# Patient Record
Sex: FEMALE | Race: WHITE | Employment: FULL TIME | URBAN - NONMETROPOLITAN AREA
[De-identification: names, ages, dates, MRNs, and addresses within clinical notes are randomized per-mention and may not be internally consistent; named-entity substitution may affect disease eponyms.]

---

## 2017-03-27 ENCOUNTER — OFFICE VISIT (OUTPATIENT)
Dept: CARDIOLOGY | Age: 52
End: 2017-03-27

## 2017-03-27 VITALS
HEIGHT: 65 IN | WEIGHT: 186.4 LBS | HEART RATE: 80 BPM | SYSTOLIC BLOOD PRESSURE: 164 MMHG | DIASTOLIC BLOOD PRESSURE: 90 MMHG | BODY MASS INDEX: 31.06 KG/M2

## 2017-03-27 DIAGNOSIS — R06.02 SOB (SHORTNESS OF BREATH) ON EXERTION: ICD-10-CM

## 2017-03-27 DIAGNOSIS — Z82.49 FAMILY HISTORY OF PREMATURE CAD: ICD-10-CM

## 2017-03-27 DIAGNOSIS — R07.9 CHEST PAIN AT REST: Primary | ICD-10-CM

## 2017-03-27 DIAGNOSIS — R94.39 ABNORMAL STRESS ECG: ICD-10-CM

## 2017-03-27 DIAGNOSIS — R00.2 INTERMITTENT PALPITATIONS: ICD-10-CM

## 2017-03-27 PROCEDURE — 99204 OFFICE O/P NEW MOD 45 MIN: CPT | Performed by: INTERNAL MEDICINE

## 2017-03-27 RX ORDER — RIBOFLAVIN (VITAMIN B2) 100 MG
500 TABLET ORAL DAILY
COMMUNITY
End: 2022-06-13

## 2017-03-27 RX ORDER — ASPIRIN 81 MG/1
81 TABLET ORAL DAILY
Qty: 30 TABLET | Refills: 3 | COMMUNITY
Start: 2017-03-27

## 2017-04-24 ENCOUNTER — OFFICE VISIT (OUTPATIENT)
Dept: CARDIOLOGY | Age: 52
End: 2017-04-24

## 2017-04-24 VITALS
DIASTOLIC BLOOD PRESSURE: 86 MMHG | HEIGHT: 65 IN | WEIGHT: 185.4 LBS | BODY MASS INDEX: 30.89 KG/M2 | SYSTOLIC BLOOD PRESSURE: 134 MMHG | HEART RATE: 76 BPM

## 2017-04-24 DIAGNOSIS — R94.39 ABNORMAL STRESS ECG: ICD-10-CM

## 2017-04-24 DIAGNOSIS — R06.02 SOB (SHORTNESS OF BREATH) ON EXERTION: ICD-10-CM

## 2017-04-24 DIAGNOSIS — R00.2 INTERMITTENT PALPITATIONS: Primary | ICD-10-CM

## 2017-04-24 PROCEDURE — 99213 OFFICE O/P EST LOW 20 MIN: CPT | Performed by: INTERNAL MEDICINE

## 2017-08-28 ENCOUNTER — OFFICE VISIT (OUTPATIENT)
Dept: CARDIOLOGY CLINIC | Age: 52
End: 2017-08-28
Payer: COMMERCIAL

## 2017-08-28 VITALS
HEART RATE: 60 BPM | HEIGHT: 65 IN | SYSTOLIC BLOOD PRESSURE: 124 MMHG | WEIGHT: 191 LBS | BODY MASS INDEX: 31.82 KG/M2 | DIASTOLIC BLOOD PRESSURE: 62 MMHG

## 2017-08-28 DIAGNOSIS — R00.2 INTERMITTENT PALPITATIONS: Primary | ICD-10-CM

## 2017-08-28 DIAGNOSIS — Z82.49 FAMILY HISTORY OF PREMATURE CAD: ICD-10-CM

## 2017-08-28 DIAGNOSIS — R94.39 ABNORMAL STRESS ECG: ICD-10-CM

## 2017-08-28 DIAGNOSIS — R06.02 SOB (SHORTNESS OF BREATH) ON EXERTION: ICD-10-CM

## 2017-08-28 PROBLEM — R07.9 CHEST PAIN AT REST: Status: RESOLVED | Noted: 2017-03-27 | Resolved: 2017-08-28

## 2017-08-28 PROCEDURE — 99213 OFFICE O/P EST LOW 20 MIN: CPT | Performed by: INTERNAL MEDICINE

## 2017-11-10 ENCOUNTER — HOSPITAL ENCOUNTER (OUTPATIENT)
Dept: WOMENS IMAGING | Age: 52
Discharge: HOME | End: 2017-11-10
Payer: COMMERCIAL

## 2017-11-10 DIAGNOSIS — Z13.9 VISIT FOR SCREENING: ICD-10-CM

## 2017-11-10 PROCEDURE — 77063 BREAST TOMOSYNTHESIS BI: CPT

## 2018-02-03 ENCOUNTER — HOSPITAL ENCOUNTER (OUTPATIENT)
Age: 53
Discharge: HOME | End: 2018-02-03
Payer: COMMERCIAL

## 2018-02-03 LAB
ALBUMIN SERPL-MCNC: 4.6 G/DL (ref 3.5–5.1)
ALP BLD-CCNC: 144 U/L (ref 38–126)
ALT SERPL-CCNC: 12 U/L (ref 11–66)
AMORPHOUS: ABNORMAL
ANION GAP SERPL CALCULATED.3IONS-SCNC: 10 MEQ/L (ref 8–16)
AST SERPL-CCNC: 15 U/L (ref 5–40)
BACTERIA: ABNORMAL
BASOPHILS # BLD: 0.7 %
BASOPHILS ABSOLUTE: 0 THOU/MM3 (ref 0–0.1)
BILIRUB SERPL-MCNC: 0.4 MG/DL (ref 0.3–1.2)
BILIRUBIN URINE: NEGATIVE
BLOOD, URINE: ABNORMAL
BUN BLDV-MCNC: 15 MG/DL (ref 7–22)
CALCIUM SERPL-MCNC: 10 MG/DL (ref 8.5–10.5)
CASTS: ABNORMAL /LPF
CASTS: ABNORMAL /LPF
CHARACTER, URINE: ABNORMAL
CHLORIDE BLD-SCNC: 101 MEQ/L (ref 98–111)
CHOLESTEROL, TOTAL: 187 MG/DL (ref 100–199)
CO2: 32 MEQ/L (ref 23–33)
COLOR: YELLOW
CREAT SERPL-MCNC: 0.6 MG/DL (ref 0.4–1.2)
CREATININE URINE: 249.3 MG/DL
CRYSTALS: ABNORMAL
EOSINOPHIL # BLD: 2.1 %
EOSINOPHILS ABSOLUTE: 0.1 THOU/MM3 (ref 0–0.4)
EPITHELIAL CELLS, UA: ABNORMAL /HPF
GFR SERPL CREATININE-BSD FRML MDRD: > 90 ML/MIN/1.73M2
GLUCOSE BLD-MCNC: 96 MG/DL (ref 70–108)
GLUCOSE, URINE: NEGATIVE MG/DL
HCT VFR BLD CALC: 42.5 % (ref 37–47)
HDLC SERPL-MCNC: 95 MG/DL
HEMOGLOBIN: 14.4 GM/DL (ref 12–16)
KETONES, URINE: NEGATIVE
LDL CHOLESTEROL CALCULATED: 80 MG/DL
LEUKOCYTE EST, POC: NEGATIVE
LYMPHOCYTES # BLD: 30.2 %
LYMPHOCYTES ABSOLUTE: 1.7 THOU/MM3 (ref 1–4.8)
MAGNESIUM: 1.9 MG/DL (ref 1.6–2.4)
MCH RBC QN AUTO: 30.2 PG (ref 27–31)
MCHC RBC AUTO-ENTMCNC: 33.9 GM/DL (ref 33–37)
MCV RBC AUTO: 89 FL (ref 81–99)
MISCELLANEOUS LAB TEST RESULT: ABNORMAL
MONOCYTES # BLD: 11.2 %
MONOCYTES ABSOLUTE: 0.6 THOU/MM3 (ref 0.4–1.3)
MUCUS: ABNORMAL
NITRITE, URINE: NEGATIVE
NUCLEATED RED BLOOD CELLS: 0 /100 WBC
PDW BLD-RTO: 13.1 % (ref 11.5–14.5)
PH UA: 5
PLATELET # BLD: 194 THOU/MM3 (ref 130–400)
PMV BLD AUTO: 9.9 FL (ref 7.4–10.4)
POTASSIUM SERPL-SCNC: 4.1 MEQ/L (ref 3.5–5.2)
PROT/CREAT RATIO, UR: 0.09
PROTEIN UA: NEGATIVE MG/DL
PROTEIN, URINE: 22.3 MG/DL
RBC # BLD: 4.78 MILL/MM3 (ref 4.2–5.4)
RBC URINE: ABNORMAL /HPF
RENAL EPITHELIAL, UA: ABNORMAL
SEG NEUTROPHILS: 55.8 %
SEGMENTED NEUTROPHILS ABSOLUTE COUNT: 3.2 THOU/MM3 (ref 1.8–7.7)
SODIUM BLD-SCNC: 143 MEQ/L (ref 135–145)
SPECIFIC GRAVITY UA: 1.02 (ref 1–1.03)
TOTAL PROTEIN: 7.5 G/DL (ref 6.1–8)
TRIGL SERPL-MCNC: 60 MG/DL (ref 0–199)
UROBILINOGEN, URINE: 0.2 EU/DL
VITAMIN D 25-HYDROXY: 39 NG/ML (ref 30–100)
WBC # BLD: 5.7 THOU/MM3 (ref 4.8–10.8)
WBC UA: ABNORMAL /HPF
YEAST: ABNORMAL

## 2018-02-03 PROCEDURE — 36415 COLL VENOUS BLD VENIPUNCTURE: CPT

## 2018-02-03 PROCEDURE — 85025 COMPLETE CBC W/AUTO DIFF WBC: CPT

## 2018-02-03 PROCEDURE — 80061 LIPID PANEL: CPT

## 2018-02-03 PROCEDURE — 84156 ASSAY OF PROTEIN URINE: CPT

## 2018-02-03 PROCEDURE — 82570 ASSAY OF URINE CREATININE: CPT

## 2018-02-03 PROCEDURE — 83735 ASSAY OF MAGNESIUM: CPT

## 2018-02-03 PROCEDURE — 81001 URINALYSIS AUTO W/SCOPE: CPT

## 2018-02-03 PROCEDURE — 80053 COMPREHEN METABOLIC PANEL: CPT

## 2018-02-03 PROCEDURE — 82306 VITAMIN D 25 HYDROXY: CPT

## 2018-03-05 ENCOUNTER — OFFICE VISIT (OUTPATIENT)
Dept: CARDIOLOGY CLINIC | Age: 53
End: 2018-03-05
Payer: COMMERCIAL

## 2018-03-05 VITALS
HEART RATE: 58 BPM | DIASTOLIC BLOOD PRESSURE: 78 MMHG | SYSTOLIC BLOOD PRESSURE: 126 MMHG | BODY MASS INDEX: 34.37 KG/M2 | WEIGHT: 206.31 LBS | HEIGHT: 65 IN

## 2018-03-05 DIAGNOSIS — R06.02 SOB (SHORTNESS OF BREATH) ON EXERTION: ICD-10-CM

## 2018-03-05 DIAGNOSIS — R00.2 INTERMITTENT PALPITATIONS: Primary | ICD-10-CM

## 2018-03-05 DIAGNOSIS — R94.39 ABNORMAL STRESS ECG: ICD-10-CM

## 2018-03-05 PROCEDURE — 99214 OFFICE O/P EST MOD 30 MIN: CPT | Performed by: INTERNAL MEDICINE

## 2018-03-05 PROCEDURE — 93000 ELECTROCARDIOGRAM COMPLETE: CPT | Performed by: INTERNAL MEDICINE

## 2018-03-05 RX ORDER — FEXOFENADINE HCL 180 MG/1
180 TABLET ORAL DAILY
COMMUNITY
End: 2018-08-17 | Stop reason: SDUPTHER

## 2018-03-05 RX ORDER — PREDNISONE 10 MG/1
10 TABLET ORAL DAILY
COMMUNITY
End: 2018-08-17

## 2018-03-05 NOTE — PROGRESS NOTES
part of the lexiscan stress test is equivocal/Borderline for   ischemia with 0.6 mm ST depression in the inferior and lateral leads(   II,III, AvF, V5 and V6).  Calculated gated LVEF 68 %.   The T.I.D. ratio was 1.2   There is moderate attenuation artifact noted in the inferior wall seems to   be related to bowel artifact.   There was no evidence of definite reversible tracer uptake.   The nuclear images is not suggestive for myocardial ischemia.      Recommendation   Clinical correlation is recommended in view of borderline EKG change.      Signatures      ----------------------------------------------------------------   Electronically signed by Stacey Driver MD (Interpreting   Cardiologist) on 03/08/2017 at 20:20    Echo EF WNL      CONCLUSION: This is a normal sinus rhythm, average heart rate 77 beats per  minute, ranging from  beats per minute. No significant pause of more  than 1.2 seconds noted. Rare ventricular ectopic beats, total of 9, all  isolated. Rare supraventricular ectopic beats, a total of 80, isolated and  supraventricular couplet.     Occasional supraventricular ectopic run, in the triplet and quadruplet pattern  and there are a few triplet and few, 1 quadruplet, the fastest and the longest  composed of 4 beats, rate 132 beats per minute.     There is a correlation most of the time with a palpitation. The patient  stating and is the supraventricular ectopic run particularly with triplets, on  2 times, the patient was complaining of palpitation and the patient was  triplets of supraventricular ectopic beats.     So need further recommendation basically consider beta blocker for suppression  of supraventricular ectopic beats.         Kirit Lucio M.D.        D: 04/04/2017    EKG 3/5/18  NSR, no acute abn     Assessment    1. Intermittent palpitations  EKG 12 Lead   2. SOB (shortness of breath) on exertion  EKG 12 Lead   3.  Boderline Abnormal stress ECG-st depression of 0.6         Plan

## 2018-08-17 ENCOUNTER — OFFICE VISIT (OUTPATIENT)
Dept: FAMILY MEDICINE CLINIC | Age: 53
End: 2018-08-17
Payer: COMMERCIAL

## 2018-08-17 VITALS
DIASTOLIC BLOOD PRESSURE: 74 MMHG | WEIGHT: 201.2 LBS | SYSTOLIC BLOOD PRESSURE: 118 MMHG | OXYGEN SATURATION: 98 % | RESPIRATION RATE: 12 BRPM | BODY MASS INDEX: 33.52 KG/M2 | HEART RATE: 63 BPM | HEIGHT: 65 IN

## 2018-08-17 DIAGNOSIS — E78.1 PURE HYPERGLYCERIDEMIA: ICD-10-CM

## 2018-08-17 DIAGNOSIS — Z78.0 OSTEOPENIA AFTER MENOPAUSE: ICD-10-CM

## 2018-08-17 DIAGNOSIS — I10 ESSENTIAL HYPERTENSION: Primary | ICD-10-CM

## 2018-08-17 DIAGNOSIS — J30.2 SEASONAL ALLERGIES: ICD-10-CM

## 2018-08-17 DIAGNOSIS — M85.80 OSTEOPENIA AFTER MENOPAUSE: ICD-10-CM

## 2018-08-17 DIAGNOSIS — E55.9 VITAMIN D DEFICIENCY: ICD-10-CM

## 2018-08-17 DIAGNOSIS — E66.09 CLASS 1 OBESITY DUE TO EXCESS CALORIES WITH SERIOUS COMORBIDITY AND BODY MASS INDEX (BMI) OF 33.0 TO 33.9 IN ADULT: ICD-10-CM

## 2018-08-17 PROBLEM — R06.02 SOB (SHORTNESS OF BREATH) ON EXERTION: Status: RESOLVED | Noted: 2017-03-27 | Resolved: 2018-08-17

## 2018-08-17 PROBLEM — E66.811 CLASS 1 OBESITY DUE TO EXCESS CALORIES WITH SERIOUS COMORBIDITY AND BODY MASS INDEX (BMI) OF 33.0 TO 33.9 IN ADULT: Status: ACTIVE | Noted: 2018-08-17

## 2018-08-17 PROCEDURE — 99214 OFFICE O/P EST MOD 30 MIN: CPT | Performed by: NURSE PRACTITIONER

## 2018-08-17 RX ORDER — BUSPIRONE HYDROCHLORIDE 10 MG/1
TABLET ORAL 2 TIMES DAILY
COMMUNITY
Start: 2018-08-15 | End: 2019-02-19 | Stop reason: SDUPTHER

## 2018-08-17 RX ORDER — FEXOFENADINE HCL 180 MG/1
180 TABLET ORAL DAILY
Qty: 90 TABLET | Refills: 3 | Status: SHIPPED | OUTPATIENT
Start: 2018-08-17 | End: 2019-02-19 | Stop reason: SDUPTHER

## 2018-08-17 ASSESSMENT — ENCOUNTER SYMPTOMS
SORE THROAT: 0
EYE DISCHARGE: 0
PHOTOPHOBIA: 0
RHINORRHEA: 0
SHORTNESS OF BREATH: 0
TROUBLE SWALLOWING: 0
COUGH: 1
EYE ITCHING: 0
DIARRHEA: 0
ABDOMINAL DISTENTION: 0
BLOOD IN STOOL: 0
COLOR CHANGE: 0
NAUSEA: 0
EYE PAIN: 0
EYE REDNESS: 0
WHEEZING: 0
ABDOMINAL PAIN: 0
CONSTIPATION: 0
SINUS PAIN: 0
BACK PAIN: 0
SINUS PRESSURE: 0
CHEST TIGHTNESS: 0
VOMITING: 0

## 2018-08-17 ASSESSMENT — PATIENT HEALTH QUESTIONNAIRE - PHQ9
1. LITTLE INTEREST OR PLEASURE IN DOING THINGS: 0
SUM OF ALL RESPONSES TO PHQ QUESTIONS 1-9: 0
SUM OF ALL RESPONSES TO PHQ9 QUESTIONS 1 & 2: 0
SUM OF ALL RESPONSES TO PHQ QUESTIONS 1-9: 0
2. FEELING DOWN, DEPRESSED OR HOPELESS: 0

## 2018-08-17 NOTE — PROGRESS NOTES
[facility]  [address] [place] [address]  Dept: [phone]  Dept Fax: [phone]: [phone]     Visit Date:  8/17/2018    Patient:  Daniel Adame  YOB: 1965    HPI:     Chief Complaint   Patient presents with    Annual Exam       Feeling well. Concerned she has a cracked rib d/t pain under the left breast.  Has used advil if pain is bad. Helps. Hurts to cough and to sit up from lying or to bend over. Medications    Current Outpatient Prescriptions:     busPIRone (BUSPAR) 10 MG tablet, 2 times daily , Disp: , Rfl:     fexofenadine (ALLEGRA) 180 MG tablet, Take 1 tablet by mouth daily, Disp: 90 tablet, Rfl: 3    metoprolol tartrate (LOPRESSOR) 25 MG tablet, TAKE ONE TABLET BY MOUTH TWICE DAILY, Disp: 180 tablet, Rfl: 3    Ascorbic Acid (VITAMIN C) 100 MG tablet, Take 1,000 mg by mouth daily, Disp: , Rfl:     aspirin EC 81 MG EC tablet, Take 1 tablet by mouth daily, Disp: 30 tablet, Rfl: 3    ferrous sulfate 325 (65 FE) MG tablet, Take 325 mg by mouth daily (with breakfast), Disp: , Rfl:     vitamin D 1000 UNITS CAPS, Take 1 capsule by mouth daily, Disp: , Rfl:     Omega-3 Fatty Acids (FISH OIL) 1000 MG CAPS, Take 1,000 mg by mouth 2 times daily, Disp: , Rfl:     losartan (COZAAR) 50 MG tablet, Take 50 mg by mouth daily. Indications: High Blood Pressure, Disp: , Rfl:     calcium-vitamin D (OSCAL-500) 500-200 MG-UNIT per tablet, Take 1 tablet by mouth 2 times daily. supplement, Disp: , Rfl:     The patient is allergic to morphine and codeine. Past Medical History  Shruthi Rojas  has a past medical history of Anxiety; Arthritis; Hx of blood clots; Hypertension; and Pure hyperglyceridemia. Past Surgical History  The patient  has a past surgical history that includes Hysterectomy, total abdominal (3/31/2016).     Family History  This patient's family history includes Diabetes in her maternal grandmother; Heart

## 2018-12-06 ENCOUNTER — HOSPITAL ENCOUNTER (OUTPATIENT)
Dept: WOMENS IMAGING | Age: 53
Discharge: HOME | End: 2018-12-06
Payer: COMMERCIAL

## 2018-12-06 DIAGNOSIS — Z12.31 VISIT FOR SCREENING MAMMOGRAM: ICD-10-CM

## 2018-12-06 PROCEDURE — 77067 SCR MAMMO BI INCL CAD: CPT

## 2019-01-21 ENCOUNTER — TELEPHONE (OUTPATIENT)
Dept: ADMINISTRATIVE | Age: 54
End: 2019-01-21

## 2019-02-06 ENCOUNTER — TELEPHONE (OUTPATIENT)
Dept: FAMILY MEDICINE CLINIC | Age: 54
End: 2019-02-06

## 2019-02-06 DIAGNOSIS — I10 ESSENTIAL HYPERTENSION: Primary | ICD-10-CM

## 2019-02-06 RX ORDER — IRBESARTAN 75 MG/1
75 TABLET ORAL DAILY
Qty: 30 TABLET | Refills: 5 | Status: SHIPPED | OUTPATIENT
Start: 2019-02-06 | End: 2019-02-07 | Stop reason: CLARIF

## 2019-02-07 DIAGNOSIS — I10 ESSENTIAL HYPERTENSION: Primary | ICD-10-CM

## 2019-02-07 RX ORDER — OLMESARTAN MEDOXOMIL 20 MG/1
20 TABLET ORAL DAILY
Qty: 90 TABLET | Refills: 3 | Status: SHIPPED | OUTPATIENT
Start: 2019-02-07 | End: 2019-05-20 | Stop reason: CLARIF

## 2019-02-09 ENCOUNTER — HOSPITAL ENCOUNTER (OUTPATIENT)
Age: 54
Discharge: HOME | End: 2019-02-09
Payer: COMMERCIAL

## 2019-02-09 DIAGNOSIS — I10 ESSENTIAL HYPERTENSION: ICD-10-CM

## 2019-02-09 DIAGNOSIS — E66.09 CLASS 1 OBESITY DUE TO EXCESS CALORIES WITH SERIOUS COMORBIDITY AND BODY MASS INDEX (BMI) OF 33.0 TO 33.9 IN ADULT: ICD-10-CM

## 2019-02-09 DIAGNOSIS — E78.1 PURE HYPERGLYCERIDEMIA: ICD-10-CM

## 2019-02-09 DIAGNOSIS — M85.80 OSTEOPENIA AFTER MENOPAUSE: ICD-10-CM

## 2019-02-09 DIAGNOSIS — Z78.0 OSTEOPENIA AFTER MENOPAUSE: ICD-10-CM

## 2019-02-09 DIAGNOSIS — E55.9 VITAMIN D DEFICIENCY: ICD-10-CM

## 2019-02-09 LAB
ALBUMIN SERPL-MCNC: 4.2 G/DL (ref 3.5–5.1)
ALP BLD-CCNC: 138 U/L (ref 38–126)
ALT SERPL-CCNC: 13 U/L (ref 11–66)
ANION GAP SERPL CALCULATED.3IONS-SCNC: 13 MEQ/L (ref 8–16)
AST SERPL-CCNC: 15 U/L (ref 5–40)
AVERAGE GLUCOSE: 99 MG/DL (ref 70–126)
BILIRUB SERPL-MCNC: 0.4 MG/DL (ref 0.3–1.2)
BUN BLDV-MCNC: 18 MG/DL (ref 7–22)
CALCIUM SERPL-MCNC: 10 MG/DL (ref 8.5–10.5)
CHLORIDE BLD-SCNC: 103 MEQ/L (ref 98–111)
CHOLESTEROL, TOTAL: 167 MG/DL (ref 100–199)
CO2: 27 MEQ/L (ref 23–33)
CREAT SERPL-MCNC: 0.6 MG/DL (ref 0.4–1.2)
ERYTHROCYTE [DISTWIDTH] IN BLOOD BY AUTOMATED COUNT: 12.9 % (ref 11.5–14.5)
ERYTHROCYTE [DISTWIDTH] IN BLOOD BY AUTOMATED COUNT: 42.4 FL (ref 35–45)
GFR SERPL CREATININE-BSD FRML MDRD: > 90 ML/MIN/1.73M2
GLUCOSE BLD-MCNC: 93 MG/DL (ref 70–108)
HBA1C MFR BLD: 5.3 % (ref 4.4–6.4)
HCT VFR BLD CALC: 42.3 % (ref 37–47)
HDLC SERPL-MCNC: 75 MG/DL
HEMOGLOBIN: 13.8 GM/DL (ref 12–16)
LDL CHOLESTEROL CALCULATED: 85 MG/DL
MAGNESIUM: 1.9 MG/DL (ref 1.6–2.4)
MCH RBC QN AUTO: 29.7 PG (ref 26–33)
MCHC RBC AUTO-ENTMCNC: 32.6 GM/DL (ref 32.2–35.5)
MCV RBC AUTO: 91 FL (ref 81–99)
PLATELET # BLD: 222 THOU/MM3 (ref 130–400)
PMV BLD AUTO: 11.2 FL (ref 9.4–12.4)
POTASSIUM SERPL-SCNC: 4.4 MEQ/L (ref 3.5–5.2)
RBC # BLD: 4.65 MILL/MM3 (ref 4.2–5.4)
SODIUM BLD-SCNC: 143 MEQ/L (ref 135–145)
TOTAL PROTEIN: 7.2 G/DL (ref 6.1–8)
TRIGL SERPL-MCNC: 37 MG/DL (ref 0–199)
VITAMIN D 25-HYDROXY: 33 NG/ML (ref 30–100)
WBC # BLD: 5.3 THOU/MM3 (ref 4.8–10.8)

## 2019-02-09 PROCEDURE — 82306 VITAMIN D 25 HYDROXY: CPT

## 2019-02-09 PROCEDURE — 80061 LIPID PANEL: CPT

## 2019-02-09 PROCEDURE — 83735 ASSAY OF MAGNESIUM: CPT

## 2019-02-09 PROCEDURE — 36415 COLL VENOUS BLD VENIPUNCTURE: CPT

## 2019-02-09 PROCEDURE — 80053 COMPREHEN METABOLIC PANEL: CPT

## 2019-02-09 PROCEDURE — 85027 COMPLETE CBC AUTOMATED: CPT

## 2019-02-09 PROCEDURE — 83036 HEMOGLOBIN GLYCOSYLATED A1C: CPT

## 2019-02-19 ENCOUNTER — OFFICE VISIT (OUTPATIENT)
Dept: FAMILY MEDICINE CLINIC | Age: 54
End: 2019-02-19
Payer: COMMERCIAL

## 2019-02-19 VITALS
HEIGHT: 65 IN | DIASTOLIC BLOOD PRESSURE: 88 MMHG | RESPIRATION RATE: 12 BRPM | BODY MASS INDEX: 34.19 KG/M2 | OXYGEN SATURATION: 98 % | SYSTOLIC BLOOD PRESSURE: 122 MMHG | WEIGHT: 205.2 LBS | HEART RATE: 61 BPM

## 2019-02-19 DIAGNOSIS — F41.9 ANXIETY: ICD-10-CM

## 2019-02-19 DIAGNOSIS — J30.2 SEASONAL ALLERGIES: ICD-10-CM

## 2019-02-19 DIAGNOSIS — E55.9 VITAMIN D DEFICIENCY: ICD-10-CM

## 2019-02-19 DIAGNOSIS — I10 ESSENTIAL HYPERTENSION: Primary | ICD-10-CM

## 2019-02-19 DIAGNOSIS — E78.1 PURE HYPERGLYCERIDEMIA: ICD-10-CM

## 2019-02-19 PROCEDURE — 99213 OFFICE O/P EST LOW 20 MIN: CPT | Performed by: NURSE PRACTITIONER

## 2019-02-19 RX ORDER — BUSPIRONE HYDROCHLORIDE 10 MG/1
10 TABLET ORAL 2 TIMES DAILY
Qty: 180 TABLET | Refills: 3 | Status: SHIPPED | OUTPATIENT
Start: 2019-02-19 | End: 2020-02-14

## 2019-02-19 RX ORDER — FEXOFENADINE HCL 180 MG/1
180 TABLET ORAL DAILY
Qty: 90 TABLET | Refills: 3 | Status: ON HOLD | OUTPATIENT
Start: 2019-02-19 | End: 2019-07-24 | Stop reason: SDUPTHER

## 2019-02-19 ASSESSMENT — ENCOUNTER SYMPTOMS
ABDOMINAL DISTENTION: 0
CONSTIPATION: 0
BACK PAIN: 0
RHINORRHEA: 1
SINUS PAIN: 0
EYE PAIN: 0
EYE DISCHARGE: 0
VOMITING: 0
SHORTNESS OF BREATH: 0
NAUSEA: 0
DIARRHEA: 0
TROUBLE SWALLOWING: 0
EYE REDNESS: 0
EYE ITCHING: 0
SORE THROAT: 0
WHEEZING: 0
COUGH: 0
ABDOMINAL PAIN: 0
PHOTOPHOBIA: 0
CHEST TIGHTNESS: 0
BLOOD IN STOOL: 0
COLOR CHANGE: 0
SINUS PRESSURE: 0

## 2019-02-19 ASSESSMENT — PATIENT HEALTH QUESTIONNAIRE - PHQ9
1. LITTLE INTEREST OR PLEASURE IN DOING THINGS: 0
2. FEELING DOWN, DEPRESSED OR HOPELESS: 0
SUM OF ALL RESPONSES TO PHQ9 QUESTIONS 1 & 2: 0
SUM OF ALL RESPONSES TO PHQ QUESTIONS 1-9: 0
SUM OF ALL RESPONSES TO PHQ QUESTIONS 1-9: 0

## 2019-03-11 ENCOUNTER — OFFICE VISIT (OUTPATIENT)
Dept: CARDIOLOGY CLINIC | Age: 54
End: 2019-03-11
Payer: COMMERCIAL

## 2019-03-11 VITALS
SYSTOLIC BLOOD PRESSURE: 106 MMHG | HEIGHT: 65 IN | BODY MASS INDEX: 34.41 KG/M2 | WEIGHT: 206.5 LBS | DIASTOLIC BLOOD PRESSURE: 65 MMHG | HEART RATE: 63 BPM

## 2019-03-11 DIAGNOSIS — I10 ESSENTIAL HYPERTENSION: ICD-10-CM

## 2019-03-11 DIAGNOSIS — R00.2 INTERMITTENT PALPITATIONS: Primary | ICD-10-CM

## 2019-03-11 DIAGNOSIS — R94.39 ABNORMAL STRESS ECG: ICD-10-CM

## 2019-03-11 PROCEDURE — 99214 OFFICE O/P EST MOD 30 MIN: CPT | Performed by: INTERNAL MEDICINE

## 2019-03-11 PROCEDURE — 93000 ELECTROCARDIOGRAM COMPLETE: CPT | Performed by: INTERNAL MEDICINE

## 2019-05-20 ENCOUNTER — TELEPHONE (OUTPATIENT)
Dept: FAMILY MEDICINE CLINIC | Age: 54
End: 2019-05-20

## 2019-05-20 DIAGNOSIS — I10 ESSENTIAL HYPERTENSION: Primary | ICD-10-CM

## 2019-05-20 RX ORDER — LOSARTAN POTASSIUM 50 MG/1
50 TABLET ORAL DAILY
Qty: 30 TABLET | Refills: 3 | Status: SHIPPED | OUTPATIENT
Start: 2019-05-20 | End: 2019-09-18 | Stop reason: SDUPTHER

## 2019-05-20 NOTE — TELEPHONE ENCOUNTER
[facility] is out of olmesartan and it is on backorder, they would like something else sent in to replace this

## 2019-05-20 NOTE — TELEPHONE ENCOUNTER
Patient states she was on this but it was changed because it was recalled. What would you like her to do?

## 2019-07-09 ENCOUNTER — OFFICE VISIT (OUTPATIENT)
Dept: FAMILY MEDICINE CLINIC | Age: 54
End: 2019-07-09
Payer: COMMERCIAL

## 2019-07-09 VITALS
HEART RATE: 64 BPM | RESPIRATION RATE: 14 BRPM | OXYGEN SATURATION: 97 % | DIASTOLIC BLOOD PRESSURE: 76 MMHG | SYSTOLIC BLOOD PRESSURE: 122 MMHG | WEIGHT: 205.2 LBS | BODY MASS INDEX: 34.15 KG/M2

## 2019-07-09 DIAGNOSIS — N30.01 ACUTE CYSTITIS WITH HEMATURIA: Primary | ICD-10-CM

## 2019-07-09 DIAGNOSIS — R30.0 DYSURIA: ICD-10-CM

## 2019-07-09 LAB
BILIRUBIN, POC: NEGATIVE
BLOOD URINE, POC: NORMAL
CLARITY, POC: CLEAR
COLOR, POC: YELLOW
GLUCOSE URINE, POC: NEGATIVE
KETONES, POC: NEGATIVE
LEUKOCYTE EST, POC: NORMAL
NITRITE, POC: NEGATIVE
PH, POC: 5
PROTEIN, POC: NORMAL
SPECIFIC GRAVITY, POC: >=1.03
UROBILINOGEN, POC: NORMAL

## 2019-07-09 PROCEDURE — 81003 URINALYSIS AUTO W/O SCOPE: CPT | Performed by: NURSE PRACTITIONER

## 2019-07-09 PROCEDURE — 99213 OFFICE O/P EST LOW 20 MIN: CPT | Performed by: NURSE PRACTITIONER

## 2019-07-09 RX ORDER — SULFAMETHOXAZOLE AND TRIMETHOPRIM 400; 80 MG/1; MG/1
1 TABLET ORAL 2 TIMES DAILY
Qty: 20 TABLET | Refills: 0 | Status: SHIPPED | OUTPATIENT
Start: 2019-07-09 | End: 2019-07-19

## 2019-07-09 ASSESSMENT — ENCOUNTER SYMPTOMS
TROUBLE SWALLOWING: 0
VOMITING: 0
SHORTNESS OF BREATH: 0
WHEEZING: 0
CONSTIPATION: 0
SINUS PAIN: 0
EYE DISCHARGE: 0
EYE PAIN: 0
PHOTOPHOBIA: 0
SORE THROAT: 0
EYE ITCHING: 0
ABDOMINAL DISTENTION: 0
ABDOMINAL PAIN: 0
COLOR CHANGE: 0
COUGH: 0
BLOOD IN STOOL: 0
DIARRHEA: 0
NAUSEA: 0
CHEST TIGHTNESS: 0
EYE REDNESS: 0
BACK PAIN: 0
RHINORRHEA: 0
SINUS PRESSURE: 0

## 2019-07-11 LAB
ORGANISM: ABNORMAL
URINE CULTURE, ROUTINE: ABNORMAL

## 2019-07-22 ENCOUNTER — APPOINTMENT (OUTPATIENT)
Dept: CT IMAGING | Age: 54
End: 2019-07-22
Payer: COMMERCIAL

## 2019-07-22 ENCOUNTER — HOSPITAL ENCOUNTER (OUTPATIENT)
Age: 54
Setting detail: OBSERVATION
Discharge: HOME | End: 2019-07-24
Attending: FAMILY MEDICINE | Admitting: INTERNAL MEDICINE
Payer: COMMERCIAL

## 2019-07-22 ENCOUNTER — APPOINTMENT (OUTPATIENT)
Dept: GENERAL RADIOLOGY | Age: 54
End: 2019-07-22
Payer: COMMERCIAL

## 2019-07-22 DIAGNOSIS — R07.2 RETROSTERNAL CHEST PAIN: Primary | ICD-10-CM

## 2019-07-22 DIAGNOSIS — E04.9 RETROSTERNAL GOITER: ICD-10-CM

## 2019-07-22 DIAGNOSIS — J30.2 SEASONAL ALLERGIES: ICD-10-CM

## 2019-07-22 DIAGNOSIS — E04.2 MULTIPLE THYROID NODULES: ICD-10-CM

## 2019-07-22 LAB
ANION GAP SERPL CALCULATED.3IONS-SCNC: 13 MEQ/L (ref 8–16)
APTT: 28.6 SECONDS (ref 22–38)
BASOPHILS # BLD: 0.3 %
BASOPHILS ABSOLUTE: 0 THOU/MM3 (ref 0–0.1)
BUN BLDV-MCNC: 15 MG/DL (ref 7–22)
C-REACTIVE PROTEIN: 0.13 MG/DL (ref 0–1)
CALCIUM SERPL-MCNC: 9.7 MG/DL (ref 8.5–10.5)
CHLORIDE BLD-SCNC: 100 MEQ/L (ref 98–111)
CO2: 29 MEQ/L (ref 23–33)
CREAT SERPL-MCNC: 0.9 MG/DL (ref 0.4–1.2)
D-DIMER QUANTITATIVE: 996 NG/ML FEU (ref 0–500)
EOSINOPHIL # BLD: 1.5 %
EOSINOPHILS ABSOLUTE: 0.2 THOU/MM3 (ref 0–0.4)
ERYTHROCYTE [DISTWIDTH] IN BLOOD BY AUTOMATED COUNT: 12.7 % (ref 11.5–14.5)
ERYTHROCYTE [DISTWIDTH] IN BLOOD BY AUTOMATED COUNT: 43.2 FL (ref 35–45)
GFR SERPL CREATININE-BSD FRML MDRD: 65 ML/MIN/1.73M2
GLUCOSE BLD-MCNC: 129 MG/DL (ref 70–108)
HCT VFR BLD CALC: 40.3 % (ref 37–47)
HEMOGLOBIN: 13 GM/DL (ref 12–16)
IMMATURE GRANS (ABS): 0.08 THOU/MM3 (ref 0–0.07)
IMMATURE GRANULOCYTES: 1 %
INR BLD: 0.92 (ref 0.85–1.13)
LYMPHOCYTES # BLD: 19.6 %
LYMPHOCYTES ABSOLUTE: 2.6 THOU/MM3 (ref 1–4.8)
MCH RBC QN AUTO: 29.8 PG (ref 26–33)
MCHC RBC AUTO-ENTMCNC: 32.3 GM/DL (ref 32.2–35.5)
MCV RBC AUTO: 92.4 FL (ref 81–99)
MONOCYTES # BLD: 7.7 %
MONOCYTES ABSOLUTE: 1 THOU/MM3 (ref 0.4–1.3)
NUCLEATED RED BLOOD CELLS: 0 /100 WBC
OSMOLALITY CALCULATION: 285.6 MOSMOL/KG (ref 275–300)
PLATELET # BLD: 225 THOU/MM3 (ref 130–400)
PMV BLD AUTO: 10.6 FL (ref 9.4–12.4)
POTASSIUM REFLEX MAGNESIUM: 3.9 MEQ/L (ref 3.5–5.2)
PRO-BNP: 205.8 PG/ML (ref 0–900)
RBC # BLD: 4.36 MILL/MM3 (ref 4.2–5.4)
SEG NEUTROPHILS: 70.3 %
SEGMENTED NEUTROPHILS ABSOLUTE COUNT: 9.2 THOU/MM3 (ref 1.8–7.7)
SODIUM BLD-SCNC: 142 MEQ/L (ref 135–145)
TROPONIN T: < 0.01 NG/ML
WBC # BLD: 13.1 THOU/MM3 (ref 4.8–10.8)

## 2019-07-22 PROCEDURE — 84484 ASSAY OF TROPONIN QUANT: CPT

## 2019-07-22 PROCEDURE — 99285 EMERGENCY DEPT VISIT HI MDM: CPT

## 2019-07-22 PROCEDURE — 85025 COMPLETE CBC W/AUTO DIFF WBC: CPT

## 2019-07-22 PROCEDURE — 2580000003 HC RX 258: Performed by: FAMILY MEDICINE

## 2019-07-22 PROCEDURE — 71045 X-RAY EXAM CHEST 1 VIEW: CPT

## 2019-07-22 PROCEDURE — 96374 THER/PROPH/DIAG INJ IV PUSH: CPT

## 2019-07-22 PROCEDURE — 96375 TX/PRO/DX INJ NEW DRUG ADDON: CPT

## 2019-07-22 PROCEDURE — 80048 BASIC METABOLIC PNL TOTAL CA: CPT

## 2019-07-22 PROCEDURE — 36415 COLL VENOUS BLD VENIPUNCTURE: CPT

## 2019-07-22 PROCEDURE — 83880 ASSAY OF NATRIURETIC PEPTIDE: CPT

## 2019-07-22 PROCEDURE — 6370000000 HC RX 637 (ALT 250 FOR IP): Performed by: FAMILY MEDICINE

## 2019-07-22 PROCEDURE — 84703 CHORIONIC GONADOTROPIN ASSAY: CPT

## 2019-07-22 PROCEDURE — 74176 CT ABD & PELVIS W/O CONTRAST: CPT

## 2019-07-22 PROCEDURE — 86140 C-REACTIVE PROTEIN: CPT

## 2019-07-22 PROCEDURE — 6360000002 HC RX W HCPCS: Performed by: FAMILY MEDICINE

## 2019-07-22 PROCEDURE — 85730 THROMBOPLASTIN TIME PARTIAL: CPT

## 2019-07-22 PROCEDURE — 93005 ELECTROCARDIOGRAM TRACING: CPT | Performed by: FAMILY MEDICINE

## 2019-07-22 PROCEDURE — 85610 PROTHROMBIN TIME: CPT

## 2019-07-22 PROCEDURE — 85379 FIBRIN DEGRADATION QUANT: CPT

## 2019-07-22 RX ORDER — ONDANSETRON 2 MG/ML
4 INJECTION INTRAMUSCULAR; INTRAVENOUS EVERY 30 MIN PRN
Status: DISCONTINUED | OUTPATIENT
Start: 2019-07-22 | End: 2019-07-23

## 2019-07-22 RX ORDER — 0.9 % SODIUM CHLORIDE 0.9 %
1000 INTRAVENOUS SOLUTION INTRAVENOUS ONCE
Status: COMPLETED | OUTPATIENT
Start: 2019-07-22 | End: 2019-07-22

## 2019-07-22 RX ORDER — FENTANYL CITRATE 50 UG/ML
50 INJECTION, SOLUTION INTRAMUSCULAR; INTRAVENOUS ONCE
Status: COMPLETED | OUTPATIENT
Start: 2019-07-22 | End: 2019-07-22

## 2019-07-22 RX ADMIN — FENTANYL CITRATE 50 MCG: 50 INJECTION, SOLUTION INTRAMUSCULAR; INTRAVENOUS at 22:30

## 2019-07-22 RX ADMIN — SODIUM CHLORIDE 1000 ML: 9 INJECTION, SOLUTION INTRAVENOUS at 22:31

## 2019-07-22 RX ADMIN — Medication: at 22:31

## 2019-07-22 RX ADMIN — ONDANSETRON 4 MG: 2 INJECTION INTRAMUSCULAR; INTRAVENOUS at 22:35

## 2019-07-22 ASSESSMENT — ENCOUNTER SYMPTOMS
VOMITING: 0
COUGH: 0
WHEEZING: 0
SORE THROAT: 0
DIARRHEA: 0
SHORTNESS OF BREATH: 0
ABDOMINAL PAIN: 1
RHINORRHEA: 0
EYE DISCHARGE: 0
BACK PAIN: 0
EYE PAIN: 0
NAUSEA: 0

## 2019-07-22 ASSESSMENT — PAIN DESCRIPTION - LOCATION: LOCATION: CHEST

## 2019-07-22 ASSESSMENT — PAIN SCALES - GENERAL
PAINLEVEL_OUTOF10: 5
PAINLEVEL_OUTOF10: 7
PAINLEVEL_OUTOF10: 8

## 2019-07-23 ENCOUNTER — APPOINTMENT (OUTPATIENT)
Dept: NON INVASIVE DIAGNOSTICS | Age: 54
End: 2019-07-23
Payer: COMMERCIAL

## 2019-07-23 ENCOUNTER — APPOINTMENT (OUTPATIENT)
Dept: ULTRASOUND IMAGING | Age: 54
End: 2019-07-23
Payer: COMMERCIAL

## 2019-07-23 ENCOUNTER — APPOINTMENT (OUTPATIENT)
Dept: CT IMAGING | Age: 54
End: 2019-07-23
Payer: COMMERCIAL

## 2019-07-23 PROBLEM — R07.9 CHEST PAIN: Status: ACTIVE | Noted: 2019-07-23

## 2019-07-23 LAB
ANION GAP SERPL CALCULATED.3IONS-SCNC: 10 MEQ/L (ref 8–16)
BUN BLDV-MCNC: 13 MG/DL (ref 7–22)
CALCIUM SERPL-MCNC: 9.5 MG/DL (ref 8.5–10.5)
CHLORIDE BLD-SCNC: 104 MEQ/L (ref 98–111)
CO2: 26 MEQ/L (ref 23–33)
CREAT SERPL-MCNC: 0.6 MG/DL (ref 0.4–1.2)
EKG ATRIAL RATE: 52 BPM
EKG P AXIS: 48 DEGREES
EKG P-R INTERVAL: 114 MS
EKG Q-T INTERVAL: 436 MS
EKG QRS DURATION: 98 MS
EKG QTC CALCULATION (BAZETT): 405 MS
EKG R AXIS: 50 DEGREES
EKG T AXIS: 33 DEGREES
EKG VENTRICULAR RATE: 52 BPM
GFR SERPL CREATININE-BSD FRML MDRD: > 90 ML/MIN/1.73M2
GLUCOSE BLD-MCNC: 105 MG/DL (ref 70–108)
OSMOLALITY CALCULATION: 279.9 MOSMOL/KG (ref 275–300)
POTASSIUM REFLEX MAGNESIUM: 4.3 MEQ/L (ref 3.5–5.2)
PREGNANCY, SERUM: NEGATIVE
SODIUM BLD-SCNC: 140 MEQ/L (ref 135–145)
TROPONIN T: < 0.01 NG/ML
TROPONIN T: < 0.01 NG/ML

## 2019-07-23 PROCEDURE — 93017 CV STRESS TEST TRACING ONLY: CPT

## 2019-07-23 PROCEDURE — 6370000000 HC RX 637 (ALT 250 FOR IP): Performed by: INTERNAL MEDICINE

## 2019-07-23 PROCEDURE — G0378 HOSPITAL OBSERVATION PER HR: HCPCS

## 2019-07-23 PROCEDURE — 76770 US EXAM ABDO BACK WALL COMP: CPT

## 2019-07-23 PROCEDURE — A9500 TC99M SESTAMIBI: HCPCS | Performed by: INTERNAL MEDICINE

## 2019-07-23 PROCEDURE — 2709999900 HC NON-CHARGEABLE SUPPLY

## 2019-07-23 PROCEDURE — 3430000000 HC RX DIAGNOSTIC RADIOPHARMACEUTICAL: Performed by: INTERNAL MEDICINE

## 2019-07-23 PROCEDURE — 84484 ASSAY OF TROPONIN QUANT: CPT

## 2019-07-23 PROCEDURE — 71275 CT ANGIOGRAPHY CHEST: CPT

## 2019-07-23 PROCEDURE — 6360000002 HC RX W HCPCS: Performed by: INTERNAL MEDICINE

## 2019-07-23 PROCEDURE — 80048 BASIC METABOLIC PNL TOTAL CA: CPT

## 2019-07-23 PROCEDURE — 6360000002 HC RX W HCPCS: Performed by: FAMILY MEDICINE

## 2019-07-23 PROCEDURE — 96376 TX/PRO/DX INJ SAME DRUG ADON: CPT

## 2019-07-23 PROCEDURE — 78452 HT MUSCLE IMAGE SPECT MULT: CPT

## 2019-07-23 PROCEDURE — 6360000002 HC RX W HCPCS

## 2019-07-23 PROCEDURE — 36415 COLL VENOUS BLD VENIPUNCTURE: CPT

## 2019-07-23 PROCEDURE — 93010 ELECTROCARDIOGRAM REPORT: CPT | Performed by: INTERNAL MEDICINE

## 2019-07-23 PROCEDURE — 99220 PR INITIAL OBSERVATION CARE/DAY 70 MINUTES: CPT | Performed by: INTERNAL MEDICINE

## 2019-07-23 PROCEDURE — 96372 THER/PROPH/DIAG INJ SC/IM: CPT

## 2019-07-23 PROCEDURE — 2580000003 HC RX 258: Performed by: INTERNAL MEDICINE

## 2019-07-23 PROCEDURE — 6360000004 HC RX CONTRAST MEDICATION: Performed by: FAMILY MEDICINE

## 2019-07-23 RX ORDER — METOPROLOL TARTRATE 5 MG/5ML
5 INJECTION INTRAVENOUS EVERY 5 MIN PRN
Status: ACTIVE | OUTPATIENT
Start: 2019-07-23 | End: 2019-07-23

## 2019-07-23 RX ORDER — SODIUM CHLORIDE 0.9 % (FLUSH) 0.9 %
10 SYRINGE (ML) INJECTION EVERY 12 HOURS SCHEDULED
Status: DISCONTINUED | OUTPATIENT
Start: 2019-07-23 | End: 2019-07-24 | Stop reason: HOSPADM

## 2019-07-23 RX ORDER — FENTANYL CITRATE 50 UG/ML
50 INJECTION, SOLUTION INTRAMUSCULAR; INTRAVENOUS ONCE
Status: COMPLETED | OUTPATIENT
Start: 2019-07-23 | End: 2019-07-23

## 2019-07-23 RX ORDER — ALBUTEROL SULFATE 90 UG/1
2 AEROSOL, METERED RESPIRATORY (INHALATION) PRN
Status: ACTIVE | OUTPATIENT
Start: 2019-07-23 | End: 2019-07-23

## 2019-07-23 RX ORDER — ASCORBIC ACID 500 MG
500 TABLET ORAL DAILY
Status: DISCONTINUED | OUTPATIENT
Start: 2019-07-23 | End: 2019-07-24 | Stop reason: HOSPADM

## 2019-07-23 RX ORDER — ATROPINE SULFATE 0.1 MG/ML
0.5 INJECTION INTRAVENOUS EVERY 5 MIN PRN
Status: ACTIVE | OUTPATIENT
Start: 2019-07-23 | End: 2019-07-23

## 2019-07-23 RX ORDER — BUSPIRONE HYDROCHLORIDE 10 MG/1
10 TABLET ORAL 2 TIMES DAILY
Status: DISCONTINUED | OUTPATIENT
Start: 2019-07-23 | End: 2019-07-24 | Stop reason: HOSPADM

## 2019-07-23 RX ORDER — NITROGLYCERIN 0.4 MG/1
0.4 TABLET SUBLINGUAL EVERY 5 MIN PRN
Status: ACTIVE | OUTPATIENT
Start: 2019-07-23 | End: 2019-07-23

## 2019-07-23 RX ORDER — OYSTER SHELL CALCIUM WITH VITAMIN D 500; 200 MG/1; [IU]/1
1 TABLET, FILM COATED ORAL DAILY
Status: DISCONTINUED | OUTPATIENT
Start: 2019-07-23 | End: 2019-07-24 | Stop reason: HOSPADM

## 2019-07-23 RX ORDER — FERROUS SULFATE 325(65) MG
325 TABLET ORAL
Status: DISCONTINUED | OUTPATIENT
Start: 2019-07-23 | End: 2019-07-24 | Stop reason: HOSPADM

## 2019-07-23 RX ORDER — SODIUM CHLORIDE 0.9 % (FLUSH) 0.9 %
10 SYRINGE (ML) INJECTION PRN
Status: DISCONTINUED | OUTPATIENT
Start: 2019-07-23 | End: 2019-07-24 | Stop reason: HOSPADM

## 2019-07-23 RX ORDER — AMINOPHYLLINE DIHYDRATE 25 MG/ML
50 INJECTION, SOLUTION INTRAVENOUS PRN
Status: ACTIVE | OUTPATIENT
Start: 2019-07-23 | End: 2019-07-23

## 2019-07-23 RX ORDER — LOSARTAN POTASSIUM 50 MG/1
50 TABLET ORAL DAILY
Status: DISCONTINUED | OUTPATIENT
Start: 2019-07-23 | End: 2019-07-24 | Stop reason: HOSPADM

## 2019-07-23 RX ORDER — FENTANYL CITRATE 50 UG/ML
50 INJECTION, SOLUTION INTRAMUSCULAR; INTRAVENOUS
Status: DISCONTINUED | OUTPATIENT
Start: 2019-07-23 | End: 2019-07-23

## 2019-07-23 RX ORDER — ATORVASTATIN CALCIUM 40 MG/1
40 TABLET, FILM COATED ORAL NIGHTLY
Status: DISCONTINUED | OUTPATIENT
Start: 2019-07-23 | End: 2019-07-24 | Stop reason: HOSPADM

## 2019-07-23 RX ORDER — SODIUM CHLORIDE 9 MG/ML
500 INJECTION, SOLUTION INTRAVENOUS CONTINUOUS PRN
Status: ACTIVE | OUTPATIENT
Start: 2019-07-23 | End: 2019-07-23

## 2019-07-23 RX ORDER — SODIUM CHLORIDE 0.9 % (FLUSH) 0.9 %
10 SYRINGE (ML) INJECTION PRN
Status: ACTIVE | OUTPATIENT
Start: 2019-07-23 | End: 2019-07-23

## 2019-07-23 RX ORDER — CETIRIZINE HYDROCHLORIDE 10 MG/1
10 TABLET ORAL DAILY
Status: DISCONTINUED | OUTPATIENT
Start: 2019-07-23 | End: 2019-07-24 | Stop reason: HOSPADM

## 2019-07-23 RX ORDER — ASPIRIN 81 MG/1
81 TABLET ORAL DAILY
Status: DISCONTINUED | OUTPATIENT
Start: 2019-07-23 | End: 2019-07-24 | Stop reason: HOSPADM

## 2019-07-23 RX ORDER — ONDANSETRON 2 MG/ML
4 INJECTION INTRAMUSCULAR; INTRAVENOUS EVERY 6 HOURS PRN
Status: DISCONTINUED | OUTPATIENT
Start: 2019-07-23 | End: 2019-07-24 | Stop reason: HOSPADM

## 2019-07-23 RX ADMIN — ENOXAPARIN SODIUM 40 MG: 40 INJECTION SUBCUTANEOUS at 15:36

## 2019-07-23 RX ADMIN — METOPROLOL TARTRATE 25 MG: 25 TABLET, FILM COATED ORAL at 13:23

## 2019-07-23 RX ADMIN — METOPROLOL TARTRATE 25 MG: 25 TABLET, FILM COATED ORAL at 20:49

## 2019-07-23 RX ADMIN — Medication 10.2 MILLICURIE: at 09:10

## 2019-07-23 RX ADMIN — Medication 34.7 MILLICURIE: at 10:08

## 2019-07-23 RX ADMIN — BUSPIRONE HYDROCHLORIDE 10 MG: 10 TABLET ORAL at 13:24

## 2019-07-23 RX ADMIN — BUSPIRONE HYDROCHLORIDE 10 MG: 10 TABLET ORAL at 20:49

## 2019-07-23 RX ADMIN — FENTANYL CITRATE 50 MCG: 50 INJECTION, SOLUTION INTRAMUSCULAR; INTRAVENOUS at 01:08

## 2019-07-23 RX ADMIN — LOSARTAN POTASSIUM 50 MG: 50 TABLET ORAL at 20:49

## 2019-07-23 RX ADMIN — Medication 10 ML: at 21:37

## 2019-07-23 RX ADMIN — IOPAMIDOL 80 ML: 755 INJECTION, SOLUTION INTRAVENOUS at 00:50

## 2019-07-23 ASSESSMENT — PAIN SCALES - GENERAL
PAINLEVEL_OUTOF10: 0
PAINLEVEL_OUTOF10: 5
PAINLEVEL_OUTOF10: 5
PAINLEVEL_OUTOF10: 4

## 2019-07-23 NOTE — ED NOTES
Pt taken to restroom and back. Updated pt on plan of care. Pt verbalized understanding.  Call light in reach     Judd Harp RN  07/23/19 3647

## 2019-07-23 NOTE — H&P
color, texture, turgor normal.  No rashes or lesions. Neurologic:  Neurovascularly intact without any focal sensory/motor deficits. Cranial nerves: II-XII intact, grossly non-focal.  Psychiatric:  Alert and oriented, thought content appropriate, normal insight  Capillary Refill: Brisk,< 3 seconds   Peripheral Pulses: +2 palpable, equal bilaterally       Labs:     Recent Labs     07/22/19 2158   WBC 13.1*   HGB 13.0   HCT 40.3        Recent Labs     07/22/19 2158 07/23/19  0437    140   K 3.9 4.3    104   CO2 29 26   BUN 15 13   CREATININE 0.9 0.6   CALCIUM 9.7 9.5     No results for input(s): AST, ALT, BILIDIR, BILITOT, ALKPHOS in the last 72 hours. Recent Labs     07/22/19 2158   INR 0.92     No results for input(s): June Callander in the last 72 hours. Urinalysis:      Lab Results   Component Value Date    NITRU NEGATIVE 02/03/2018    WBCUA 0-2 02/03/2018    BACTERIA FEW 02/03/2018    RBCUA 5-10 02/03/2018    BLOODU large 07/09/2019    BLOODU LARGE 02/03/2018    SPECGRAV >=1.030 07/09/2019    SPECGRAV 1.023 02/03/2018    GLUCOSEU negative 07/09/2019    GLUCOSEU NEGATIVE 01/12/2013       Radiology:   I have reviewed the imaging studies with the following interpretation:  CTA Chest W WO Contrast   Final Result      No acute pulmonary embolism. No acute pneumonia. Bilateral upper and lower lobe and lingular atelectasis. Enlarging thyroid nodules, recommend nonemergent thyroid ultrasound. Enlarging anterior mediastinal probable adenopathy as is is probably separate from the thyroid nodules however cannot entirely exclude retromanubrial extension of a thyroid nodule. **This report has been created using voice recognition software. It may contain minor errors which are inherent in voice recognition technology. **      Final report electronically signed by Dr. Keven Damon on 7/23/2019 1:31 AM      CT ABDOMEN PELVIS WO CONTRAST Additional Contrast? None   Final Result had mi at age 63    Essential hypertension  Resolved Problems:    * No resolved hospital problems. *        · Observation for chest pain  · Cycle troponin  · Stress test  · PRN dilaudid for pain (patient has unk allergy to morphine)  · Nitro paste 1in x1 dose  · Daily ASA 81mg  · Pt on lopressor 25mg BID  · Continue with other home medications, check US of kidney due to lesions noted on CT abd      Thank you YOBANY Huitron - JAYDON for the opportunity to be involved in this patient's care.     Electronically signed by Fahad Puckett DO on 7/23/2019 at 6:43 AM

## 2019-07-23 NOTE — ED NOTES
Pt taken to restroom and then back to bed. Vitals reassessed. Pt got another warm blanket. Call light in reach. Respirations unlabored and even.       Sanket Galloway RN  07/23/19 8917

## 2019-07-23 NOTE — ED NOTES
Pt ambulated to and from bathroom without assistance. Pt updated that transport will take her to stress test soon and will then go to floor. No questions at this time.       Aj Goetz RN  07/23/19 6409

## 2019-07-23 NOTE — ED NOTES
Pt denying having any soda, chocolate, tea, or coffee. She states she did have a pepsi yesterday at 1300. This was relayed to Mae Gallo in stress lab.      Scott Beaver RN  07/23/19 6258

## 2019-07-23 NOTE — ED NOTES
Pt resting in bed. Updated pt on plan of care.  left and put his number on the board. No other questions at this time. Call light in reach. Respirations regular and even.      Marc Sweeney RN  07/23/19 3928

## 2019-07-24 VITALS
HEART RATE: 60 BPM | SYSTOLIC BLOOD PRESSURE: 138 MMHG | HEIGHT: 65 IN | BODY MASS INDEX: 34.87 KG/M2 | TEMPERATURE: 98 F | DIASTOLIC BLOOD PRESSURE: 67 MMHG | OXYGEN SATURATION: 99 % | WEIGHT: 209.3 LBS | RESPIRATION RATE: 18 BRPM

## 2019-07-24 PROCEDURE — 2580000003 HC RX 258: Performed by: INTERNAL MEDICINE

## 2019-07-24 PROCEDURE — 6360000002 HC RX W HCPCS: Performed by: INTERNAL MEDICINE

## 2019-07-24 PROCEDURE — 96376 TX/PRO/DX INJ SAME DRUG ADON: CPT

## 2019-07-24 PROCEDURE — 6370000000 HC RX 637 (ALT 250 FOR IP): Performed by: INTERNAL MEDICINE

## 2019-07-24 PROCEDURE — 99217 PR OBSERVATION CARE DISCHARGE MANAGEMENT: CPT | Performed by: INTERNAL MEDICINE

## 2019-07-24 PROCEDURE — G0378 HOSPITAL OBSERVATION PER HR: HCPCS

## 2019-07-24 PROCEDURE — 2709999900 HC NON-CHARGEABLE SUPPLY

## 2019-07-24 RX ORDER — ACETAMINOPHEN 325 MG/1
650 TABLET ORAL EVERY 4 HOURS PRN
Status: DISCONTINUED | OUTPATIENT
Start: 2019-07-24 | End: 2019-07-24 | Stop reason: HOSPADM

## 2019-07-24 RX ORDER — FEXOFENADINE HCL 180 MG/1
180 TABLET ORAL PRN
Qty: 30 TABLET | Refills: 1
Start: 2019-07-24 | End: 2020-01-03 | Stop reason: SDUPTHER

## 2019-07-24 RX ORDER — PANTOPRAZOLE SODIUM 40 MG/1
40 TABLET, DELAYED RELEASE ORAL
Qty: 30 TABLET | Refills: 0 | Status: SHIPPED | OUTPATIENT
Start: 2019-07-24 | End: 2019-08-15 | Stop reason: SDUPTHER

## 2019-07-24 RX ORDER — IBUPROFEN 400 MG/1
400 TABLET ORAL EVERY 6 HOURS PRN
Qty: 12 TABLET | Refills: 0 | Status: SHIPPED | OUTPATIENT
Start: 2019-07-24 | End: 2021-03-23 | Stop reason: ALTCHOICE

## 2019-07-24 RX ADMIN — ASPIRIN 81 MG: 81 TABLET ORAL at 09:11

## 2019-07-24 RX ADMIN — VITAMIN D, TAB 1000IU (100/BT) 1000 UNITS: 25 TAB at 09:11

## 2019-07-24 RX ADMIN — Medication 10 ML: at 09:51

## 2019-07-24 RX ADMIN — ACETAMINOPHEN 650 MG: 325 TABLET ORAL at 09:31

## 2019-07-24 RX ADMIN — FERROUS SULFATE TAB 325 MG (65 MG ELEMENTAL FE) 325 MG: 325 (65 FE) TAB at 09:11

## 2019-07-24 RX ADMIN — BUSPIRONE HYDROCHLORIDE 10 MG: 10 TABLET ORAL at 09:13

## 2019-07-24 RX ADMIN — CALCIUM CARBONATE-VITAMIN D TAB 500 MG-200 UNIT 1 TABLET: 500-200 TAB at 09:13

## 2019-07-24 RX ADMIN — ONDANSETRON 4 MG: 2 INJECTION INTRAMUSCULAR; INTRAVENOUS at 09:50

## 2019-07-24 RX ADMIN — METOPROLOL TARTRATE 25 MG: 25 TABLET, FILM COATED ORAL at 09:12

## 2019-07-24 RX ADMIN — Medication 500 MG: at 09:11

## 2019-07-24 ASSESSMENT — PAIN SCALES - GENERAL
PAINLEVEL_OUTOF10: 0
PAINLEVEL_OUTOF10: 5

## 2019-07-24 NOTE — CARE COORDINATION
CASE MANAGEMENT OBSERVATION NOTE       7/24/19, 6:59 AM    Kimber Espinoza       Admitted from: ER 7/22/2019/ 2136   Location: -04/004-A Reason for admit: Chest pain [R07.9]   Admit order signed?: yes    Procedure: 7-24-19 Stress Test    Pertinent Info/Orders/Treatment Plan:  Telemetry. Troponin neg. Stress Test done. CTA chest and CT Abd. PCP: YOBANY Gillette CNP    Discharge Plan: Met with pt today. She is up an about in room. She is from home with spouse. Self sufficient. She works at [employer]. She has no services or DME's. She has a PCP, she drives and denies issues getting medications. 7/24/19, 8:20 AM    Discharge plan discussed by  and . Discharge plan reviewed with patient/ family. Patient/ family verbalize understanding of discharge plan and are in agreement with plan. Understanding was demonstrated using the teach back method. Potential discharge home in next 24 hours. Denies home going needs.

## 2019-07-24 NOTE — DISCHARGE SUMMARY
Hospital Medicine Discharge Summary      Patient Identification:   Sandy Salcedo   : 1965  MRN: [id number]   Account: [de-identified]      Patient's PCP: Tessa Tillman, YOBANY - CNP    Admit Date: 2019     Discharge Date:   2019     Admitting Physician: Fahad Puckett DO     Discharge Physician: Kathy Fernando MD     Discharge Diagnoses:    Suspicion for costochondritis  Ruled out acute coronary syndrome  Thyroid nodules multiple  Suspicion for retrosternal goiter versus lymphadenopathy  Essential hypertension  Anxiety disorder  Hypertriglyceridemia  History of blood clots  Renal cysts      The patient was seen and examined on day of discharge and this discharge summary is in conjunction with any daily progress note from day of discharge. Hospital Course:   Sandy Salcedo is a 49 y.o. female admitted to [address] [place] [address] on 2019 for chest pain. Patient was complaining of chest pain in the mid retrosternal area in the lower that began around 6:00 one night prior to hospital presentation patient apparently ate dinner. In approximately an hour after that she started having some pain and thought it was probably heartburn and took Tums, probably close to 7 of them and did not have any relief and eventually came to the hospital.  As per the  she was also complaining of some nausea . Patient states that the pain was going to both the sides of the chest sometimes and she denied any chest pain on exertion. Serial troponins were doneBecause of patient having family history of coronary artery disease and were negative and had a stress test done and was negative for ischemia.   CT of the abdomen was done initially because of the patient pointing to the lower abdominal pain and there was no pancreatitis.   d-dimer was done and was high at 996 and subsequently had CTA of the chest and was negative for PE but did show thyroid nodules multiple along with possible retrosternal goiter 104 07/23/2019    CO2 26 07/23/2019    BUN 13 07/23/2019    CREATININE 0.6 07/23/2019    CALCIUM 9.5 07/23/2019    PHOS 2.2 08/20/2013         Significant Diagnostic Studies    Radiology:   US RENAL COMPLETE   Final Result       1. Multiple nonvascular cysts are seen arising from the left kidney. 2. Mild thinning of the right renal cortex. **This report has been created using voice recognition software. It may contain minor errors which are inherent in voice recognition technology. **      Final report electronically signed by Dr Aurea Bucio on 7/23/2019 8:25 AM      CTA Chest W WO Contrast   Final Result      No acute pulmonary embolism. No acute pneumonia. Bilateral upper and lower lobe and lingular atelectasis. Enlarging thyroid nodules, recommend nonemergent thyroid ultrasound. Enlarging anterior mediastinal probable adenopathy as is is probably separate from the thyroid nodules however cannot entirely exclude retromanubrial extension of a thyroid nodule. **This report has been created using voice recognition software. It may contain minor errors which are inherent in voice recognition technology. **      Final report electronically signed by Dr. Evelin Freedman on 7/23/2019 1:31 AM      CT ABDOMEN PELVIS WO CONTRAST Additional Contrast? None   Final Result   Dilated appendix without periappendiceal inflammatory changes, somewhat similar compared to the previous study. This may be normal for this patient however correlate clinically. Multiple left renal lesions as discussed above, some new compared to the prior study, recommend nonemergent ultrasound for further evaluation. No acute inflammatory/infectious process in the abdomen or pelvis. No evidence of bowel obstruction. **This report has been created using voice recognition software. It may contain minor errors which are inherent in voice recognition technology. **      Final report electronically signed

## 2019-07-24 NOTE — PLAN OF CARE
Problem: Falls - Risk of:  Goal: Will remain free from falls  Description  Will remain free from falls  Outcome: Ongoing  Note:   Patient absent of falls this shift, fall band intact, bed alarm set, falling star magnet in place. Goal: Absence of physical injury  Description  Absence of physical injury  Outcome: Ongoing  Note:   Patient absent of physical injury this shift. Problem: Discharge Planning:  Goal: Discharged to appropriate level of care  Description  Discharged to appropriate level of care  Outcome: Ongoing  Note:   Patient plans to return home at discharge. Patient has no new needs at home. Problem: Cardiac Output - Decreased:  Goal: Hemodynamic stability will improve  Description  Hemodynamic stability will improve  Outcome: Ongoing  Note:   Patient is hemodynamically stable, blood pressure is within normal parameters, heart sounds within normal limits. Problem: Tissue Perfusion - Cardiopulmonary, Altered:  Goal: Absence of angina  Description  Absence of angina  Outcome: Ongoing  Note:   Patient chest pain free this shift. If chest pain is voiced EKG ordered and MD notified. Goal: Circulatory function within specified parameters  Description  Circulatory function within specified parameters  Outcome: Ongoing  Note:   Monitoring the circulatory function. Within specified parameters at this time. Care plan reviewed with patient. Patient verbalize understanding of the plan of care and contribute to goal setting.

## 2019-07-31 ENCOUNTER — OFFICE VISIT (OUTPATIENT)
Dept: FAMILY MEDICINE CLINIC | Age: 54
End: 2019-07-31
Payer: COMMERCIAL

## 2019-07-31 VITALS
HEART RATE: 80 BPM | SYSTOLIC BLOOD PRESSURE: 136 MMHG | OXYGEN SATURATION: 98 % | DIASTOLIC BLOOD PRESSURE: 82 MMHG | WEIGHT: 207 LBS | RESPIRATION RATE: 16 BRPM | BODY MASS INDEX: 34.45 KG/M2

## 2019-07-31 DIAGNOSIS — F41.9 ANXIETY: ICD-10-CM

## 2019-07-31 DIAGNOSIS — E78.1 PURE HYPERGLYCERIDEMIA: ICD-10-CM

## 2019-07-31 DIAGNOSIS — E04.2 MULTIPLE THYROID NODULES: ICD-10-CM

## 2019-07-31 DIAGNOSIS — M94.0 COSTAL CHONDRITIS: Primary | ICD-10-CM

## 2019-07-31 DIAGNOSIS — E66.09 CLASS 1 OBESITY DUE TO EXCESS CALORIES WITH SERIOUS COMORBIDITY AND BODY MASS INDEX (BMI) OF 33.0 TO 33.9 IN ADULT: ICD-10-CM

## 2019-07-31 DIAGNOSIS — I10 ESSENTIAL HYPERTENSION: ICD-10-CM

## 2019-07-31 PROCEDURE — 99214 OFFICE O/P EST MOD 30 MIN: CPT | Performed by: NURSE PRACTITIONER

## 2019-07-31 PROCEDURE — 1111F DSCHRG MED/CURRENT MED MERGE: CPT | Performed by: NURSE PRACTITIONER

## 2019-07-31 PROCEDURE — 96372 THER/PROPH/DIAG INJ SC/IM: CPT | Performed by: NURSE PRACTITIONER

## 2019-07-31 RX ORDER — METHYLPREDNISOLONE ACETATE 80 MG/ML
80 INJECTION, SUSPENSION INTRA-ARTICULAR; INTRALESIONAL; INTRAMUSCULAR; SOFT TISSUE ONCE
Status: COMPLETED | OUTPATIENT
Start: 2019-07-31 | End: 2019-07-31

## 2019-07-31 RX ADMIN — METHYLPREDNISOLONE ACETATE 80 MG: 80 INJECTION, SUSPENSION INTRA-ARTICULAR; INTRALESIONAL; INTRAMUSCULAR; SOFT TISSUE at 11:00

## 2019-07-31 ASSESSMENT — ENCOUNTER SYMPTOMS
COUGH: 0
SINUS PRESSURE: 0
CHEST TIGHTNESS: 0
COLOR CHANGE: 0
ABDOMINAL PAIN: 0
SINUS PAIN: 0
RHINORRHEA: 0
SHORTNESS OF BREATH: 0
BACK PAIN: 0
CONSTIPATION: 0
SORE THROAT: 0
EYE REDNESS: 0
EYE ITCHING: 0
TROUBLE SWALLOWING: 0
PHOTOPHOBIA: 0
ABDOMINAL DISTENTION: 0
DIARRHEA: 0
EYE DISCHARGE: 0
BLOOD IN STOOL: 0
WHEEZING: 0
VOMITING: 0
EYE PAIN: 0
NAUSEA: 0

## 2019-07-31 NOTE — PATIENT INSTRUCTIONS
lightheadedness. ? A fast or uneven pulse. After calling 911, chew 1 adult-strength aspirin. Wait for an ambulance. Do not try to drive yourself.     · You have severe trouble breathing.    Call your doctor now or seek immediate medical care if:    · You have a fever or cough.     · You have any trouble breathing.     · Your chest pain gets worse.    Watch closely for changes in your health, and be sure to contact your doctor if:    · Your chest pain continues even though you are taking anti-inflammatory medicine.     · Your chest wall pain has not improved after 5 to 7 days. Where can you learn more? Go to https://[employer]. org and sign in to your [employer] account. Enter L121 in the [employer] box to learn more about \"Costochondritis: Care Instructions. \"     If you do not have an account, please click on the \"Sign Up Now\" link. Current as of: September 23, 2018  Content Version: 12.0  © 2012-1078 [employer]wise, Incorporated. Care instructions adapted under license by [facility] ([facility]). If you have questions about a medical condition or this instruction, always ask your healthcare professional. [place] [address] any warranty or liability for your use of this information.

## 2019-08-05 ENCOUNTER — HOSPITAL ENCOUNTER (OUTPATIENT)
Dept: ULTRASOUND IMAGING | Age: 54
Discharge: HOME | End: 2019-08-05
Payer: COMMERCIAL

## 2019-08-05 DIAGNOSIS — E04.2 MULTIPLE THYROID NODULES: ICD-10-CM

## 2019-08-05 PROCEDURE — 76536 US EXAM OF HEAD AND NECK: CPT

## 2019-08-06 DIAGNOSIS — E04.2 GOITER, NONTOXIC, MULTINODULAR: Primary | ICD-10-CM

## 2019-08-13 ENCOUNTER — HOSPITAL ENCOUNTER (OUTPATIENT)
Dept: ULTRASOUND IMAGING | Age: 54
Discharge: HOME | End: 2019-08-13
Payer: COMMERCIAL

## 2019-08-13 DIAGNOSIS — E04.2 GOITER, NONTOXIC, MULTINODULAR: ICD-10-CM

## 2019-08-13 PROCEDURE — 76942 ECHO GUIDE FOR BIOPSY: CPT

## 2019-08-13 PROCEDURE — 88172 CYTP DX EVAL FNA 1ST EA SITE: CPT

## 2019-08-13 PROCEDURE — 88177 CYTP FNA EVAL EA ADDL: CPT

## 2019-08-13 PROCEDURE — 88173 CYTOPATH EVAL FNA REPORT: CPT

## 2019-08-13 NOTE — PROGRESS NOTES
Formulation and discussion of sedation / procedure plans, risks, benefits, side effects and alternatives with patient and/or responsible adult completed.     Electronically signed by Kaylah Da Silva MD on 8/13/2019 at 12:48 PM

## 2019-08-15 ENCOUNTER — OFFICE VISIT (OUTPATIENT)
Dept: FAMILY MEDICINE CLINIC | Age: 54
End: 2019-08-15
Payer: COMMERCIAL

## 2019-08-15 VITALS
BODY MASS INDEX: 34.11 KG/M2 | SYSTOLIC BLOOD PRESSURE: 130 MMHG | OXYGEN SATURATION: 97 % | WEIGHT: 205 LBS | RESPIRATION RATE: 14 BRPM | DIASTOLIC BLOOD PRESSURE: 78 MMHG | HEART RATE: 72 BPM

## 2019-08-15 DIAGNOSIS — K21.00 GASTROESOPHAGEAL REFLUX DISEASE WITH ESOPHAGITIS: ICD-10-CM

## 2019-08-15 DIAGNOSIS — C73 PAPILLARY THYROID CARCINOMA (HCC): Primary | ICD-10-CM

## 2019-08-15 PROCEDURE — 99213 OFFICE O/P EST LOW 20 MIN: CPT | Performed by: NURSE PRACTITIONER

## 2019-08-15 RX ORDER — PANTOPRAZOLE SODIUM 40 MG/1
40 TABLET, DELAYED RELEASE ORAL
Qty: 30 TABLET | Refills: 5 | Status: SHIPPED | OUTPATIENT
Start: 2019-08-15 | End: 2020-06-02

## 2019-08-15 RX ORDER — PANTOPRAZOLE SODIUM 40 MG/1
40 TABLET, DELAYED RELEASE ORAL
Qty: 30 TABLET | Refills: 0 | Status: SHIPPED | OUTPATIENT
Start: 2019-08-15 | End: 2019-08-15 | Stop reason: SDUPTHER

## 2019-08-15 ASSESSMENT — ENCOUNTER SYMPTOMS
WHEEZING: 0
SORE THROAT: 1
PHOTOPHOBIA: 0
EYE ITCHING: 0
EYE PAIN: 0
EYE DISCHARGE: 0
ABDOMINAL DISTENTION: 0
NAUSEA: 0
COLOR CHANGE: 0
RHINORRHEA: 0
VOMITING: 0
SINUS PAIN: 0
BACK PAIN: 0
CONSTIPATION: 0
TROUBLE SWALLOWING: 0
DIARRHEA: 0
ABDOMINAL PAIN: 0
CHEST TIGHTNESS: 0
BLOOD IN STOOL: 0
COUGH: 0
SINUS PRESSURE: 0
SHORTNESS OF BREATH: 0
EYE REDNESS: 0

## 2019-08-15 NOTE — PROGRESS NOTES
re    [address] [facility] [address] [place] [address] [facility]  Dept: [phone]  Dept Fax: [phone]: [phone]     Visit Date:  8/15/2019      Patient:  Kwan White  YOB: 1965    HPI:     Chief Complaint   Patient presents with    Other     discuss biopsy        Here to discuss biopsy results. Medications    Current Outpatient Medications:     pantoprazole (PROTONIX) 40 MG tablet, Take 1 tablet by mouth every morning (before breakfast) To take on empty stomach 45 min before eating,, Disp: 30 tablet, Rfl: 5    losartan (COZAAR) 50 MG tablet, Take 1 tablet by mouth daily, Disp: 30 tablet, Rfl: 3    metoprolol tartrate (LOPRESSOR) 25 MG tablet, TAKE ONE TABLET BY MOUTH TWICE DAILY, Disp: 180 tablet, Rfl: 3    busPIRone (BUSPAR) 10 MG tablet, Take 1 tablet by mouth 2 times daily, Disp: 180 tablet, Rfl: 3    Ascorbic Acid (VITAMIN C) 100 MG tablet, Take 500 mg by mouth daily , Disp: , Rfl:     aspirin EC 81 MG EC tablet, Take 1 tablet by mouth daily, Disp: 30 tablet, Rfl: 3    ferrous sulfate 325 (65 FE) MG tablet, Take 325 mg by mouth daily (with breakfast), Disp: , Rfl:     vitamin D 1000 UNITS CAPS, Take 1 capsule by mouth daily, Disp: , Rfl:     calcium-vitamin D (OSCAL-500) 500-200 MG-UNIT per tablet, Take 1 tablet by mouth daily Calcium 600 mg supplement 1 tab daily, Disp: , Rfl:     fexofenadine (ALLEGRA) 180 MG tablet, Take 1 tablet by mouth as needed (allergies) (Patient not taking: Reported on 8/15/2019), Disp: 30 tablet, Rfl: 1    ibuprofen (ADVIL;MOTRIN) 400 MG tablet, Take 1 tablet by mouth every 6 hours as needed for Pain, Disp: 12 tablet, Rfl: 0    The patient is allergic to morphine; codeine; and seasonal.    Past Medical History  Elizabeth Pinto  has a past medical history of Anxiety, Arthritis, Hx of blood clots, Hypertension, and Pure hyperglyceridemia.     Subjective:      Review of Systems   Constitutional: Negative. Negative for activity change, appetite change, fatigue, fever and unexpected weight change. HENT: Positive for sore throat (scratchy throat and loses her voice). Negative for congestion, dental problem, ear pain, hearing loss, mouth sores, rhinorrhea, sinus pressure, sinus pain and trouble swallowing. Eyes: Negative for photophobia, pain, discharge, redness, itching and visual disturbance. Respiratory: Negative for cough, chest tightness, shortness of breath and wheezing. Cardiovascular: Negative for chest pain, palpitations and leg swelling. Gastrointestinal: Negative for abdominal distention, abdominal pain, blood in stool, constipation, diarrhea, nausea and vomiting. Endocrine: Negative for cold intolerance, heat intolerance, polydipsia, polyphagia and polyuria. Genitourinary: Negative for difficulty urinating, dysuria, frequency and hematuria. Musculoskeletal: Negative for arthralgias, back pain, gait problem, joint swelling, myalgias, neck pain and neck stiffness. Skin: Negative for color change, pallor, rash and wound. Allergic/Immunologic: Negative for environmental allergies and food allergies. Neurological: Negative for dizziness, tremors, seizures, speech difficulty, weakness, numbness and headaches. Hematological: Negative for adenopathy. Does not bruise/bleed easily. Psychiatric/Behavioral: Negative for behavioral problems, confusion, decreased concentration and sleep disturbance. The patient is not hyperactive. Objective:     /78   Pulse 72   Resp 14   Wt 205 lb (93 kg)   SpO2 97%   BMI 34.11 kg/m²     Physical Exam   Constitutional: She is oriented to person, place, and time. She appears well-developed and well-nourished. HENT:   Head: Normocephalic and atraumatic.    Right Ear: External ear normal.   Left Ear: External ear normal.   Nose: Nose normal.   Mouth/Throat: Oropharynx is clear and moist.   Eyes: Pupils are equal, round, and reactive

## 2019-08-22 ENCOUNTER — TELEPHONE (OUTPATIENT)
Dept: FAMILY MEDICINE CLINIC | Age: 54
End: 2019-08-22

## 2019-08-22 DIAGNOSIS — L20.84 INTRINSIC ECZEMA: Primary | ICD-10-CM

## 2019-09-18 ENCOUNTER — TELEPHONE (OUTPATIENT)
Dept: FAMILY MEDICINE CLINIC | Age: 54
End: 2019-09-18

## 2019-09-18 DIAGNOSIS — I10 ESSENTIAL HYPERTENSION: ICD-10-CM

## 2019-09-18 RX ORDER — LOSARTAN POTASSIUM 50 MG/1
50 TABLET ORAL DAILY
Qty: 30 TABLET | Refills: 11 | Status: SHIPPED | OUTPATIENT
Start: 2019-09-18 | End: 2019-10-16 | Stop reason: SDUPTHER

## 2019-09-21 ENCOUNTER — HOSPITAL ENCOUNTER (OUTPATIENT)
Age: 54
Discharge: HOME | End: 2019-09-21
Payer: COMMERCIAL

## 2019-09-21 DIAGNOSIS — E55.9 VITAMIN D DEFICIENCY: ICD-10-CM

## 2019-09-21 DIAGNOSIS — I10 ESSENTIAL HYPERTENSION: ICD-10-CM

## 2019-09-21 DIAGNOSIS — E78.1 PURE HYPERGLYCERIDEMIA: ICD-10-CM

## 2019-09-21 LAB
ALBUMIN SERPL-MCNC: 4.6 G/DL (ref 3.5–5.1)
ALP BLD-CCNC: 128 U/L (ref 38–126)
ALT SERPL-CCNC: 24 U/L (ref 11–66)
ANION GAP SERPL CALCULATED.3IONS-SCNC: 13 MEQ/L (ref 8–16)
AST SERPL-CCNC: 19 U/L (ref 5–40)
BACTERIA: ABNORMAL
BILIRUB SERPL-MCNC: 0.4 MG/DL (ref 0.3–1.2)
BILIRUBIN URINE: NEGATIVE
BLOOD, URINE: ABNORMAL
BUN BLDV-MCNC: 19 MG/DL (ref 7–22)
CALCIUM SERPL-MCNC: 10.1 MG/DL (ref 8.5–10.5)
CASTS: ABNORMAL /LPF
CHARACTER, URINE: ABNORMAL
CHLORIDE BLD-SCNC: 104 MEQ/L (ref 98–111)
CHOLESTEROL, TOTAL: 192 MG/DL (ref 100–199)
CO2: 27 MEQ/L (ref 23–33)
COLOR: YELLOW
CREAT SERPL-MCNC: 0.8 MG/DL (ref 0.4–1.2)
CREATININE, URINE: 180.8 MG/DL
CRYSTALS: ABNORMAL
EPITHELIAL CELLS, UA: ABNORMAL /HPF
ERYTHROCYTE [DISTWIDTH] IN BLOOD BY AUTOMATED COUNT: 13.1 % (ref 11.5–14.5)
ERYTHROCYTE [DISTWIDTH] IN BLOOD BY AUTOMATED COUNT: 44.7 FL (ref 35–45)
GFR SERPL CREATININE-BSD FRML MDRD: 75 ML/MIN/1.73M2
GLUCOSE BLD-MCNC: 96 MG/DL (ref 70–108)
GLUCOSE, URINE: NEGATIVE MG/DL
HCT VFR BLD CALC: 42.1 % (ref 37–47)
HDLC SERPL-MCNC: 83 MG/DL
HEMOGLOBIN: 13.6 GM/DL (ref 12–16)
KETONES, URINE: NEGATIVE
LDL CHOLESTEROL CALCULATED: 100 MG/DL
LEUKOCYTE EST, POC: NEGATIVE
MAGNESIUM: 1.9 MG/DL (ref 1.6–2.4)
MCH RBC QN AUTO: 30.2 PG (ref 26–33)
MCHC RBC AUTO-ENTMCNC: 32.3 GM/DL (ref 32.2–35.5)
MCV RBC AUTO: 93.3 FL (ref 81–99)
MICROALBUMIN UR-MCNC: < 1.2 MG/DL
MICROALBUMIN/CREAT UR-RTO: 7 MG/G (ref 0–30)
MUCUS: ABNORMAL
NITRITE, URINE: NEGATIVE
PH UA: 5 (ref 5–9)
PLATELET # BLD: 217 THOU/MM3 (ref 130–400)
PMV BLD AUTO: 10.6 FL (ref 9.4–12.4)
POTASSIUM SERPL-SCNC: 3.8 MEQ/L (ref 3.5–5.2)
PROTEIN UA: NEGATIVE MG/DL
RBC # BLD: 4.51 MILL/MM3 (ref 4.2–5.4)
RBC URINE: ABNORMAL /HPF
RENAL EPITHELIAL, UA: ABNORMAL
SODIUM BLD-SCNC: 144 MEQ/L (ref 135–145)
SPECIFIC GRAVITY UA: 1.02 (ref 1–1.03)
T4 FREE: 1.42 NG/DL (ref 0.93–1.76)
TOTAL PROTEIN: 7.5 G/DL (ref 6.1–8)
TRIGL SERPL-MCNC: 43 MG/DL (ref 0–199)
TSH SERPL DL<=0.05 MIU/L-ACNC: 1.16 UIU/ML (ref 0.4–4.2)
UROBILINOGEN, URINE: 0.2 EU/DL (ref 0–1)
VITAMIN D 25-HYDROXY: 37 NG/ML (ref 30–100)
WBC # BLD: 5.7 THOU/MM3 (ref 4.8–10.8)
WBC UA: ABNORMAL /HPF
YEAST: ABNORMAL

## 2019-09-21 PROCEDURE — 80061 LIPID PANEL: CPT

## 2019-09-21 PROCEDURE — 83735 ASSAY OF MAGNESIUM: CPT

## 2019-09-21 PROCEDURE — 84443 ASSAY THYROID STIM HORMONE: CPT

## 2019-09-21 PROCEDURE — 82043 UR ALBUMIN QUANTITATIVE: CPT

## 2019-09-21 PROCEDURE — 84439 ASSAY OF FREE THYROXINE: CPT

## 2019-09-21 PROCEDURE — 85027 COMPLETE CBC AUTOMATED: CPT

## 2019-09-21 PROCEDURE — 82306 VITAMIN D 25 HYDROXY: CPT

## 2019-09-21 PROCEDURE — 36415 COLL VENOUS BLD VENIPUNCTURE: CPT

## 2019-09-21 PROCEDURE — 80053 COMPREHEN METABOLIC PANEL: CPT

## 2019-09-21 PROCEDURE — 81001 URINALYSIS AUTO W/SCOPE: CPT

## 2019-10-16 ENCOUNTER — OFFICE VISIT (OUTPATIENT)
Dept: FAMILY MEDICINE CLINIC | Age: 54
End: 2019-10-16
Payer: COMMERCIAL

## 2019-10-16 VITALS
BODY MASS INDEX: 34.61 KG/M2 | HEART RATE: 83 BPM | DIASTOLIC BLOOD PRESSURE: 86 MMHG | SYSTOLIC BLOOD PRESSURE: 134 MMHG | TEMPERATURE: 97.6 F | WEIGHT: 208 LBS | RESPIRATION RATE: 18 BRPM | OXYGEN SATURATION: 97 %

## 2019-10-16 DIAGNOSIS — M79.672 HEEL PAIN, CHRONIC, LEFT: ICD-10-CM

## 2019-10-16 DIAGNOSIS — G89.29 HEEL PAIN, CHRONIC, LEFT: ICD-10-CM

## 2019-10-16 DIAGNOSIS — M85.80 OSTEOPENIA AFTER MENOPAUSE: ICD-10-CM

## 2019-10-16 DIAGNOSIS — Z23 IMMUNIZATION DUE: ICD-10-CM

## 2019-10-16 DIAGNOSIS — C73 PRIMARY THYROID CANCER (HCC): ICD-10-CM

## 2019-10-16 DIAGNOSIS — I10 ESSENTIAL HYPERTENSION: Primary | ICD-10-CM

## 2019-10-16 DIAGNOSIS — Z78.0 OSTEOPENIA AFTER MENOPAUSE: ICD-10-CM

## 2019-10-16 PROCEDURE — 99214 OFFICE O/P EST MOD 30 MIN: CPT | Performed by: NURSE PRACTITIONER

## 2019-10-16 PROCEDURE — 90686 IIV4 VACC NO PRSV 0.5 ML IM: CPT | Performed by: NURSE PRACTITIONER

## 2019-10-16 PROCEDURE — 90471 IMMUNIZATION ADMIN: CPT | Performed by: NURSE PRACTITIONER

## 2019-10-16 RX ORDER — NYSTATIN 100000 [USP'U]/G
POWDER TOPICAL
Refills: 5 | COMMUNITY
Start: 2019-09-24 | End: 2020-12-02 | Stop reason: ALTCHOICE

## 2019-10-16 RX ORDER — LOSARTAN POTASSIUM 50 MG/1
50 TABLET ORAL DAILY
Qty: 90 TABLET | Refills: 3 | Status: SHIPPED | OUTPATIENT
Start: 2019-10-16 | End: 2020-10-28 | Stop reason: SDUPTHER

## 2019-10-16 ASSESSMENT — ENCOUNTER SYMPTOMS
BACK PAIN: 0
NAUSEA: 0
RHINORRHEA: 0
EYE ITCHING: 0
EYE DISCHARGE: 0
COLOR CHANGE: 0
BLOOD IN STOOL: 0
PHOTOPHOBIA: 0
SHORTNESS OF BREATH: 0
SINUS PRESSURE: 0
ABDOMINAL PAIN: 0
TROUBLE SWALLOWING: 0
CHEST TIGHTNESS: 0
EYE REDNESS: 0
COUGH: 0
EYE PAIN: 0
CONSTIPATION: 0
VOMITING: 0
SINUS PAIN: 0
ABDOMINAL DISTENTION: 0
DIARRHEA: 0
SORE THROAT: 0
WHEEZING: 0

## 2019-12-18 ENCOUNTER — TELEPHONE (OUTPATIENT)
Dept: FAMILY MEDICINE CLINIC | Age: 54
End: 2019-12-18

## 2019-12-19 DIAGNOSIS — E89.0 POSTOPERATIVE HYPOTHYROIDISM: Primary | ICD-10-CM

## 2019-12-19 RX ORDER — LEVOTHYROXINE SODIUM 0.15 MG/1
150 TABLET ORAL DAILY
Qty: 90 TABLET | Refills: 1 | Status: SHIPPED | OUTPATIENT
Start: 2019-12-19 | End: 2021-12-16 | Stop reason: ALTCHOICE

## 2020-01-03 RX ORDER — FEXOFENADINE HCL 180 MG/1
180 TABLET ORAL PRN
Qty: 30 TABLET | Refills: 1 | Status: CANCELLED | OUTPATIENT
Start: 2020-01-03

## 2020-01-03 RX ORDER — FEXOFENADINE HCL 180 MG/1
180 TABLET ORAL PRN
Qty: 30 TABLET | Refills: 5 | Status: SHIPPED | OUTPATIENT
Start: 2020-01-03 | End: 2020-06-02 | Stop reason: SDUPTHER

## 2020-01-03 NOTE — TELEPHONE ENCOUNTER
Christiano Reina called requesting a refill on the following medications:  Requested Prescriptions     Pending Prescriptions Disp Refills    fexofenadine (ALLEGRA) 180 MG tablet 30 tablet 1     Sig: Take 1 tablet by mouth as needed (allergies)     Pharmacy verified: [facility]      Date of last visit: 10/16/19  Date of next visit (if applicable): 3/28/7040

## 2020-01-06 ENCOUNTER — HOSPITAL ENCOUNTER (OUTPATIENT)
Age: 55
Discharge: HOME | End: 2020-01-06
Payer: COMMERCIAL

## 2020-01-06 LAB — TSH SERPL DL<=0.05 MIU/L-ACNC: 0.22 UIU/ML (ref 0.4–4.2)

## 2020-01-06 PROCEDURE — 36415 COLL VENOUS BLD VENIPUNCTURE: CPT

## 2020-01-06 PROCEDURE — 86800 THYROGLOBULIN ANTIBODY: CPT

## 2020-01-06 PROCEDURE — 84443 ASSAY THYROID STIM HORMONE: CPT

## 2020-01-06 PROCEDURE — 84432 ASSAY OF THYROGLOBULIN: CPT

## 2020-01-07 LAB — THYROGLOBULIN: NORMAL

## 2020-01-21 ENCOUNTER — TELEPHONE (OUTPATIENT)
Dept: CARDIOLOGY CLINIC | Age: 55
End: 2020-01-21

## 2020-02-03 ENCOUNTER — HOSPITAL ENCOUNTER (OUTPATIENT)
Dept: WOMENS IMAGING | Age: 55
Discharge: HOME | End: 2020-02-03
Payer: COMMERCIAL

## 2020-02-03 PROCEDURE — 77063 BREAST TOMOSYNTHESIS BI: CPT

## 2020-02-10 ENCOUNTER — OFFICE VISIT (OUTPATIENT)
Dept: FAMILY MEDICINE CLINIC | Age: 55
End: 2020-02-10
Payer: COMMERCIAL

## 2020-02-10 VITALS
RESPIRATION RATE: 16 BRPM | TEMPERATURE: 98.4 F | OXYGEN SATURATION: 98 % | WEIGHT: 211 LBS | HEART RATE: 74 BPM | BODY MASS INDEX: 35.11 KG/M2 | SYSTOLIC BLOOD PRESSURE: 132 MMHG | DIASTOLIC BLOOD PRESSURE: 86 MMHG

## 2020-02-10 PROBLEM — C73 PAPILLARY THYROID CARCINOMA (HCC): Status: ACTIVE | Noted: 2019-10-16

## 2020-02-10 PROCEDURE — 99213 OFFICE O/P EST LOW 20 MIN: CPT | Performed by: NURSE PRACTITIONER

## 2020-02-10 PROCEDURE — 96372 THER/PROPH/DIAG INJ SC/IM: CPT | Performed by: NURSE PRACTITIONER

## 2020-02-10 RX ORDER — MELOXICAM 15 MG/1
15 TABLET ORAL DAILY
Qty: 30 TABLET | Refills: 5 | Status: SHIPPED | OUTPATIENT
Start: 2020-02-10 | End: 2020-06-02 | Stop reason: SDUPTHER

## 2020-02-10 RX ORDER — PANTOPRAZOLE SODIUM 40 MG/1
TABLET, DELAYED RELEASE ORAL
COMMUNITY
Start: 2020-01-03 | End: 2020-04-14 | Stop reason: SDUPTHER

## 2020-02-10 RX ORDER — METHYLPREDNISOLONE ACETATE 80 MG/ML
80 INJECTION, SUSPENSION INTRA-ARTICULAR; INTRALESIONAL; INTRAMUSCULAR; SOFT TISSUE ONCE
Status: COMPLETED | OUTPATIENT
Start: 2020-02-10 | End: 2020-02-10

## 2020-02-10 RX ADMIN — METHYLPREDNISOLONE ACETATE 80 MG: 80 INJECTION, SUSPENSION INTRA-ARTICULAR; INTRALESIONAL; INTRAMUSCULAR; SOFT TISSUE at 11:48

## 2020-02-10 ASSESSMENT — ENCOUNTER SYMPTOMS
SHORTNESS OF BREATH: 0
EYE DISCHARGE: 0
SINUS PAIN: 0
EYE PAIN: 0
BLOOD IN STOOL: 0
WHEEZING: 0
SORE THROAT: 0
ABDOMINAL PAIN: 0
RHINORRHEA: 0
TROUBLE SWALLOWING: 0
CHEST TIGHTNESS: 0
PHOTOPHOBIA: 0
NAUSEA: 0
SINUS PRESSURE: 0
BACK PAIN: 1
COLOR CHANGE: 0
COUGH: 0
DIARRHEA: 0
EYE REDNESS: 0
CONSTIPATION: 0
VOMITING: 0
EYE ITCHING: 0
ABDOMINAL DISTENTION: 0

## 2020-02-10 ASSESSMENT — PATIENT HEALTH QUESTIONNAIRE - PHQ9
SUM OF ALL RESPONSES TO PHQ QUESTIONS 1-9: 0
2. FEELING DOWN, DEPRESSED OR HOPELESS: 0
SUM OF ALL RESPONSES TO PHQ QUESTIONS 1-9: 0
SUM OF ALL RESPONSES TO PHQ9 QUESTIONS 1 & 2: 0
1. LITTLE INTEREST OR PLEASURE IN DOING THINGS: 0

## 2020-02-10 NOTE — LETTER
[address], [address] [facility] [address] [facility]  Phone: [phone]  Fax: [phone]    YOBANY Carlos CNP        February 10, 2020     Patient: Paulina Rodgers   YOB: 1965   Date of Visit: 2/10/2020       To Whom it May Concern:    Emilia Lopez was seen in my clinic on 2/10/2020. She may return to work on 10/13/2020. If you have any questions or concerns, please don't hesitate to call.     Sincerely,         YOBANY Carlos CNP

## 2020-02-10 NOTE — PROGRESS NOTES
Allergic/Immunologic: Negative for environmental allergies and food allergies. Neurological: Negative for dizziness, tremors, seizures, speech difficulty, weakness, numbness and headaches. Hematological: Negative for adenopathy. Does not bruise/bleed easily. Psychiatric/Behavioral: Positive for sleep disturbance (d/t being uncomfortable). Negative for behavioral problems, confusion and decreased concentration. The patient is not hyperactive. Objective:     /86   Pulse 74   Temp 98.4 °F (36.9 °C) (Oral)   Resp 16   Wt 211 lb (95.7 kg)   LMP 02/05/2016   SpO2 98%   BMI 35.11 kg/m²     Physical Exam  Vitals signs reviewed. Constitutional:       Appearance: She is well-developed. HENT:      Head: Normocephalic and atraumatic. Right Ear: External ear normal.      Left Ear: External ear normal.      Nose: Nose normal.   Eyes:      Conjunctiva/sclera: Conjunctivae normal.      Pupils: Pupils are equal, round, and reactive to light. Neck:      Musculoskeletal: Normal range of motion and neck supple. Thyroid: No thyromegaly. Trachea: No tracheal deviation. Cardiovascular:      Rate and Rhythm: Normal rate and regular rhythm. Heart sounds: Normal heart sounds. No murmur. Pulmonary:      Effort: Pulmonary effort is normal. No respiratory distress. Breath sounds: Normal breath sounds. No wheezing or rales. Abdominal:      General: Bowel sounds are normal. There is no distension. Palpations: Abdomen is soft. There is no mass. Tenderness: There is no abdominal tenderness. There is no guarding. Musculoskeletal: Normal range of motion. Back:    Lymphadenopathy:      Cervical: No cervical adenopathy. Skin:     General: Skin is warm and dry. Findings: No erythema or rash. Neurological:      Mental Status: She is alert and oriented to person, place, and time. Cranial Nerves: No cranial nerve deficit.       Deep Tendon Reflexes: Reflexes are normal and symmetric. Psychiatric:         Behavior: Behavior normal.         Thought Content: Thought content normal.         Judgment: Judgment normal.         Assessment/Plan:      Sarah Smith was seen today for pain. Diagnoses and all orders for this visit:    Osteoarthritis of pelvis  -     meloxicam (MOBIC) 15 MG tablet; Take 1 tablet by mouth daily  -     methylPREDNISolone acetate (DEPO-MEDROL) injection 80 mg  I do not see anything acute on the xray. Will see what radiologist sees. Treating with mobic and will give a steroid injection to help it along. Do not see anything that looks like CA has gone to bone. Sacroiliitis (HCC)  -     meloxicam (MOBIC) 15 MG tablet; Take 1 tablet by mouth daily  -     methylPREDNISolone acetate (DEPO-MEDROL) injection 80 mg    Chronic left-sided low back pain without sciatica  -     Cancel: XR PELVIS (MIN 3 VIEWS); Future        Return if symptoms worsen or fail to improve. Patient instructions given and reviewed.

## 2020-02-13 ENCOUNTER — TELEPHONE (OUTPATIENT)
Dept: FAMILY MEDICINE CLINIC | Age: 55
End: 2020-02-13

## 2020-02-13 NOTE — TELEPHONE ENCOUNTER
Cesar Banksing you saw Ms Marlo Mack on Monday the 10th we gave her 80mg of Depo and sent her home on [facility], she just called saying that she doesn't feel that this is helping and is still in a lot of pain. Is there anything else you can do or she can do is what she is asking.

## 2020-02-14 RX ORDER — BUSPIRONE HYDROCHLORIDE 10 MG/1
TABLET ORAL
Qty: 180 TABLET | Refills: 4 | Status: SHIPPED | OUTPATIENT
Start: 2020-02-14 | End: 2020-06-02 | Stop reason: SDUPTHER

## 2020-02-17 NOTE — TELEPHONE ENCOUNTER
Suzanna Low had wanted her to make a fu Tuesday if not feeling any better- please call and get her scheduled. Thanks.

## 2020-02-18 ENCOUNTER — OFFICE VISIT (OUTPATIENT)
Dept: FAMILY MEDICINE CLINIC | Age: 55
End: 2020-02-18
Payer: COMMERCIAL

## 2020-02-18 VITALS
SYSTOLIC BLOOD PRESSURE: 132 MMHG | DIASTOLIC BLOOD PRESSURE: 82 MMHG | RESPIRATION RATE: 20 BRPM | HEART RATE: 65 BPM | OXYGEN SATURATION: 98 % | BODY MASS INDEX: 35.54 KG/M2 | TEMPERATURE: 98 F | WEIGHT: 213.6 LBS

## 2020-02-18 PROCEDURE — 99214 OFFICE O/P EST MOD 30 MIN: CPT | Performed by: NURSE PRACTITIONER

## 2020-02-18 RX ORDER — BACLOFEN 10 MG/1
10 TABLET ORAL 3 TIMES DAILY
Qty: 90 TABLET | Refills: 3 | Status: SHIPPED | OUTPATIENT
Start: 2020-02-18 | End: 2020-06-02 | Stop reason: SDUPTHER

## 2020-02-18 RX ORDER — CHLORHEXIDINE GLUCONATE 0.12 MG/ML
RINSE ORAL
COMMUNITY
Start: 2020-02-17 | End: 2020-12-02 | Stop reason: ALTCHOICE

## 2020-02-18 RX ORDER — HYDROCODONE BITARTRATE AND ACETAMINOPHEN 5; 325 MG/1; MG/1
1 TABLET ORAL EVERY 4 HOURS PRN
Qty: 30 TABLET | Refills: 0 | Status: SHIPPED | OUTPATIENT
Start: 2020-02-18 | End: 2020-02-23

## 2020-02-18 ASSESSMENT — ENCOUNTER SYMPTOMS
COLOR CHANGE: 0
DIARRHEA: 0
EYE REDNESS: 0
EYE DISCHARGE: 0
EYE ITCHING: 0
SHORTNESS OF BREATH: 0
VOMITING: 0
SINUS PAIN: 0
BACK PAIN: 1
RHINORRHEA: 0
EYE PAIN: 0
ABDOMINAL PAIN: 0
BLOOD IN STOOL: 0
TROUBLE SWALLOWING: 0
WHEEZING: 0
SORE THROAT: 0
NAUSEA: 0
CHEST TIGHTNESS: 0
ABDOMINAL DISTENTION: 0
PHOTOPHOBIA: 0
CONSTIPATION: 0
COUGH: 0
SINUS PRESSURE: 0

## 2020-02-18 NOTE — PROGRESS NOTES
polyphagia and polyuria. Genitourinary: Negative for difficulty urinating, dysuria, frequency and hematuria. Musculoskeletal: Positive for back pain (and pelvis). Negative for arthralgias, gait problem, joint swelling, myalgias, neck pain and neck stiffness. Skin: Negative for color change, pallor, rash and wound. Allergic/Immunologic: Negative for environmental allergies and food allergies. Neurological: Negative for dizziness, tremors, seizures, speech difficulty, weakness, numbness and headaches. Hematological: Negative for adenopathy. Does not bruise/bleed easily. Psychiatric/Behavioral: Negative for behavioral problems, confusion, decreased concentration and sleep disturbance. The patient is not hyperactive. Objective:     /82   Pulse 65   Temp 98 °F (36.7 °C) (Oral)   Resp 20   Wt 213 lb 9.6 oz (96.9 kg)   LMP 02/05/2016   SpO2 98%   BMI 35.54 kg/m²     Physical Exam  Vitals signs reviewed. Constitutional:       Appearance: She is well-developed. HENT:      Head: Normocephalic and atraumatic. Right Ear: External ear normal.      Left Ear: External ear normal.      Nose: Nose normal.   Eyes:      Conjunctiva/sclera: Conjunctivae normal.      Pupils: Pupils are equal, round, and reactive to light. Neck:      Musculoskeletal: Normal range of motion and neck supple. Thyroid: No thyromegaly. Trachea: No tracheal deviation. Cardiovascular:      Rate and Rhythm: Normal rate and regular rhythm. Heart sounds: Normal heart sounds. No murmur. Pulmonary:      Effort: Pulmonary effort is normal. No respiratory distress. Breath sounds: Normal breath sounds. No wheezing or rales. Abdominal:      General: Bowel sounds are normal. There is no distension. Palpations: Abdomen is soft. There is no mass. Tenderness: There is no abdominal tenderness. There is no guarding. Musculoskeletal: Normal range of motion.    Lymphadenopathy:      Cervical: No

## 2020-03-04 ENCOUNTER — HOSPITAL ENCOUNTER (OUTPATIENT)
Age: 55
Discharge: HOME | End: 2020-03-04
Payer: COMMERCIAL

## 2020-03-04 LAB
BUN BLDV-MCNC: 24 MG/DL (ref 7–22)
CALCIUM SERPL-MCNC: 9.7 MG/DL (ref 8.5–10.5)
CREAT SERPL-MCNC: 0.6 MG/DL (ref 0.4–1.2)
GFR SERPL CREATININE-BSD FRML MDRD: > 90 ML/MIN/1.73M2
PHOSPHORUS: 3 MG/DL (ref 2.4–4.7)
TSH SERPL DL<=0.05 MIU/L-ACNC: 0.67 UIU/ML (ref 0.4–4.2)
VITAMIN D 25-HYDROXY: 31 NG/ML (ref 30–100)

## 2020-03-04 PROCEDURE — 36415 COLL VENOUS BLD VENIPUNCTURE: CPT

## 2020-03-04 PROCEDURE — 84443 ASSAY THYROID STIM HORMONE: CPT

## 2020-03-04 PROCEDURE — 86800 THYROGLOBULIN ANTIBODY: CPT

## 2020-03-04 PROCEDURE — 84100 ASSAY OF PHOSPHORUS: CPT

## 2020-03-04 PROCEDURE — 82306 VITAMIN D 25 HYDROXY: CPT

## 2020-03-04 PROCEDURE — 82565 ASSAY OF CREATININE: CPT

## 2020-03-04 PROCEDURE — 82310 ASSAY OF CALCIUM: CPT

## 2020-03-04 PROCEDURE — 84432 ASSAY OF THYROGLOBULIN: CPT

## 2020-03-04 PROCEDURE — 84520 ASSAY OF UREA NITROGEN: CPT

## 2020-03-05 LAB — THYROGLOBULIN: NORMAL

## 2020-03-06 ENCOUNTER — HOSPITAL ENCOUNTER (OUTPATIENT)
Dept: ULTRASOUND IMAGING | Age: 55
Discharge: HOME | End: 2020-03-06
Payer: COMMERCIAL

## 2020-03-06 PROCEDURE — 76770 US EXAM ABDO BACK WALL COMP: CPT

## 2020-03-09 ENCOUNTER — OFFICE VISIT (OUTPATIENT)
Dept: CARDIOLOGY CLINIC | Age: 55
End: 2020-03-09
Payer: COMMERCIAL

## 2020-03-09 VITALS
BODY MASS INDEX: 35.79 KG/M2 | DIASTOLIC BLOOD PRESSURE: 73 MMHG | HEART RATE: 61 BPM | SYSTOLIC BLOOD PRESSURE: 128 MMHG | HEIGHT: 65 IN | WEIGHT: 214.8 LBS

## 2020-03-09 PROCEDURE — 99213 OFFICE O/P EST LOW 20 MIN: CPT | Performed by: PHYSICIAN ASSISTANT

## 2020-03-09 RX ORDER — HYDROCODONE BITARTRATE AND ACETAMINOPHEN 5; 325 MG/1; MG/1
1 TABLET ORAL EVERY 6 HOURS PRN
COMMUNITY
End: 2020-12-30

## 2020-03-09 NOTE — PROGRESS NOTES
tablet Take 1 tablet by mouth as needed (allergies) 30 tablet 5    levothyroxine (SYNTHROID) 150 MCG tablet Take 1 tablet by mouth daily 90 tablet 1    NYAMYC 961710 UNIT/GM powder APPLY POWDER TOPICALLY THREE TIMES DAILY FOR 30 DAYS  5    losartan (COZAAR) 50 MG tablet Take 1 tablet by mouth daily 90 tablet 3    triamcinolone (KENALOG) 0.1 % ointment Apply topically 2 times daily as needed (eczema) 30 g 1    Ascorbic Acid (VITAMIN C) 100 MG tablet Take 500 mg by mouth daily       aspirin EC 81 MG EC tablet Take 1 tablet by mouth daily 30 tablet 3    ferrous sulfate 325 (65 FE) MG tablet Take 325 mg by mouth daily (with breakfast)      calcium-vitamin D (OSCAL-500) 500-200 MG-UNIT per tablet Take 1 tablet by mouth daily Calcium 600 mg supplement 1 tab daily      chlorhexidine (PERIDEX) 0.12 % solution       pantoprazole (PROTONIX) 40 MG tablet Take 1 tablet by mouth every morning (before breakfast) To take on empty stomach 45 min before eating, 30 tablet 5    ibuprofen (ADVIL;MOTRIN) 400 MG tablet Take 1 tablet by mouth every 6 hours as needed for Pain 12 tablet 0     No current facility-administered medications for this visit.         Social History     Socioeconomic History    Marital status:      Spouse name: Nakul Perry Number of children: None    Years of education: None    Highest education level: None   Occupational History    None   Social Needs    Financial resource strain: None    Food insecurity     Worry: None     Inability: None    Transportation needs     Medical: None     Non-medical: None   Tobacco Use    Smoking status: Never Smoker    Smokeless tobacco: Never Used   Substance and Sexual Activity    Alcohol use: No    Drug use: No    Sexual activity: Yes     Partners: Male   Lifestyle    Physical activity     Days per week: None     Minutes per session: None    Stress: None   Relationships    Social connections     Talks on phone: None     Gets together: None symptoms and also any potential side effects. Return for care if become worse or seek medical attention immediately. The patient is educated on symptoms of heart disease that include chest pain and passing out, dizziness, etc and to report them if there is any change or go to emergency room.        Follow up with Dr Jone Jensen as scheduled or sooner if needed  (Please note that portions of this note were completed with a voice recognition program.  Efforts were made to edit the dictation but occasionally words are mis-transcribed.)      Joe Mendez PA-C

## 2020-03-09 NOTE — PROGRESS NOTES
Pt C/O SOB, few headaches, heart palpitations, some fatigue but pt has fracture in back.        Pt denies CP, Headache, dizziness,  swelling, fatigue

## 2020-04-14 RX ORDER — PANTOPRAZOLE SODIUM 40 MG/1
40 TABLET, DELAYED RELEASE ORAL DAILY
Qty: 30 TABLET | Refills: 5 | Status: SHIPPED | OUTPATIENT
Start: 2020-04-14 | End: 2020-10-26 | Stop reason: SDUPTHER

## 2020-06-02 ENCOUNTER — VIRTUAL VISIT (OUTPATIENT)
Dept: FAMILY MEDICINE CLINIC | Age: 55
End: 2020-06-02
Payer: COMMERCIAL

## 2020-06-02 VITALS — BODY MASS INDEX: 35.82 KG/M2 | HEIGHT: 65 IN | WEIGHT: 215 LBS

## 2020-06-02 PROCEDURE — 99214 OFFICE O/P EST MOD 30 MIN: CPT | Performed by: NURSE PRACTITIONER

## 2020-06-02 RX ORDER — FEXOFENADINE HCL 180 MG/1
180 TABLET ORAL PRN
Qty: 90 TABLET | Refills: 3 | Status: SHIPPED | OUTPATIENT
Start: 2020-06-02

## 2020-06-02 RX ORDER — MELOXICAM 15 MG/1
15 TABLET ORAL DAILY
Qty: 90 TABLET | Refills: 3 | Status: SHIPPED | OUTPATIENT
Start: 2020-06-02 | End: 2020-12-02 | Stop reason: ALTCHOICE

## 2020-06-02 RX ORDER — BUSPIRONE HYDROCHLORIDE 10 MG/1
10 TABLET ORAL 2 TIMES DAILY
Qty: 180 TABLET | Refills: 3 | Status: SHIPPED | OUTPATIENT
Start: 2020-06-02 | End: 2021-05-10 | Stop reason: SDUPTHER

## 2020-06-02 RX ORDER — BACLOFEN 10 MG/1
10 TABLET ORAL 3 TIMES DAILY
Qty: 90 TABLET | Refills: 3 | Status: SHIPPED | OUTPATIENT
Start: 2020-06-02 | End: 2021-08-26 | Stop reason: SDUPTHER

## 2020-06-02 ASSESSMENT — ENCOUNTER SYMPTOMS
TROUBLE SWALLOWING: 0
DIARRHEA: 0
VOMITING: 0
SHORTNESS OF BREATH: 0
EYE ITCHING: 0
CHEST TIGHTNESS: 0
EYE PAIN: 0
SINUS PAIN: 0
BLOOD IN STOOL: 0
ABDOMINAL DISTENTION: 0
NAUSEA: 0
RHINORRHEA: 1
COUGH: 0
SORE THROAT: 0
PHOTOPHOBIA: 0
EYE DISCHARGE: 0
BACK PAIN: 1
EYE REDNESS: 0
COLOR CHANGE: 0
WHEEZING: 0
CONSTIPATION: 0
ABDOMINAL PAIN: 0
SINUS PRESSURE: 0

## 2020-06-02 ASSESSMENT — PATIENT HEALTH QUESTIONNAIRE - PHQ9
SUM OF ALL RESPONSES TO PHQ QUESTIONS 1-9: 0
SUM OF ALL RESPONSES TO PHQ QUESTIONS 1-9: 0
2. FEELING DOWN, DEPRESSED OR HOPELESS: 0
SUM OF ALL RESPONSES TO PHQ9 QUESTIONS 1 & 2: 0
1. LITTLE INTEREST OR PLEASURE IN DOING THINGS: 0

## 2020-06-02 NOTE — PROGRESS NOTES
in September.         Medications    Current Outpatient Medications:     baclofen (LIORESAL) 10 MG tablet, Take 1 tablet by mouth 3 times daily, Disp: 90 tablet, Rfl: 3    fexofenadine (ALLEGRA) 180 MG tablet, Take 1 tablet by mouth as needed (allergies), Disp: 90 tablet, Rfl: 3    meloxicam (MOBIC) 15 MG tablet, Take 1 tablet by mouth daily, Disp: 90 tablet, Rfl: 3    busPIRone (BUSPAR) 10 MG tablet, Take 1 tablet by mouth 2 times daily, Disp: 180 tablet, Rfl: 3    pantoprazole (PROTONIX) 40 MG tablet, Take 1 tablet by mouth daily, Disp: 30 tablet, Rfl: 5    HYDROcodone-acetaminophen (NORCO) 5-325 MG per tablet, Take 1 tablet by mouth every 6 hours as needed for Pain., Disp: , Rfl:     metoprolol tartrate (LOPRESSOR) 25 MG tablet, TAKE ONE TABLET BY MOUTH TWICE DAILY, Disp: 180 tablet, Rfl: 3    chlorhexidine (PERIDEX) 0.12 % solution, , Disp: , Rfl:     vitamin D (CHOLECALCIFEROL) 1000 UNIT TABS tablet, Take 1,000 Units by mouth, Disp: , Rfl:     levothyroxine (SYNTHROID) 150 MCG tablet, Take 1 tablet by mouth daily, Disp: 90 tablet, Rfl: 1    NYAMYC 186483 UNIT/GM powder, APPLY POWDER TOPICALLY THREE TIMES DAILY FOR 30 DAYS, Disp: , Rfl: 5    losartan (COZAAR) 50 MG tablet, Take 1 tablet by mouth daily, Disp: 90 tablet, Rfl: 3    triamcinolone (KENALOG) 0.1 % ointment, Apply topically 2 times daily as needed (eczema), Disp: 30 g, Rfl: 1    Ascorbic Acid (VITAMIN C) 100 MG tablet, Take 500 mg by mouth daily , Disp: , Rfl:     aspirin EC 81 MG EC tablet, Take 1 tablet by mouth daily, Disp: 30 tablet, Rfl: 3    ferrous sulfate 325 (65 FE) MG tablet, Take 325 mg by mouth daily (with breakfast), Disp: , Rfl:     calcium-vitamin D (OSCAL-500) 500-200 MG-UNIT per tablet, Take 1 tablet by mouth daily Calcium 600 mg supplement 1 tab daily, Disp: , Rfl:     ibuprofen (ADVIL;MOTRIN) 400 MG tablet, Take 1 tablet by mouth every 6 hours as needed for Pain, Disp: 12 tablet, Rfl: 0    The patient is allergic to LABMICR < 1.20 09/21/2019       Assessment/Plan      1. Essential hypertension  Stable. Refills done. Labs ordered for next visit. - Comprehensive Metabolic Panel; Future  - CBC Auto Differential; Future  - Hemoglobin A1C; Future  - Urinalysis; Future  - Microalbumin / Creatinine Urine Ratio; Future    2. Pure hyperglyceridemia  Stable. Labs ordered. - Lipid Panel; Future    3. Anxiety  Controlled on medication.   - busPIRone (BUSPAR) 10 MG tablet; Take 1 tablet by mouth 2 times daily  Dispense: 180 tablet; Refill: 3    4. Class 1 obesity due to excess calories with serious comorbidity and body mass index (BMI) of 33.0 to 33.9 in adult  Gained some weight being off work. - Hemoglobin A1C; Future    5. Chronic left-sided low back pain without sciatica  This is improving.,  Has some stress fx.   - baclofen (LIORESAL) 10 MG tablet; Take 1 tablet by mouth 3 times daily  Dispense: 90 tablet; Refill: 3    6. Seasonal allergies  Stable. - fexofenadine (ALLEGRA) 180 MG tablet; Take 1 tablet by mouth as needed (allergies)  Dispense: 90 tablet; Refill: 3    7. Osteoarthritis of pelvis  Stable. - meloxicam (MOBIC) 15 MG tablet; Take 1 tablet by mouth daily  Dispense: 90 tablet; Refill: 3    8. Sacroiliitis (HCC)  Stable. - meloxicam (MOBIC) 15 MG tablet; Take 1 tablet by mouth daily  Dispense: 90 tablet; Refill: 3    9. Vitamin D deficiency  Stable. - Vitamin D 25 Hydroxy; Future      Return in about 6 months (around 12/2/2020). Patient given educational materials - see patientinstructions. Discussed use, benefit, and side effects of prescribed medications. All patient questions answered. Pt voiced understanding. Reviewed health maintenance.

## 2020-06-23 ENCOUNTER — HOSPITAL ENCOUNTER (OUTPATIENT)
Age: 55
Discharge: HOME | End: 2020-06-23
Payer: COMMERCIAL

## 2020-06-23 LAB — TSH SERPL DL<=0.05 MIU/L-ACNC: 0.38 UIU/ML (ref 0.4–4.2)

## 2020-06-23 PROCEDURE — 84443 ASSAY THYROID STIM HORMONE: CPT

## 2020-06-23 PROCEDURE — 84432 ASSAY OF THYROGLOBULIN: CPT

## 2020-06-23 PROCEDURE — 86376 MICROSOMAL ANTIBODY EACH: CPT

## 2020-06-23 PROCEDURE — 86800 THYROGLOBULIN ANTIBODY: CPT

## 2020-06-23 PROCEDURE — 36415 COLL VENOUS BLD VENIPUNCTURE: CPT

## 2020-06-26 LAB — THYROGLOBULIN: NORMAL

## 2020-10-26 RX ORDER — PANTOPRAZOLE SODIUM 40 MG/1
40 TABLET, DELAYED RELEASE ORAL DAILY
Qty: 30 TABLET | Refills: 5 | Status: SHIPPED | OUTPATIENT
Start: 2020-10-26 | End: 2021-04-14 | Stop reason: SDUPTHER

## 2020-10-28 RX ORDER — LOSARTAN POTASSIUM 50 MG/1
50 TABLET ORAL DAILY
Qty: 90 TABLET | Refills: 3 | Status: SHIPPED | OUTPATIENT
Start: 2020-10-28 | End: 2021-05-03 | Stop reason: SDUPTHER

## 2020-10-28 NOTE — TELEPHONE ENCOUNTER
Vennie Osler called requesting a refill on the following medications:  Requested Prescriptions     Pending Prescriptions Disp Refills    losartan (COZAAR) 50 MG tablet 90 tablet 3     Sig: Take 1 tablet by mouth daily     Pharmacy verified: [facility]      Date of last visit: 6/2/2020  Date of next visit (if applicable): 36/0/1209

## 2020-11-21 ENCOUNTER — HOSPITAL ENCOUNTER (OUTPATIENT)
Age: 55
Discharge: HOME | End: 2020-11-21
Payer: COMMERCIAL

## 2020-11-21 LAB
ALBUMIN SERPL-MCNC: 4.3 G/DL (ref 3.5–5.1)
ALP BLD-CCNC: 142 U/L (ref 38–126)
ALT SERPL-CCNC: 14 U/L (ref 11–66)
AMORPHOUS: ABNORMAL
ANION GAP SERPL CALCULATED.3IONS-SCNC: 11 MEQ/L (ref 8–16)
AST SERPL-CCNC: 15 U/L (ref 5–40)
AVERAGE GLUCOSE: 105 MG/DL (ref 70–126)
BACTERIA: ABNORMAL
BASOPHILS # BLD: 0.7 %
BASOPHILS ABSOLUTE: 0 THOU/MM3 (ref 0–0.1)
BILIRUB SERPL-MCNC: 0.5 MG/DL (ref 0.3–1.2)
BILIRUBIN URINE: NEGATIVE
BLOOD, URINE: ABNORMAL
BUN BLDV-MCNC: 18 MG/DL (ref 7–22)
CALCIUM SERPL-MCNC: 10.2 MG/DL (ref 8.5–10.5)
CASTS: ABNORMAL /LPF
CASTS: ABNORMAL /LPF
CHARACTER, URINE: ABNORMAL
CHLORIDE BLD-SCNC: 104 MEQ/L (ref 98–111)
CHOLESTEROL, TOTAL: 190 MG/DL (ref 100–199)
CO2: 28 MEQ/L (ref 23–33)
COLOR: YELLOW
CREAT SERPL-MCNC: 0.7 MG/DL (ref 0.4–1.2)
CREATININE, URINE: 133.4 MG/DL
CRYSTALS: ABNORMAL
EOSINOPHIL # BLD: 1.4 %
EOSINOPHILS ABSOLUTE: 0.1 THOU/MM3 (ref 0–0.4)
EPITHELIAL CELLS, UA: ABNORMAL /HPF
ERYTHROCYTE [DISTWIDTH] IN BLOOD BY AUTOMATED COUNT: 13.2 % (ref 11.5–14.5)
ERYTHROCYTE [DISTWIDTH] IN BLOOD BY AUTOMATED COUNT: 44.4 FL (ref 35–45)
GFR SERPL CREATININE-BSD FRML MDRD: 87 ML/MIN/1.73M2
GLUCOSE BLD-MCNC: 93 MG/DL (ref 70–108)
GLUCOSE, URINE: NEGATIVE MG/DL
HBA1C MFR BLD: 5.5 % (ref 4.4–6.4)
HCT VFR BLD CALC: 44 % (ref 37–47)
HDLC SERPL-MCNC: 79 MG/DL
HEMOGLOBIN: 14.2 GM/DL (ref 12–16)
IMMATURE GRANS (ABS): 0.03 THOU/MM3 (ref 0–0.07)
IMMATURE GRANULOCYTES: 0.5 %
KETONES, URINE: NEGATIVE
LDL CHOLESTEROL CALCULATED: 103 MG/DL
LEUKOCYTE EST, POC: ABNORMAL
LYMPHOCYTES # BLD: 26.6 %
LYMPHOCYTES ABSOLUTE: 1.5 THOU/MM3 (ref 1–4.8)
MAGNESIUM: 2 MG/DL (ref 1.6–2.4)
MCH RBC QN AUTO: 29.8 PG (ref 26–33)
MCHC RBC AUTO-ENTMCNC: 32.3 GM/DL (ref 32.2–35.5)
MCV RBC AUTO: 92.4 FL (ref 81–99)
MICROALBUMIN UR-MCNC: < 1.2 MG/DL
MICROALBUMIN/CREAT UR-RTO: 9 MG/G (ref 0–30)
MISCELLANEOUS LAB TEST RESULT: ABNORMAL
MONOCYTES # BLD: 9.4 %
MONOCYTES ABSOLUTE: 0.5 THOU/MM3 (ref 0.4–1.3)
MUCUS: ABNORMAL
NITRITE, URINE: NEGATIVE
NUCLEATED RED BLOOD CELLS: 0 /100 WBC
PH UA: 5 (ref 5–9)
PLATELET # BLD: 202 THOU/MM3 (ref 130–400)
PMV BLD AUTO: 11.4 FL (ref 9.4–12.4)
POTASSIUM SERPL-SCNC: 4.1 MEQ/L (ref 3.5–5.2)
PROTEIN UA: NEGATIVE MG/DL
RBC # BLD: 4.76 MILL/MM3 (ref 4.2–5.4)
RBC URINE: ABNORMAL /HPF
RENAL EPITHELIAL, UA: ABNORMAL
SEG NEUTROPHILS: 61.4 %
SEGMENTED NEUTROPHILS ABSOLUTE COUNT: 3.4 THOU/MM3 (ref 1.8–7.7)
SODIUM BLD-SCNC: 143 MEQ/L (ref 135–145)
SPECIFIC GRAVITY UA: 1.02 (ref 1–1.03)
TOTAL PROTEIN: 7.4 G/DL (ref 6.1–8)
TRIGL SERPL-MCNC: 40 MG/DL (ref 0–199)
UROBILINOGEN, URINE: 0.2 EU/DL (ref 0–1)
VITAMIN D 25-HYDROXY: 38 NG/ML (ref 30–100)
WBC # BLD: 5.6 THOU/MM3 (ref 4.8–10.8)
WBC UA: ABNORMAL /HPF
YEAST: ABNORMAL

## 2020-11-21 PROCEDURE — 83036 HEMOGLOBIN GLYCOSYLATED A1C: CPT

## 2020-11-21 PROCEDURE — 82306 VITAMIN D 25 HYDROXY: CPT

## 2020-11-21 PROCEDURE — 85025 COMPLETE CBC W/AUTO DIFF WBC: CPT

## 2020-11-21 PROCEDURE — 82043 UR ALBUMIN QUANTITATIVE: CPT

## 2020-11-21 PROCEDURE — 80053 COMPREHEN METABOLIC PANEL: CPT

## 2020-11-21 PROCEDURE — 80061 LIPID PANEL: CPT

## 2020-11-21 PROCEDURE — 36415 COLL VENOUS BLD VENIPUNCTURE: CPT

## 2020-11-21 PROCEDURE — 81001 URINALYSIS AUTO W/SCOPE: CPT

## 2020-11-21 PROCEDURE — 83735 ASSAY OF MAGNESIUM: CPT

## 2020-12-02 ENCOUNTER — OFFICE VISIT (OUTPATIENT)
Dept: FAMILY MEDICINE CLINIC | Age: 55
End: 2020-12-02
Payer: COMMERCIAL

## 2020-12-02 VITALS
BODY MASS INDEX: 36.74 KG/M2 | WEIGHT: 220.8 LBS | DIASTOLIC BLOOD PRESSURE: 81 MMHG | OXYGEN SATURATION: 98 % | HEART RATE: 60 BPM | TEMPERATURE: 97.1 F | SYSTOLIC BLOOD PRESSURE: 129 MMHG

## 2020-12-02 PROBLEM — E04.2 NON-TOXIC MULTINODULAR GOITER: Status: ACTIVE | Noted: 2020-12-02

## 2020-12-02 PROCEDURE — 90715 TDAP VACCINE 7 YRS/> IM: CPT | Performed by: NURSE PRACTITIONER

## 2020-12-02 PROCEDURE — 99214 OFFICE O/P EST MOD 30 MIN: CPT | Performed by: NURSE PRACTITIONER

## 2020-12-02 PROCEDURE — 90471 IMMUNIZATION ADMIN: CPT | Performed by: NURSE PRACTITIONER

## 2020-12-02 PROCEDURE — 90472 IMMUNIZATION ADMIN EACH ADD: CPT | Performed by: NURSE PRACTITIONER

## 2020-12-02 PROCEDURE — 90688 IIV4 VACCINE SPLT 0.5 ML IM: CPT | Performed by: NURSE PRACTITIONER

## 2020-12-02 RX ORDER — MELOXICAM 15 MG/1
15 TABLET ORAL DAILY
Qty: 30 TABLET | Refills: 5 | Status: SHIPPED | OUTPATIENT
Start: 2020-12-02 | End: 2021-03-23 | Stop reason: SDUPTHER

## 2020-12-02 ASSESSMENT — ENCOUNTER SYMPTOMS
BLOOD IN STOOL: 0
DIARRHEA: 0
COUGH: 0
CONSTIPATION: 0
CHEST TIGHTNESS: 0
PHOTOPHOBIA: 0
ABDOMINAL PAIN: 0
SINUS PRESSURE: 0
SINUS PAIN: 0
SORE THROAT: 0
COLOR CHANGE: 0
WHEEZING: 0
EYE ITCHING: 0
EYE DISCHARGE: 0
EYE REDNESS: 0
EYE PAIN: 0
VOMITING: 0
NAUSEA: 0
ABDOMINAL DISTENTION: 0
RHINORRHEA: 0
TROUBLE SWALLOWING: 0
BACK PAIN: 0
SHORTNESS OF BREATH: 0

## 2020-12-02 NOTE — PROGRESS NOTES
After obtaining consent, and per orders of Kirit Kang FNP, injection of 0.5mL Afluria given in Right deltoid by Dean Vasquez. Patient instructed to remain in clinic for 20 minutes afterwards, and to report any adverse reaction to me immediately. After obtaining consent, and per orders of Kirit Kang FNP, injection of 0.5mL Boostrix given in Left deltoid by eDan Vasquez. Patient instructed to remain in clinic for 20 minutes afterwards, and to report any adverse reaction to me immediately. Immunizations Administered     Name Date Dose Route    Influenza, Quadv, IM, (6 mo and older Fluzone, Flulaval, Fluarix and 3 yrs and older Afluria) 12/2/2020 0.5 mL Intramuscular    Site: Deltoid- Right    Lot: [id number]    NDC: 10798-160-23    Tdap (Boostrix, Adacel) 12/2/2020 0.5 mL Intramuscular    Site: Deltoid- Left    Lot: [id number]    NDC: 73753-097-12        Vaccine Information Sheet, \"Influenza - Inactivated\"  given to Kady Ferraro, or parent/legal guardian of  Kady Ferraro and verbalized understanding. Patient responses:    Have you ever had a reaction to a flu vaccine? No  Do you have an allergy to eggs, neomycin or polymixin? No  Do you have an allergy to Thimerosal, contact lens solution, or Merthiolate? No  Have you ever had Guillian Spencer Syndrome? No  Do you have any current illness? No  Do you have a temperature above 100 degrees? No  Are you pregnant? No  If pregnant, permission obtained from physician? No  Do you have an active neurological disorder? No      Flu vaccine given per order. Please see immunization tab.

## 2020-12-02 NOTE — PROGRESS NOTES
[address] [facility],[address] [facility], [facility]. [place] [address] [facility]  Dept: [phone]  Dept Fax: : [phone]  [address] [place] [address] Fax: [phone]     Visit Date:  12/2/2020    Patient:  Kady Ferraro  YOB: 1965    HPI:     Chief Complaint   Patient presents with    Hypertension     6 month follow up    Shoulder Pain     Right- on top x couple weeks. pain better today. Taking 3 Advil- helps sometimes    Discuss Labs     11/21/2020    Orders     HM- Flu Vaccine, Tdap- going to think about getting vaccines       Has been well. Concerned about her aches and pains. Shoulder R side bothering her on top. Good today but not working.   Had labs done      Medications    Current Outpatient Medications:     triamcinolone (KENALOG) 0.1 % ointment, Apply topically 2 times daily as needed (eczema), Disp: 30 g, Rfl: 1    meloxicam (MOBIC) 15 MG tablet, Take 1 tablet by mouth daily, Disp: 30 tablet, Rfl: 5    losartan (COZAAR) 50 MG tablet, Take 1 tablet by mouth daily, Disp: 90 tablet, Rfl: 3    pantoprazole (PROTONIX) 40 MG tablet, Take 1 tablet by mouth daily, Disp: 30 tablet, Rfl: 5    baclofen (LIORESAL) 10 MG tablet, Take 1 tablet by mouth 3 times daily (Patient taking differently: Take 10 mg by mouth daily ), Disp: 90 tablet, Rfl: 3    fexofenadine (ALLEGRA) 180 MG tablet, Take 1 tablet by mouth as needed (allergies), Disp: 90 tablet, Rfl: 3    busPIRone (BUSPAR) 10 MG tablet, Take 1 tablet by mouth 2 times daily, Disp: 180 tablet, Rfl: 3    HYDROcodone-acetaminophen (NORCO) 5-325 MG per tablet, Take 1 tablet by mouth every 6 hours as needed for Pain., Disp: , Rfl:     metoprolol tartrate (LOPRESSOR) 25 MG tablet, TAKE ONE TABLET BY MOUTH TWICE DAILY, Disp: 180 tablet, Rfl: 3    vitamin D (CHOLECALCIFEROL) 1000 UNIT TABS tablet, Take 1,000 Units by mouth, Disp: , Rfl:     levothyroxine (SYNTHROID) 150 MCG tablet, Take 1 tablet by mouth daily, Disp: 90 tablet, Rfl: 1    ibuprofen (ADVIL;MOTRIN) 400 MG tablet, Take 1 tablet by mouth every 6 hours as needed for Pain, Disp: 12 tablet, Rfl: 0    Ascorbic Acid (VITAMIN C) 100 MG tablet, Take 500 mg by mouth daily , Disp: , Rfl:     aspirin EC 81 MG EC tablet, Take 1 tablet by mouth daily, Disp: 30 tablet, Rfl: 3    ferrous sulfate 325 (65 FE) MG tablet, Take 325 mg by mouth daily (with breakfast), Disp: , Rfl:     calcium-vitamin D (OSCAL-500) 500-200 MG-UNIT per tablet, Take 1 tablet by mouth daily Calcium 600 mg supplement 1 tab daily, Disp: , Rfl:     The patient is allergic to morphine; codeine; and seasonal.    Past Medical History  Henry Gray  has a past medical history of Anxiety, Arthritis, Hx of blood clots, Hypertension, Primary thyroid cancer ([facility] [address] and Pure hyperglyceridemia. Past Surgical History  The patient  has a past surgical history that includes Hysterectomy, total abdominal (3/31/2016) and Thyroid surgery. Family History  This patient's family history includes Diabetes in her maternal grandmother; Heart Disease in her brother, brother, father, and mother; High Blood Pressure in her father and mother; Osteoporosis in her mother; Stroke in her father. Social History  Henry Gray  reports that she has never smoked. She has never used smokeless tobacco. She reports that she does not drink alcohol or use drugs.     Health Maintenance:    Health Maintenance   Topic Date Due    Flu vaccine (1) 09/01/2020    DTaP/Tdap/Td vaccine (1 - Tdap) 12/02/2021 (Originally [birth year]/25/1984)    Shingles Vaccine (1 of 2) 12/02/2021 (Originally 3/25/2016)    Potassium monitoring  11/21/2021    Creatinine monitoring  11/21/2021    Breast cancer screen  02/03/2022    Lipid screen  11/21/2025    Colon cancer screen colonoscopy  12/12/2027    Hepatitis A vaccine  Aged Out    Hepatitis B vaccine  Aged Out    Hib vaccine  Aged Out    Meningococcal (ACWY) vaccine  Aged Out    Pneumococcal 0-64 years Vaccine Aged Out    HIV screen  Discontinued       Subjective:      Review of Systems   Constitutional: Positive for fatigue. Negative for activity change, appetite change, fever and unexpected weight change. HENT: Negative for congestion, dental problem, ear pain, hearing loss, mouth sores, rhinorrhea, sinus pressure, sinus pain, sore throat and trouble swallowing. Eyes: Negative for photophobia, pain, discharge, redness, itching and visual disturbance (glasses). Respiratory: Negative for cough, chest tightness, shortness of breath and wheezing. Cardiovascular: Negative for chest pain, palpitations and leg swelling. Gastrointestinal: Negative for abdominal distention, abdominal pain, blood in stool, constipation, diarrhea, nausea and vomiting. Endocrine: Negative for cold intolerance, heat intolerance, polydipsia, polyphagia and polyuria. Genitourinary: Negative for difficulty urinating, dysuria, frequency and hematuria. Musculoskeletal: Positive for arthralgias (R shoulder and L hip hurt R  knee). Negative for back pain, gait problem, joint swelling, myalgias, neck pain and neck stiffness. Skin: Negative for color change, pallor, rash and wound. Allergic/Immunologic: Negative for environmental allergies and food allergies. Neurological: Negative for dizziness, tremors, seizures, speech difficulty, weakness, light-headedness, numbness and headaches. Hematological: Negative for adenopathy. Does not bruise/bleed easily. Psychiatric/Behavioral: Negative for behavioral problems, confusion, decreased concentration and sleep disturbance. The patient is not hyperactive. Objective:     /81 (Site: Left Upper Arm, Position: Sitting, Cuff Size: Large Adult)   Pulse 60   Temp 97.1 °F (36.2 °C) (Temporal)   Wt 220 lb 12.8 oz (100.2 kg)   LMP 02/05/2016   SpO2 98%   BMI 36.74 kg/m²     Physical Exam  Vitals signs reviewed. Constitutional:       Appearance: Normal appearance.  She is Essential hypertension  VSS, renal studies wnl x blood in urine, had this before  - Comprehensive Metabolic Panel; Future  - CBC Auto Differential; Future  - Urinalysis; Future  - Microalbumin / Creatinine Urine Ratio; Future    2. Intrinsic eczema  Needs her kenalog refilled. - triamcinolone (KENALOG) 0.1 % ointment; Apply topically 2 times daily as needed (eczema)  Dispense: 30 g; Refill: 1    3. Pure hyperglyceridemia  Lipids at target, liver enzymes wnl x alk phos elevated. - Lipid Panel; Future    4. Idiopathic hematuria with minor glomerular abnormality  Large amount of blood in urine. Had this before. Sending to urology. - Kathe Pierce MD, Urology, [facility]    5. Arthritis  Developing more arthritis type aches. Will try mobic.   - meloxicam (MOBIC) 15 MG tablet; Take 1 tablet by mouth daily  Dispense: 30 tablet; Refill: 5    6. Immunization due  Getting flu and boostrix today  - INFLUENZA, QUADV, 3 YRS AND OLDER, IM, MDV, 0.5ML ([place])  - Tdap (age 6y and older) IM ([address])    7. Vitamin D deficiency  Stable on labs. - Vitamin D 25 Hydroxy; Future      Return in about 6 months (around 6/2/2021). Patient given educational materials - see patientinstructions. Discussed use, benefit, and side effects of prescribed medications. All patient questions answered. Pt voiced understanding. Reviewed health maintenance.

## 2020-12-19 ENCOUNTER — HOSPITAL ENCOUNTER (OUTPATIENT)
Age: 55
Discharge: HOME | End: 2020-12-19
Payer: COMMERCIAL

## 2020-12-19 LAB — TSH SERPL DL<=0.05 MIU/L-ACNC: 0.58 UIU/ML (ref 0.4–4.2)

## 2020-12-19 PROCEDURE — 36415 COLL VENOUS BLD VENIPUNCTURE: CPT

## 2020-12-19 PROCEDURE — 84443 ASSAY THYROID STIM HORMONE: CPT

## 2020-12-19 PROCEDURE — 86800 THYROGLOBULIN ANTIBODY: CPT

## 2020-12-22 LAB — THYROGLOBULIN: NORMAL

## 2020-12-30 ENCOUNTER — OFFICE VISIT (OUTPATIENT)
Dept: UROLOGY | Age: 55
End: 2020-12-30
Payer: COMMERCIAL

## 2020-12-30 VITALS — HEIGHT: 65 IN | WEIGHT: 225 LBS | BODY MASS INDEX: 37.49 KG/M2 | TEMPERATURE: 96.9 F

## 2020-12-30 LAB
BILIRUBIN URINE: NEGATIVE
BLOOD URINE, POC: ABNORMAL
CHARACTER, URINE: CLEAR
COLOR, URINE: YELLOW
GLUCOSE URINE: NEGATIVE MG/DL
KETONES, URINE: NEGATIVE
LEUKOCYTE CLUMPS, URINE: NEGATIVE
NITRITE, URINE: NEGATIVE
PH, URINE: 5.5 (ref 5–9)
PROTEIN, URINE: NEGATIVE MG/DL
SPECIFIC GRAVITY, URINE: <= 1.005 (ref 1–1.03)
UROBILINOGEN, URINE: 0.2 EU/DL (ref 0–1)

## 2020-12-30 PROCEDURE — 81003 URINALYSIS AUTO W/O SCOPE: CPT | Performed by: UROLOGY

## 2020-12-30 PROCEDURE — 99204 OFFICE O/P NEW MOD 45 MIN: CPT | Performed by: UROLOGY

## 2020-12-30 NOTE — PROGRESS NOTES
Dr. Dax Soria MD  [facility])  Urology Clinic  New Patient Visit      Patient:  Nicole Gross  YOB: 1965  Date: 12/30/2020    HISTORY OF PRESENT ILLNESS:   The patient is a 48 y.o. female who presents today for evaluation of the following problems: microscopic hematuria and left renal cyst. Stable. Present for 2 years. No smoking or industrial exposures. Overall the problem(s) : show no change. Associated Symptoms: No dysuria, gross hematuria. Pain Severity: 0/10    Summary of old records:   Renal US 2020 - simple left renal cyst.     Imaging/Labs reviewed during today's visit:  I have independently reviewed and verified the following films during today's visit.   Renal US 2020 - simple left renal cyst.     Urinalysis today:  Results for POC orders placed in visit on 12/30/20   POCT Urinalysis No Micro (Auto)   Result Value Ref Range    Glucose, Ur Negative NEGATIVE mg/dl    Bilirubin Urine Negative     Ketones, Urine Negative NEGATIVE    Specific Gravity, Urine <= 1.005 1.002 - 1.030    Blood, UA POC Small (A) NEGATIVE    pH, Urine 5.50 5.0 - 9.0    Protein, Urine Negative NEGATIVE mg/dl    Urobilinogen, Urine 0.20 0.0 - 1.0 eu/dl    Nitrite, Urine Negative NEGATIVE    Leukocyte Clumps, Urine Negative NEGATIVE    Color, Urine Yellow [id number]    Character, Urine Clear [id number]       Last BUN and creatinine:  Lab Results   Component Value Date    BUN 18 11/21/2020     Lab Results   Component Value Date    CREATININE 0.7 11/21/2020       Imaging Reviewed during this Office Visit:   (results were independently reviewed by physician and radiology report verified)    PAST MEDICAL, FAMILY AND SOCIAL HISTORY:  Past Medical History:   Diagnosis Date    Anxiety     Arthritis     Hx of blood clots     Hypertension     Primary thyroid cancer ([facility] [address] 10/16/2019    Pure hyperglyceridemia 8/17/2018     Past Surgical History:   Procedure Laterality Date  HYSTERECTOMY, TOTAL ABDOMINAL  3/31/2016    THYROID SURGERY       Family History   Problem Relation Age of Onset    High Blood Pressure Mother     Heart Disease Mother     Osteoporosis Mother     Stroke Father     High Blood Pressure Father     Heart Disease Father     Heart Disease Brother     Heart Disease Brother     Diabetes Maternal Grandmother      Outpatient Medications Marked as Taking for the 12/30/20 encounter (Office Visit) with Celine Sue MD   Medication Sig Dispense Refill    triamcinolone (KENALOG) 0.1 % ointment Apply topically 2 times daily as needed (eczema) 30 g 1    meloxicam (MOBIC) 15 MG tablet Take 1 tablet by mouth daily 30 tablet 5    losartan (COZAAR) 50 MG tablet Take 1 tablet by mouth daily 90 tablet 3    pantoprazole (PROTONIX) 40 MG tablet Take 1 tablet by mouth daily 30 tablet 5    baclofen (LIORESAL) 10 MG tablet Take 1 tablet by mouth 3 times daily (Patient taking differently: Take 10 mg by mouth daily ) 90 tablet 3    fexofenadine (ALLEGRA) 180 MG tablet Take 1 tablet by mouth as needed (allergies) 90 tablet 3    busPIRone (BUSPAR) 10 MG tablet Take 1 tablet by mouth 2 times daily 180 tablet 3    metoprolol tartrate (LOPRESSOR) 25 MG tablet TAKE ONE TABLET BY MOUTH TWICE DAILY 180 tablet 3    vitamin D (CHOLECALCIFEROL) 1000 UNIT TABS tablet Take 1,000 Units by mouth      levothyroxine (SYNTHROID) 150 MCG tablet Take 1 tablet by mouth daily 90 tablet 1    Ascorbic Acid (VITAMIN C) 100 MG tablet Take 500 mg by mouth daily       aspirin EC 81 MG EC tablet Take 1 tablet by mouth daily 30 tablet 3    ferrous sulfate 325 (65 FE) MG tablet Take 325 mg by mouth daily (with breakfast)      calcium-vitamin D (OSCAL-500) 500-200 MG-UNIT per tablet Take 1 tablet by mouth daily Calcium 600 mg supplement 1 tab daily         Morphine, Codeine, and Seasonal  Social History     Tobacco Use   Smoking Status Never Smoker   Smokeless Tobacco Never Used Social History     Substance and Sexual Activity   Alcohol Use No       REVIEW OF SYSTEMS:  Constitutional: negative  Eyes: negative  Respiratory: negative  Cardiovascular: negative  Gastrointestinal: negative  Genitourinary: negative except for what is in HPI  Musculoskeletal: negative  Skin: negative   Neurological: negative  Hematological/Lymphatic: negative  Psychological: negative    Physical Exam:    This a 48 y.o. female      Vitals:    12/30/20 0834   Temp: 96.9 °F (36.1 °C)     Constitutional: Patient in no acute distress. Neuro: Alert and oriented to person, place and time. Psych: mood and affect normal  HEENT negative  Lungs: Respiratory effort is normal  Cardiovascular: Normal peripheral pulses  Abdomen: Soft, non-tender, non-distended with no CVA, flank pain or hepatosplenomegaly. No hernias. Kidneys normal.  Lymphatics: No palpable lymphadenopathy. Bladder non-tender and not distended. Assessment and Plan      1. Hematuria, unspecified type    2. Renal cyst           Plan:      No follow-ups on file. CT urogram and local cysto in the office.           Dr. Lisa Raya MD

## 2021-01-06 ENCOUNTER — HOSPITAL ENCOUNTER (OUTPATIENT)
Dept: CT IMAGING | Age: 56
Discharge: HOME | End: 2021-01-06
Payer: COMMERCIAL

## 2021-01-06 DIAGNOSIS — R31.9 HEMATURIA, UNSPECIFIED TYPE: ICD-10-CM

## 2021-01-06 PROCEDURE — 6360000004 HC RX CONTRAST MEDICATION: Performed by: UROLOGY

## 2021-01-06 PROCEDURE — 74178 CT ABD&PLV WO CNTR FLWD CNTR: CPT

## 2021-01-06 RX ADMIN — IOPAMIDOL 85 ML: 755 INJECTION, SOLUTION INTRAVENOUS at 10:23

## 2021-02-03 ENCOUNTER — PROCEDURE VISIT (OUTPATIENT)
Dept: UROLOGY | Age: 56
End: 2021-02-03
Payer: COMMERCIAL

## 2021-02-03 ENCOUNTER — HOSPITAL ENCOUNTER (OUTPATIENT)
Dept: WOMENS IMAGING | Age: 56
Discharge: HOME | End: 2021-02-03
Payer: COMMERCIAL

## 2021-02-03 VITALS — TEMPERATURE: 96.7 F | BODY MASS INDEX: 36.97 KG/M2 | HEIGHT: 65 IN | WEIGHT: 221.9 LBS

## 2021-02-03 DIAGNOSIS — Z12.31 VISIT FOR SCREENING MAMMOGRAM: ICD-10-CM

## 2021-02-03 DIAGNOSIS — R31.9 HEMATURIA, UNSPECIFIED TYPE: Primary | ICD-10-CM

## 2021-02-03 LAB
BILIRUBIN URINE: NEGATIVE
BLOOD URINE, POC: ABNORMAL
CHARACTER, URINE: CLEAR
COLOR, URINE: YELLOW
GLUCOSE URINE: NEGATIVE MG/DL
KETONES, URINE: NEGATIVE
LEUKOCYTE CLUMPS, URINE: NEGATIVE
NITRITE, URINE: NEGATIVE
PH, URINE: 7 (ref 5–9)
PROTEIN, URINE: NEGATIVE MG/DL
SPECIFIC GRAVITY, URINE: 1.02 (ref 1–1.03)
UROBILINOGEN, URINE: 0.2 EU/DL (ref 0–1)

## 2021-02-03 PROCEDURE — 52000 CYSTOURETHROSCOPY: CPT | Performed by: UROLOGY

## 2021-02-03 PROCEDURE — 77063 BREAST TOMOSYNTHESIS BI: CPT

## 2021-02-03 PROCEDURE — 81003 URINALYSIS AUTO W/O SCOPE: CPT | Performed by: UROLOGY

## 2021-02-03 NOTE — PROGRESS NOTES
Dr. Dalia Howell MD  Urologic Surgery        [address] [place] [address]. [place]  02/03/21    Patient:  Lisandro Gramajo  MRN: [id number]  YOB: 1965    Surgeon: Dr. Dalia Howell MD  Assistant: None    Pre-op Diagnosis: Hematuria. Negative 2021 CT urogram  Post-op Diagnosis: Same    Procedure:   Cystoscopy. Anesthesia: Local  Complications: None  OR Blood Loss: Minimal  Fluids: Cystalloids  Specimens: None    Indication:  Patient is a 67-year-old female with a history of hematuria. She underwent CT urogram which was unremarkable except for left-sided parapelvic renal cysts. Small 2 mm right nonobstructing stone. She presents today for cystoscopy. Narrative of the Procedure:    After informed consent was obtained in the preoperative area, the patient was taken back to the operating room and remained on the hospital gurney. The patient was prepped and draped in a sterile manner. A time out occurred in which two patient identifiers were used. The flexible scope was carefully placed into the bladder. Findings:  Urethra: Normal.  Bladder Neck: Normal.  Papillary lesions: None. Trabeculations: Mild. Cellules/Diverticula: None. Bladder Stones: None. Ureteral Orifices: Normal position with clear reflux. OVERALL IMPRESSION: Normal cystoscopy today. Follow-Up: Follow-up in 6 months with micro UA.     Dalia Howell  Electronically signed on 2/3/2021 at 1:19 PM

## 2021-03-12 ENCOUNTER — TELEPHONE (OUTPATIENT)
Dept: FAMILY MEDICINE CLINIC | Age: 56
End: 2021-03-12

## 2021-03-12 NOTE — TELEPHONE ENCOUNTER
This is a Wilber Large patient, has an appt with Clara Burns in June. Her insurance changed and they require her to get prescriptions from [address]  Also, they require 90 day refills  - Pantoprazole 40 mg  - Meloxicam 15 mg  - Losartan 50 mg  - Kenalog cream

## 2021-03-12 NOTE — TELEPHONE ENCOUNTER
I spoke to the patient and notified her she has to be seen before any refills can be sent. Patient verbalized understanding. Patient requested 03/23/2021. Made an appointment for 3:45pm that day.

## 2021-03-15 ENCOUNTER — OFFICE VISIT (OUTPATIENT)
Dept: CARDIOLOGY CLINIC | Age: 56
End: 2021-03-15
Payer: COMMERCIAL

## 2021-03-15 VITALS
HEART RATE: 63 BPM | DIASTOLIC BLOOD PRESSURE: 64 MMHG | HEIGHT: 65 IN | SYSTOLIC BLOOD PRESSURE: 114 MMHG | WEIGHT: 220 LBS | BODY MASS INDEX: 36.65 KG/M2

## 2021-03-15 DIAGNOSIS — R94.39 ABNORMAL STRESS ECG: ICD-10-CM

## 2021-03-15 DIAGNOSIS — Z82.49 FAMILY HISTORY OF PREMATURE CAD: ICD-10-CM

## 2021-03-15 DIAGNOSIS — R00.2 INTERMITTENT PALPITATIONS: ICD-10-CM

## 2021-03-15 DIAGNOSIS — I10 ESSENTIAL HYPERTENSION: ICD-10-CM

## 2021-03-15 DIAGNOSIS — R06.02 SOB (SHORTNESS OF BREATH) ON EXERTION: Primary | ICD-10-CM

## 2021-03-15 PROCEDURE — 93000 ELECTROCARDIOGRAM COMPLETE: CPT | Performed by: INTERNAL MEDICINE

## 2021-03-15 PROCEDURE — 99204 OFFICE O/P NEW MOD 45 MIN: CPT | Performed by: INTERNAL MEDICINE

## 2021-03-15 NOTE — PROGRESS NOTES
Chief Complaint   Patient presents with    Check-Up    Palpitations    Shortness of Breath   originally  patient  Here for abnormal EKG and cp    Last seen 03/11/2019- over 2 yrs    Came back after lost to f/u    Occasional palpitations    Sob on exertion  Worse than before - mainly on stairs        EKG done today  Denied Chest pain,  Dizziness, edema   No N/v/d      FHX  Dad's first heart attck was when 62years old. One brother was 62 when he had heart attack and passed away and   her other brother was 54 when he had his heart attack and he is still living.   Nohemi Prabhakar had CABG at age 55        nevere smoked      Patient Active Problem List   Diagnosis    Anxiety    Fibroid, uterine    Intermittent palpitations    Family history of premature CAD-father and two brother had mi at age 62   Sheree Monroy Abnormal stress ECG-st depression of 0.6    Essential hypertension    Seasonal allergies    Osteopenia after menopause    Pure hyperglyceridemia    Class 1 obesity due to excess calories with serious comorbidity and body mass index (BMI) of 33.0 to 33.9 in adult    Chest pain    [facility] thyroid cancer ([facility] [address]    Papillary thyroid carcinoma (HCC)    Non-toxic multinodular goiter    SOB (shortness of breath) on exertion       Past Surgical History:   Procedure Laterality Date    HYSTERECTOMY, TOTAL ABDOMINAL  3/31/2016    THYROID SURGERY         Allergies   Allergen Reactions    Morphine Other (See Comments)     \"not sure of reaction\"    Codeine     Seasonal         Family History   Problem Relation Age of Onset    High Blood Pressure Mother     Heart Disease Mother     Osteoporosis Mother     Stroke Father     High Blood Pressure Father     Heart Disease Father     Heart Disease Brother     Heart Disease Brother     Diabetes Maternal Grandmother         Social History     Socioeconomic History    Marital status:      Spouse name: Joe Dinero Number of children: Not on file    Years of education: Not on file    Highest education level: Not on file   Occupational History    Not on file   Social Needs    Financial resource strain: Not on file    Food insecurity     Worry: Not on file     Inability: Not on file    Transportation needs     Medical: Not on file     Non-medical: Not on file   Tobacco Use    Smoking status: Never Smoker    Smokeless tobacco: Never Used   Substance and Sexual Activity    Alcohol use: No    Drug use: No    Sexual activity: Yes     Partners: Male   Lifestyle    Physical activity     Days per week: Not on file     Minutes per session: Not on file    Stress: Not on file   Relationships    Social connections     Talks on phone: Not on file     Gets together: Not on file     Attends [employer] service: Not on file     Active member of club or organization: Not on file     Attends meetings of clubs or organizations: Not on file     Relationship status: Not on file    Intimate partner violence     Fear of current or ex partner: Not on file     Emotionally abused: Not on file     Physically abused: Not on file     Forced sexual activity: Not on file   Other Topics Concern    Not on file   Social History Narrative    Not on file       Current Outpatient Medications   Medication Sig Dispense Refill    metoprolol tartrate (LOPRESSOR) 25 MG tablet TAKE ONE TABLET BY MOUTH TWICE DAILY 180 tablet 3    triamcinolone (KENALOG) 0.1 % ointment Apply topically 2 times daily as needed (eczema) 30 g 1    meloxicam (MOBIC) 15 MG tablet Take 1 tablet by mouth daily 30 tablet 5    losartan (COZAAR) 50 MG tablet Take 1 tablet by mouth daily 90 tablet 3    pantoprazole (PROTONIX) 40 MG tablet Take 1 tablet by mouth daily 30 tablet 5    baclofen (LIORESAL) 10 MG tablet Take 1 tablet by mouth 3 times daily (Patient taking differently: Take 10 mg by mouth daily ) 90 tablet 3    fexofenadine (ALLEGRA) 180 MG tablet Take 1 tablet by mouth as needed (allergies) 90 tablet 3    busPIRone (BUSPAR) 10 MG tablet Take 1 tablet by mouth 2 times daily 180 tablet 3    vitamin D (CHOLECALCIFEROL) 1000 UNIT TABS tablet Take 1,000 Units by mouth      levothyroxine (SYNTHROID) 150 MCG tablet Take 1 tablet by mouth daily 90 tablet 1    Ascorbic Acid (VITAMIN C) 100 MG tablet Take 500 mg by mouth daily       aspirin EC 81 MG EC tablet Take 1 tablet by mouth daily 30 tablet 3    ferrous sulfate 325 (65 FE) MG tablet Take 325 mg by mouth daily (with breakfast)      calcium-vitamin D (OSCAL-500) 500-200 MG-UNIT per tablet Take 1 tablet by mouth daily Calcium 600 mg supplement 1 tab daily      ibuprofen (ADVIL;MOTRIN) 400 MG tablet Take 1 tablet by mouth every 6 hours as needed for Pain (Patient not taking: Reported on 2/3/2021) 12 tablet 0     No current facility-administered medications for this visit. Review of Systems -     General ROS: negative  Psychological ROS: negative  Hematological and Lymphatic ROS: No history of blood clots or bleeding disorder. Respiratory ROS: no cough, shortness of breath, or wheezing  Cardiovascular ROS: no chest pain or dyspnea on exertion  Gastrointestinal ROS: negative  Genito-Urinary ROS: no dysuria, trouble voiding, or hematuria  Musculoskeletal ROS: negative  Neurological ROS: no TIA or stroke symptoms  Dermatological ROS: negative      Blood pressure 114/64, pulse 63, height 5' 5\" (1.651 m), weight 220 lb (99.8 kg), last menstrual period 02/05/2016.         Physical Examination:    General appearance - alert, well appearing, and in no distress  Mental status - alert, oriented to person, place, and time  Neck - supple, no significant adenopathy, no JVD, or carotid bruits  Chest - clear to auscultation, no wheezes, rales or rhonchi, symmetric air entry  Heart - normal rate, regular rhythm, normal S1, S2, no murmurs, rubs, clicks or gallops  Abdomen - soft, nontender, nondistended, no masses or organomegaly  Neurological - alert, oriented, normal speech, no focal findings or movement disorder noted  Musculoskeletal - no joint tenderness, deformity or swelling  Extremities - peripheral pulses normal, no pedal edema, no clubbing or cyanosis  Skin - normal coloration and turgor, no rashes, no suspicious skin lesions noted    Lab  No results for input(s): CKTOTAL, CKMB, CKMBINDEX, TROPONINI in the last 72 hours. CBC:   Lab Results   Component Value Date    WBC 5.6 11/21/2020    RBC 4.76 11/21/2020    HGB 14.2 11/21/2020    HCT 44.0 11/21/2020    MCV 92.4 11/21/2020    MCH 29.8 11/21/2020    MCHC 32.3 11/21/2020    RDW 13.1 02/03/2018     11/21/2020    MPV 11.4 11/21/2020     BMP:    Lab Results   Component Value Date     11/21/2020    K 4.1 11/21/2020    K 4.3 07/23/2019     11/21/2020    CO2 28 11/21/2020    BUN 18 11/21/2020    LABALBU 4.3 11/21/2020    CREATININE 0.7 11/21/2020    CALCIUM 10.2 11/21/2020    LABGLOM 87 11/21/2020    GLUCOSE 93 11/21/2020     Hepatic Function Panel:    Lab Results   Component Value Date    ALKPHOS 142 11/21/2020    ALT 14 11/21/2020    AST 15 11/21/2020    PROT 7.4 11/21/2020    BILITOT 0.5 11/21/2020    BILIDIR 0.1 02/14/2013    LABALBU 4.3 11/21/2020     Magnesium:    Lab Results   Component Value Date    MG 2.0 11/21/2020     Warfarin PT/INR:  No components found for: PTPATWAR, PTINRWAR  HgBA1c:    Lab Results   Component Value Date    LABA1C 5.5 11/21/2020     FLP:    Lab Results   Component Value Date    TRIG 40 11/21/2020    HDL 79 11/21/2020    LDLCALC 103 11/21/2020     TSH:    Lab Results   Component Value Date    TSH 0.585 12/19/2020     Conclusions      Summary   The EKG part of the lexiscan stress test is equivocal/Borderline for   ischemia with 0.6 mm ST depression in the inferior and lateral leads(   II,III, AvF, V5 and V6).    Calculated gated LVEF 68 %.   The T.I.D. ratio was 1.2   There is moderate attenuation artifact noted in the inferior wall seems to   be related to bowel artifact.  Janett Files was no evidence of definite reversible tracer uptake.   The nuclear images is not suggestive for myocardial ischemia.      Recommendation   Clinical correlation is recommended in view of borderline EKG change.      Signatures      ----------------------------------------------------------------   Electronically signed by Juanita Calvillo MD (Interpreting   Cardiologist) on 03/08/2017 at 20:20    Echo EF WNL      CONCLUSION: This is a normal sinus rhythm, average heart rate 77 beats per  minute, ranging from  beats per minute. No significant pause of more  than 1.2 seconds noted. Rare ventricular ectopic beats, total of 9, all  isolated. Rare supraventricular ectopic beats, a total of 80, isolated and  supraventricular couplet.     Occasional supraventricular ectopic run, in the triplet and quadruplet pattern  and there are a few triplet and few, 1 quadruplet, the fastest and the longest  composed of 4 beats, rate 132 beats per minute.     There is a correlation most of the time with a palpitation. The patient  stating and is the supraventricular ectopic run particularly with triplets, on  2 times, the patient was complaining of palpitation and the patient was  triplets of supraventricular ectopic beats.     So need further recommendation basically consider beta blocker for suppression  of supraventricular ectopic beats.         Sarah Bautista M.D.        D: 04/04/2017    EKG 3/5/18  NSR, no acute abn     EKG 3/11/19  NSR  NO acute abn    ekg 3/15/2021  Sinus  Rhythm   WITHIN NORMAL LIMITS    Assessment     Diagnosis Orders   1. SOB (shortness of breath) on exertion  EKG 12 lead    ECHO Complete 2D W Doppler W Color    NM MYOCARDIAL SPECT REST EXERCISE OR RX   2. Intermittent palpitations  EKG 12 lead    ECHO Complete 2D W Doppler W Color    NM MYOCARDIAL SPECT REST EXERCISE OR RX   3. Essential hypertension  EKG 12 lead   4. Boderline Abnormal stress ECG-st depression of 0.6     5.  Family history of premature CAD-father and two brother had mi at age 62  ECHO Complete 2D W Doppler W Color    NM MYOCARDIAL SPECT REST EXERCISE OR RX       Plan   The current meds and labs and records reviewed    Continue the current treatment and with constant vigilance to changes in symptoms and also any potential side effects. Return for care or seek medical attention immediately if symptoms got worse and/or develop new symptoms. Hypertension, on medical treatment. Seems to be under good control. Patient is compliant with medical treatment. Sob on exertion worse per pat  Fhx of CAD  Echo  Ex nuc      Palpitation got better with lopressor but still there  Borderline abn ekg stress  Palpitation-better after lopressor   holter 48 -triplet felt as palpitation  Cont  lopressor 25 PO BID  cont asa 81    On anxiety medication  Its dose was increased    Gained wt 10 lb in 2 yrs    Non -complaince advised        Discussed use, benefit, and side effects of prescribed medications. All patient questions answered. Pt voiced understanding. Instructed to continue current medications, diet and exercise. Continue risk factor modification and medical management. Patient agreed with treatment plan. Follow up as directed.       RTC 6 months    [facility]

## 2021-03-16 ENCOUNTER — TELEPHONE (OUTPATIENT)
Dept: CARDIOLOGY CLINIC | Age: 56
End: 2021-03-16

## 2021-03-22 ENCOUNTER — HOSPITAL ENCOUNTER (OUTPATIENT)
Age: 56
Discharge: HOME | End: 2021-03-22
Payer: COMMERCIAL

## 2021-03-22 LAB — TSH SERPL DL<=0.05 MIU/L-ACNC: 0.28 UIU/ML (ref 0.4–4.2)

## 2021-03-22 PROCEDURE — 36415 COLL VENOUS BLD VENIPUNCTURE: CPT

## 2021-03-22 PROCEDURE — 84443 ASSAY THYROID STIM HORMONE: CPT

## 2021-03-22 PROCEDURE — 86800 THYROGLOBULIN ANTIBODY: CPT

## 2021-03-23 ENCOUNTER — OFFICE VISIT (OUTPATIENT)
Dept: FAMILY MEDICINE CLINIC | Age: 56
End: 2021-03-23
Payer: COMMERCIAL

## 2021-03-23 VITALS
BODY MASS INDEX: 35.81 KG/M2 | SYSTOLIC BLOOD PRESSURE: 131 MMHG | TEMPERATURE: 97.1 F | WEIGHT: 215.2 LBS | DIASTOLIC BLOOD PRESSURE: 77 MMHG | OXYGEN SATURATION: 98 % | HEART RATE: 66 BPM

## 2021-03-23 DIAGNOSIS — M19.90 ARTHRITIS: ICD-10-CM

## 2021-03-23 DIAGNOSIS — R06.02 SOB (SHORTNESS OF BREATH): ICD-10-CM

## 2021-03-23 DIAGNOSIS — J30.2 SEASONAL ALLERGIES: ICD-10-CM

## 2021-03-23 DIAGNOSIS — M25.511 CHRONIC RIGHT SHOULDER PAIN: ICD-10-CM

## 2021-03-23 DIAGNOSIS — F41.9 ANXIETY: ICD-10-CM

## 2021-03-23 DIAGNOSIS — E55.9 VITAMIN D DEFICIENCY: ICD-10-CM

## 2021-03-23 DIAGNOSIS — G89.29 CHRONIC RIGHT SHOULDER PAIN: ICD-10-CM

## 2021-03-23 DIAGNOSIS — E78.1 PURE HYPERGLYCERIDEMIA: ICD-10-CM

## 2021-03-23 DIAGNOSIS — L20.84 INTRINSIC ECZEMA: ICD-10-CM

## 2021-03-23 DIAGNOSIS — R93.5 ABNORMAL CT OF THE ABDOMEN: ICD-10-CM

## 2021-03-23 DIAGNOSIS — M16.10 OSTEOARTHRITIS OF PELVIS: ICD-10-CM

## 2021-03-23 DIAGNOSIS — H91.92 HEARING LOSS OF LEFT EAR, UNSPECIFIED HEARING LOSS TYPE: ICD-10-CM

## 2021-03-23 DIAGNOSIS — I10 ESSENTIAL HYPERTENSION: Primary | ICD-10-CM

## 2021-03-23 PROCEDURE — 99214 OFFICE O/P EST MOD 30 MIN: CPT | Performed by: NURSE PRACTITIONER

## 2021-03-23 RX ORDER — PHENOL 1.4 %
600 AEROSOL, SPRAY (ML) MUCOUS MEMBRANE DAILY
Qty: 30 TABLET | Refills: 3 | Status: CANCELLED | OUTPATIENT
Start: 2021-03-23

## 2021-03-23 RX ORDER — MELOXICAM 15 MG/1
15 TABLET ORAL DAILY
Qty: 90 TABLET | Refills: 3 | Status: SHIPPED | OUTPATIENT
Start: 2021-03-23 | End: 2022-02-15

## 2021-03-23 ASSESSMENT — ENCOUNTER SYMPTOMS
DIARRHEA: 0
VOMITING: 0
EYE DISCHARGE: 0
SHORTNESS OF BREATH: 1
CHEST TIGHTNESS: 0
CONSTIPATION: 0
COUGH: 0
ABDOMINAL PAIN: 0
RHINORRHEA: 0

## 2021-03-23 NOTE — PATIENT INSTRUCTIONS
Patient Education        Shoulder Arthritis: Exercises  Introduction  Here are some examples of exercises for you to try. The exercises may be suggested for a condition or for rehabilitation. Start each exercise slowly. Ease off the exercises if you start to have pain. You will be told when to start these exercises and which ones will work best for you. How to do the exercises  Shoulder flexion (lying down)   To make a wand for this exercise, use a piece of PVC pipe or a broom handle with the broom removed. Make the wand about a foot wider than your shoulders. 1. Lie on your back, holding a wand with both hands. Your palms should face down as you hold the wand. 2. Keeping your elbows straight, slowly raise your arms over your head. Raise them until you feel a stretch in your shoulders, upper back, and chest.  3. Hold for 15 to 30 seconds. 4. Repeat 2 to 4 times. Shoulder rotation (lying down)   To make a wand for this exercise, use a piece of PVC pipe or a broom handle with the broom removed. Make the wand about a foot wider than your shoulders. 1. Lie on your back. Hold a wand with both hands with your elbows bent and palms up. 2. Keep your elbows close to your body, and move the wand across your body toward the sore arm. 3. Hold for 8 to 12 seconds. 4. Repeat 2 to 4 times. Shoulder internal rotation with towel   1. Hold a towel above and behind your head with the arm that is not sore. 2. With your sore arm, reach behind your back and grasp the towel. 3. With the arm above your head, pull the towel upward. Do this until you feel a stretch on the front and outside of your sore shoulder. 4. Hold 15 to 30 seconds. 5. Repeat 2 to 4 times. Shoulder blade squeeze   1. Stand with your arms at your sides, and squeeze your shoulder blades together. Do not raise your shoulders up as you squeeze. 2. Hold 6 seconds. 3. Repeat 8 to 12 times.     Resisted rows   For this exercise, you will need elastic exercise material, such as surgical tubing or Thera-Band. 1. Put the band around a solid object at about waist level. (A bedpost will work well.) Each hand should hold an end of the band. 2. With your elbows at your sides and bent to 90 degrees, pull the band back. Your shoulder blades should move toward each other. Return to the starting position. 3. Repeat 8 to 12 times. External rotator strengthening exercise   1. Start by tying a piece of elastic exercise material to a doorknob. You can use surgical tubing or Thera-Band. (You may also hold one end of the band in each hand.)  2. Stand or sit with your shoulder relaxed and your elbow bent 90 degrees. Your upper arm should rest comfortably against your side. Squeeze a rolled towel between your elbow and your body for comfort. This will help keep your arm at your side. 3. Hold one end of the elastic band with the hand of the painful arm. 4. Start with your forearm across your belly. Slowly rotate the forearm out away from your body. Keep your elbow and upper arm tucked against the towel roll or the side of your body until you begin to feel tightness in your shoulder. Slowly move your arm back to where you started. 5. Repeat 8 to 12 times. Internal rotator strengthening exercise   1. Start by tying a piece of elastic exercise material to a doorknob. You can use surgical tubing or Thera-Band. 2. Stand or sit with your shoulder relaxed and your elbow bent 90 degrees. Your upper arm should rest comfortably against your side. Squeeze a rolled towel between your elbow and your body for comfort. This will help keep your arm at your side. 3. Hold one end of the elastic band in the hand of the painful arm. 4. Slowly rotate your forearm toward your body until it touches your belly. Slowly move it back to where you started. 5. Keep your elbow and upper arm firmly tucked against the towel roll or at your side. 6. Repeat 8 to 12 times.     Pendulum swing   If you have pain in your back, do not do this exercise. 1. Hold on to a table or the back of a chair with your good arm. Then bend forward a little and let your sore arm hang straight down. This exercise does not use the arm muscles. Rather, use your legs and your hips to create movement that makes your arm swing freely. 2. Use the movement from your hips and legs to guide the slightly swinging arm back and forth like a pendulum (or elephant trunk). Then guide it in circles that start small (about the size of a dinner plate). Make the circles a bit larger each day, as your pain allows. 3. Do this exercise for 5 minutes, 5 to 7 times each day. 4. As you have less pain, try bending over a little farther to do this exercise. This will increase the amount of movement at your shoulder. Follow-up care is a key part of your treatment and safety. Be sure to make and go to all appointments, and call your doctor if you are having problems. It's also a good idea to know your test results and keep a list of the medicines you take. Where can you learn more? Go to https://[employer]. org and sign in to your [employer] account. Enter H562 in the [employer] box to learn more about \"Shoulder Arthritis: Exercises. \"     If you do not have an account, please click on the \"Sign Up Now\" link. Current as of: November 16, 2020               Content Version: 12.8  © 9827-0688 [employer]. Care instructions adapted under license by [facility] ([facility]). If you have questions about a medical condition or this instruction, always ask your healthcare professional. [place] [address] any warranty or liability for your use of this information.
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
## 2021-03-24 PROBLEM — H91.92 HEARING LOSS OF LEFT EAR: Status: ACTIVE | Noted: 2021-03-24

## 2021-03-24 PROBLEM — M16.10 OSTEOARTHRITIS OF PELVIS: Status: ACTIVE | Noted: 2021-03-24

## 2021-03-24 PROBLEM — E55.9 VITAMIN D DEFICIENCY: Status: ACTIVE | Noted: 2021-03-24

## 2021-03-24 PROBLEM — M25.511 CHRONIC RIGHT SHOULDER PAIN: Status: ACTIVE | Noted: 2021-03-24

## 2021-03-24 PROBLEM — G89.29 CHRONIC RIGHT SHOULDER PAIN: Status: ACTIVE | Noted: 2021-03-24

## 2021-03-31 ENCOUNTER — ANCILLARY PROCEDURE (OUTPATIENT)
Dept: FAMILY MEDICINE CLINIC | Age: 56
End: 2021-03-31
Payer: COMMERCIAL

## 2021-03-31 DIAGNOSIS — G89.29 CHRONIC RIGHT SHOULDER PAIN: ICD-10-CM

## 2021-03-31 DIAGNOSIS — R06.02 SOB (SHORTNESS OF BREATH): ICD-10-CM

## 2021-03-31 DIAGNOSIS — M25.511 CHRONIC RIGHT SHOULDER PAIN: ICD-10-CM

## 2021-03-31 DIAGNOSIS — R93.5 ABNORMAL CT OF THE ABDOMEN: ICD-10-CM

## 2021-03-31 PROCEDURE — 71046 X-RAY EXAM CHEST 2 VIEWS: CPT

## 2021-03-31 PROCEDURE — 73030 X-RAY EXAM OF SHOULDER: CPT

## 2021-03-31 PROCEDURE — 71046 X-RAY EXAM CHEST 2 VIEWS: CPT | Performed by: NURSE PRACTITIONER

## 2021-03-31 PROCEDURE — 73030 X-RAY EXAM OF SHOULDER: CPT | Performed by: NURSE PRACTITIONER

## 2021-04-01 ENCOUNTER — HOSPITAL ENCOUNTER (OUTPATIENT)
Dept: NON INVASIVE DIAGNOSTICS | Age: 56
Discharge: HOME | End: 2021-04-01
Payer: COMMERCIAL

## 2021-04-01 DIAGNOSIS — R00.2 INTERMITTENT PALPITATIONS: ICD-10-CM

## 2021-04-01 DIAGNOSIS — Z82.49 FAMILY HISTORY OF PREMATURE CAD: ICD-10-CM

## 2021-04-01 DIAGNOSIS — R06.02 SOB (SHORTNESS OF BREATH) ON EXERTION: ICD-10-CM

## 2021-04-01 LAB
LV EF: 56 %
LV EF: 65 %
LVEF MODALITY: NORMAL
LVEF MODALITY: NORMAL

## 2021-04-01 PROCEDURE — 93306 TTE W/DOPPLER COMPLETE: CPT

## 2021-04-01 PROCEDURE — 93017 CV STRESS TEST TRACING ONLY: CPT | Performed by: INTERNAL MEDICINE

## 2021-04-01 PROCEDURE — A9500 TC99M SESTAMIBI: HCPCS | Performed by: INTERNAL MEDICINE

## 2021-04-01 PROCEDURE — 3430000000 HC RX DIAGNOSTIC RADIOPHARMACEUTICAL: Performed by: INTERNAL MEDICINE

## 2021-04-01 PROCEDURE — 78452 HT MUSCLE IMAGE SPECT MULT: CPT

## 2021-04-01 RX ADMIN — Medication 31.6 MILLICURIE: at 09:16

## 2021-04-01 RX ADMIN — Medication 9.2 MILLICURIE: at 08:00

## 2021-04-06 ENCOUNTER — HOSPITAL ENCOUNTER (OUTPATIENT)
Age: 56
Discharge: HOME | End: 2021-04-06
Payer: COMMERCIAL

## 2021-04-06 PROCEDURE — 36415 COLL VENOUS BLD VENIPUNCTURE: CPT

## 2021-04-06 PROCEDURE — 86800 THYROGLOBULIN ANTIBODY: CPT

## 2021-04-07 LAB — THYROGLOBULIN: NORMAL

## 2021-04-14 RX ORDER — PANTOPRAZOLE SODIUM 40 MG/1
40 TABLET, DELAYED RELEASE ORAL DAILY
Qty: 30 TABLET | Refills: 5 | Status: SHIPPED | OUTPATIENT
Start: 2021-04-14 | End: 2021-04-15 | Stop reason: SDUPTHER

## 2021-04-14 NOTE — TELEPHONE ENCOUNTER
Karina Hardy called requesting a refill on the following medications:  Requested Prescriptions     Pending Prescriptions Disp Refills    pantoprazole (PROTONIX) 40 MG tablet 30 tablet 5     Sig: Take 1 tablet by mouth daily     Pharmacy verified: [facility]   . pv      Date of last visit: 3/23/2021  Date of next visit (if applicable): 9/45/9620      Pt needs 90 day supply

## 2021-04-15 RX ORDER — PANTOPRAZOLE SODIUM 40 MG/1
40 TABLET, DELAYED RELEASE ORAL DAILY
Qty: 90 TABLET | Refills: 1 | Status: SHIPPED | OUTPATIENT
Start: 2021-04-15 | End: 2021-09-13

## 2021-05-03 DIAGNOSIS — I10 ESSENTIAL HYPERTENSION: ICD-10-CM

## 2021-05-03 RX ORDER — LOSARTAN POTASSIUM 50 MG/1
50 TABLET ORAL DAILY
Qty: 90 TABLET | Refills: 3 | Status: SHIPPED | OUTPATIENT
Start: 2021-05-03 | End: 2022-03-28

## 2021-05-03 NOTE — TELEPHONE ENCOUNTER
Dena Jarquin called requesting a refill on the following medications:  Requested Prescriptions     Pending Prescriptions Disp Refills    losartan (COZAAR) 50 MG tablet 90 tablet 3     Sig: Take 1 tablet by mouth daily     Pharmacy verified: [facility]   Pankaj alonzo      Date of last visit: 3/23/2021  Date of next visit (if applicable): 9/71/2750

## 2021-05-10 DIAGNOSIS — F41.9 ANXIETY: ICD-10-CM

## 2021-05-10 RX ORDER — BUSPIRONE HYDROCHLORIDE 10 MG/1
10 TABLET ORAL 2 TIMES DAILY
Qty: 180 TABLET | Refills: 3 | Status: SHIPPED | OUTPATIENT
Start: 2021-05-10 | End: 2022-05-04

## 2021-05-10 NOTE — TELEPHONE ENCOUNTER
[facility] faxed over refill request for Buspar 10mg  Last seen:03/23/2021  Next appointment:05/12/2021  Rx pended.

## 2021-05-12 ENCOUNTER — OFFICE VISIT (OUTPATIENT)
Dept: FAMILY MEDICINE CLINIC | Age: 56
End: 2021-05-12
Payer: COMMERCIAL

## 2021-05-12 ENCOUNTER — ANCILLARY PROCEDURE (OUTPATIENT)
Dept: FAMILY MEDICINE CLINIC | Age: 56
End: 2021-05-12
Payer: COMMERCIAL

## 2021-05-12 VITALS
OXYGEN SATURATION: 98 % | TEMPERATURE: 96.9 F | HEART RATE: 62 BPM | SYSTOLIC BLOOD PRESSURE: 126 MMHG | BODY MASS INDEX: 34.35 KG/M2 | RESPIRATION RATE: 18 BRPM | DIASTOLIC BLOOD PRESSURE: 71 MMHG | WEIGHT: 206.4 LBS

## 2021-05-12 DIAGNOSIS — G89.29 CHRONIC BILATERAL LOW BACK PAIN WITHOUT SCIATICA: Primary | ICD-10-CM

## 2021-05-12 DIAGNOSIS — G89.29 CHRONIC BILATERAL LOW BACK PAIN WITHOUT SCIATICA: ICD-10-CM

## 2021-05-12 DIAGNOSIS — M54.50 CHRONIC BILATERAL LOW BACK PAIN WITHOUT SCIATICA: ICD-10-CM

## 2021-05-12 DIAGNOSIS — M54.50 CHRONIC BILATERAL LOW BACK PAIN WITHOUT SCIATICA: Primary | ICD-10-CM

## 2021-05-12 PROCEDURE — 72100 X-RAY EXAM L-S SPINE 2/3 VWS: CPT | Performed by: NURSE PRACTITIONER

## 2021-05-12 PROCEDURE — 99213 OFFICE O/P EST LOW 20 MIN: CPT | Performed by: NURSE PRACTITIONER

## 2021-05-12 PROCEDURE — 72100 X-RAY EXAM L-S SPINE 2/3 VWS: CPT
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
## 2021-05-17 ASSESSMENT — ENCOUNTER SYMPTOMS
BACK PAIN: 1
VOMITING: 0
DIARRHEA: 0
CONSTIPATION: 0
EYE DISCHARGE: 0
ABDOMINAL PAIN: 0
COUGH: 0
CHEST TIGHTNESS: 0
SHORTNESS OF BREATH: 0
RHINORRHEA: 0

## 2021-06-23 ENCOUNTER — HOSPITAL ENCOUNTER (OUTPATIENT)
Age: 56
Discharge: HOME | End: 2021-06-23
Payer: COMMERCIAL

## 2021-06-23 LAB — TSH SERPL DL<=0.05 MIU/L-ACNC: 0.64 UIU/ML (ref 0.4–4.2)

## 2021-06-23 PROCEDURE — 84443 ASSAY THYROID STIM HORMONE: CPT

## 2021-06-23 PROCEDURE — 36415 COLL VENOUS BLD VENIPUNCTURE: CPT

## 2021-06-23 PROCEDURE — 86800 THYROGLOBULIN ANTIBODY: CPT

## 2021-06-25 LAB — THYROGLOBULIN: NORMAL

## 2021-07-27 ENCOUNTER — TELEPHONE (OUTPATIENT)
Dept: FAMILY MEDICINE CLINIC | Age: 56
End: 2021-07-27

## 2021-07-27 NOTE — TELEPHONE ENCOUNTER
Need copy of labs,     a1c done almost a year ago was not even in a prediabetic range    Once I have the labs I will know how urgent it is. Likely can wait until after surgery and then do follow up    Up to surgeon if she needs seen prior for clearance. ...

## 2021-07-27 NOTE — TELEPHONE ENCOUNTER
walk in? Double book a certain time? Same day appointments are only available     ----- Message from Beryle Lovely, [place] sent at 7/27/2021 12:21 PM EDT -----  Subject: Message to Provider    QUESTIONS  Information for Provider? Pt called due to [employer] labs showing she is   diabetic. Pt has surgery on 8/4 , has to take a covid test and self   isolate until surgery. Pt is needing to know if she needs seen prior to   surgery and if so she only has 7/28 free. Pt had her labs drown in   [place]. Priti Bower ---------------------------------------------------------------------------  --------------  Daniel Abreuzen INFO  What is the best way for the office to contact you? OK to leave message on   voicemail  Preferred Call Back Phone Number? [phone]  ---------------------------------------------------------------------------  --------------  SCRIPT ANSWERS  Relationship to Patient?  Self

## 2021-07-29 ENCOUNTER — HOSPITAL ENCOUNTER (OUTPATIENT)
Age: 56
Discharge: HOME | End: 2021-07-29
Payer: COMMERCIAL

## 2021-07-29 LAB
INFLUENZA A: NOT DETECTED
INFLUENZA B: NOT DETECTED
SARS-COV-2 RNA, RT PCR: NOT DETECTED

## 2021-07-29 PROCEDURE — 87636 SARSCOV2 & INF A&B AMP PRB: CPT

## 2021-08-23 ENCOUNTER — HOSPITAL ENCOUNTER (OUTPATIENT)
Age: 56
Discharge: HOME | End: 2021-08-23
Payer: COMMERCIAL

## 2021-08-23 LAB — TSH SERPL DL<=0.05 MIU/L-ACNC: 1.36 UIU/ML (ref 0.4–4.2)

## 2021-08-23 PROCEDURE — 36415 COLL VENOUS BLD VENIPUNCTURE: CPT

## 2021-08-23 PROCEDURE — 86800 THYROGLOBULIN ANTIBODY: CPT

## 2021-08-23 PROCEDURE — 84443 ASSAY THYROID STIM HORMONE: CPT

## 2021-08-25 LAB — THYROGLOBULIN: NORMAL

## 2021-08-26 ENCOUNTER — OFFICE VISIT (OUTPATIENT)
Dept: FAMILY MEDICINE CLINIC | Age: 56
End: 2021-08-26
Payer: COMMERCIAL

## 2021-08-26 VITALS
DIASTOLIC BLOOD PRESSURE: 72 MMHG | OXYGEN SATURATION: 98 % | HEART RATE: 84 BPM | TEMPERATURE: 98.1 F | BODY MASS INDEX: 33.38 KG/M2 | WEIGHT: 200.6 LBS | SYSTOLIC BLOOD PRESSURE: 114 MMHG | RESPIRATION RATE: 16 BRPM

## 2021-08-26 DIAGNOSIS — R73.09 ELEVATED HEMOGLOBIN A1C: Primary | ICD-10-CM

## 2021-08-26 DIAGNOSIS — M54.50 CHRONIC LEFT-SIDED LOW BACK PAIN WITHOUT SCIATICA: ICD-10-CM

## 2021-08-26 DIAGNOSIS — G89.29 CHRONIC LEFT-SIDED LOW BACK PAIN WITHOUT SCIATICA: ICD-10-CM

## 2021-08-26 LAB — HBA1C MFR BLD: 5.5 %

## 2021-08-26 PROCEDURE — 99214 OFFICE O/P EST MOD 30 MIN: CPT | Performed by: NURSE PRACTITIONER

## 2021-08-26 PROCEDURE — 83036 HEMOGLOBIN GLYCOSYLATED A1C: CPT | Performed by: NURSE PRACTITIONER

## 2021-08-26 RX ORDER — BACLOFEN 10 MG/1
10 TABLET ORAL DAILY PRN
Qty: 90 TABLET | Refills: 2 | Status: SHIPPED | OUTPATIENT
Start: 2021-08-26 | End: 2022-05-09 | Stop reason: SDUPTHER

## 2021-08-26 SDOH — ECONOMIC STABILITY: TRANSPORTATION INSECURITY
IN THE PAST 12 MONTHS, HAS THE LACK OF TRANSPORTATION KEPT YOU FROM MEDICAL APPOINTMENTS OR FROM GETTING MEDICATIONS?: NO

## 2021-08-26 SDOH — ECONOMIC STABILITY: FOOD INSECURITY: WITHIN THE PAST 12 MONTHS, THE FOOD YOU BOUGHT JUST DIDN'T LAST AND YOU DIDN'T HAVE MONEY TO GET MORE.: NEVER TRUE

## 2021-08-26 SDOH — ECONOMIC STABILITY: FOOD INSECURITY: WITHIN THE PAST 12 MONTHS, YOU WORRIED THAT YOUR FOOD WOULD RUN OUT BEFORE YOU GOT MONEY TO BUY MORE.: NEVER TRUE

## 2021-08-26 SDOH — ECONOMIC STABILITY: TRANSPORTATION INSECURITY
IN THE PAST 12 MONTHS, HAS LACK OF TRANSPORTATION KEPT YOU FROM MEETINGS, WORK, OR FROM GETTING THINGS NEEDED FOR DAILY LIVING?: NO

## 2021-08-26 ASSESSMENT — SOCIAL DETERMINANTS OF HEALTH (SDOH): HOW HARD IS IT FOR YOU TO PAY FOR THE VERY BASICS LIKE FOOD, HOUSING, MEDICAL CARE, AND HEATING?: NOT HARD AT ALL

## 2021-08-26 NOTE — PROGRESS NOTES
Kalpana [place] [address] [facility], [facility] [address]  Dept: [phone]  Dept Fax: [phone]    Visit type: Established patient    Reason for Visit: Discuss Labs (07/26/2021- done for [facility]--was told now a diabetic--A1c at 6.9%)         Assessment and Plan       1. Elevated hemoglobin A1c   Reviewed her notes from specialist saying a1c was 6.9, however was unable to find that in her test results via my chart on her phone in office or in care everywhere   POCT a1c is WNL range today   In depth discussion on diagnosis of type 2 diabetes was given while waiting for POCT a1c to be completed   Discussed potentially adding metformin, ADA diet, benefit of continuing losartan for kidney protection and adding a statin for CV protection   Since a1c was normal will not give diagnosis but encourage patient to do lifestyle changes, and reach out to other provider to send a1c results to office   -     POCT glycosylated hemoglobin (Hb A1C)  -     Lipid Panel; Future  -     Hemoglobin A1C; Future  2. Chronic left-sided low back pain without sciatica   Continue stretching exercises   -     baclofen (LIORESAL) 10 MG tablet; Take 1 tablet by mouth daily as needed (muscle relaxant), Disp-90 tablet, R-2Normal      Return for keep fu apt . Subjective       Here for fu abnormal labs. Had preop testing at [facility]  Was told that her a1c 6.9. in August 2021  Had mass by aorta arch that was benign but was removed  Diet- did keto diet for a while but felt ill, eating more vegetables and fruit   Exercise- was doing physical therapy for back until they found mass in chest, has not been active since then    Chronic back pain  Has been doing stretching  Would like refill of muscle relaxer which has helped  Denies cauda equina symptoms          Review of Systems   Constitutional: Positive for fatigue. Negative for activity change, appetite change and fever.    HENT: Negative for congestion, ear pain and rhinorrhea. Eyes: Negative for discharge and visual disturbance. Respiratory: Negative for cough, chest tightness and shortness of breath. Cardiovascular: Negative for chest pain and palpitations. Gastrointestinal: Negative for abdominal pain, constipation, diarrhea and vomiting. Genitourinary: Negative for difficulty urinating and hematuria. Musculoskeletal: Negative for arthralgias and myalgias. Skin: Negative for rash. Neurological: Negative for dizziness, weakness, numbness and headaches. Psychiatric/Behavioral: The patient is not nervous/anxious.          Allergies   Allergen Reactions    Morphine Other (See Comments)     \"not sure of reaction\"    Codeine     Seasonal        Outpatient Medications Prior to Visit   Medication Sig Dispense Refill    busPIRone (BUSPAR) 10 MG tablet Take 1 tablet by mouth 2 times daily 180 tablet 3    losartan (COZAAR) 50 MG tablet Take 1 tablet by mouth daily 90 tablet 3    pantoprazole (PROTONIX) 40 MG tablet Take 1 tablet by mouth daily 90 tablet 1    Calcium Carbonate (CALCIUM 600 PO) Take by mouth      triamcinolone (KENALOG) 0.1 % ointment Apply topically 2 times daily as needed (eczema) 30 g 1    meloxicam (MOBIC) 15 MG tablet Take 1 tablet by mouth daily 90 tablet 3    metoprolol tartrate (LOPRESSOR) 25 MG tablet TAKE ONE TABLET BY MOUTH TWICE DAILY 180 tablet 3    fexofenadine (ALLEGRA) 180 MG tablet Take 1 tablet by mouth as needed (allergies) 90 tablet 3    vitamin D (CHOLECALCIFEROL) 1000 UNIT TABS tablet Take 1,000 Units by mouth      levothyroxine (SYNTHROID) 150 MCG tablet Take 1 tablet by mouth daily 90 tablet 1    Ascorbic Acid (VITAMIN C) 100 MG tablet Take 500 mg by mouth daily       aspirin EC 81 MG EC tablet Take 1 tablet by mouth daily 30 tablet 3    ferrous sulfate 325 (65 FE) MG tablet Take 325 mg by mouth daily (with breakfast)      calcium-vitamin D (OSCAL-500) 500-200 MG-UNIT per tablet Take 1 tablet by mouth daily Calcium 600 mg supplement 1 tab daily      baclofen (LIORESAL) 10 MG tablet Take 1 tablet by mouth 3 times daily (Patient taking differently: Take 10 mg by mouth daily ) 90 tablet 3     No facility-administered medications prior to visit. Past Medical History:   Diagnosis Date    Anxiety     Arthritis     Hx of blood clots     Hypertension     Primary thyroid cancer ([facility] [address] 10/16/2019    Pure hyperglyceridemia 8/17/2018        Social History     Tobacco Use    Smoking status: Never Smoker    Smokeless tobacco: Never Used   Substance Use Topics    Alcohol use: No        Past Surgical History:   Procedure Laterality Date    HYSTERECTOMY, TOTAL ABDOMINAL  3/31/2016    THYROID SURGERY         Family History   Problem Relation Age of Onset    High Blood Pressure Mother     Heart Disease Mother     Osteoporosis Mother     Stroke Father     High Blood Pressure Father     Heart Disease Father     Heart Disease Brother     Heart Disease Brother     Diabetes Maternal Grandmother        Objective       /72 (Site: Right Upper Arm, Position: Sitting, Cuff Size: Large Adult)   Pulse 84   Temp 98.1 °F (36.7 °C) (Temporal)   Resp 16   Wt 200 lb 9.6 oz (91 kg)   LMP 02/05/2016   SpO2 98%   BMI 33.38 kg/m²   Physical Exam  Vitals reviewed. Constitutional:       Appearance: She is well-developed. HENT:      Head: Normocephalic and atraumatic. Right Ear: External ear normal.      Left Ear: External ear normal.   Eyes:      Conjunctiva/sclera: Conjunctivae normal.   Neck:      Thyroid: No thyromegaly. Trachea: Trachea normal.   Cardiovascular:      Rate and Rhythm: Normal rate and regular rhythm. Heart sounds: Normal heart sounds. No murmur heard. No friction rub. No gallop. Pulmonary:      Effort: Pulmonary effort is normal.      Breath sounds: Normal breath sounds. Abdominal:      General: Bowel sounds are normal.      Palpations: Abdomen is soft. Tenderness:  There is no abdominal tenderness. Musculoskeletal:         General: Normal range of motion. Cervical back: Normal range of motion and neck supple. No spinous process tenderness. Skin:     General: Skin is warm and dry. Findings: No erythema or rash. Neurological:      Mental Status: She is alert and oriented to person, place, and time. Psychiatric:         Speech: Speech normal.         Behavior: Behavior normal.         Thought Content:  Thought content normal.           Data Reviewed and Summarized       Labs:      [address]  08/04/2021  Component      Calcium   Magnesium   BUN   Sodium   Potassium   Chloride   CO2   Creatinine   Bun/Crea Ratio   EST GFR,Non    EST GFR,    Anion Gap   Glucose   Osmolality (Calculated)   Phosphorous   Component 08/04/2021 08/04/2021 08/04/2021 08/04/2021 07/26/2021 11/05/2019 10/26/2019 10/25/2019 10/25/2019               Calcium -- 9.4 -- -- -- 10.1 8.7 8.9 --   Magnesium -- -- 1.8 -- -- -- -- -- 1.7   BUN 13 -- -- -- 24High     -- -- -- --   Sodium 140 -- -- -- 139 -- -- -- --   Potassium 3.9 -- -- -- 4.5 -- -- -- --   Chloride 104 -- -- -- 103 -- -- -- --   CO2 28 -- -- -- 29 -- -- -- --   Creatinine 0.68 -- -- -- 0.71 -- -- -- --   Bun/Crea Ratio 19 -- -- -- 34 -- -- -- --   EST GFR,Non  >=60 -- -- -- >=60 -- -- -- --   EST GFR,  >=60 -- -- -- >=60 -- -- -- --   Anion Gap 12 -- -- -- 12 -- -- -- --   Glucose 135High     -- -- -- -- -- -- -- --   Osmolality (Calculated) 295 -- -- -- -- -- -- -- --   Phosphorous -- -- -- 4.1 -- -- -- -- --   Others  [address]  08/04/2021  Component      Case Report   Clinical History   Pathologic Diagnosis   Microscopic Description   Gross Description   Images   Product ABO/RH(D) NUMBER   Product ABO/RH(D)   Diagnosis Comments   For Immediate Release to Patient's [employer]? Component 08/04/2021 07/26/2021 07/26/2021 10/25/2019          Case Report Surgical Pathology Report                         Case: [id number]                                  Authorizing Provider: Mable Moran MD            Collected:           08/04/2021 09:33 AM          Ordering Location:     [facility]                Received:            08/04/2021 10:45 AM          Pathologist:           Quentin Zheng MD                                                         Specimens:   A) - SURG PATH, Mediastinal mass                                                                    B) - SURG PATH, Central neck tissue -- -- Surgical Pathology Report                         Case: [id number]                                  Authorizing Provider: Mable Moran MD            Collected:           10/25/2019 09:04 AM          Ordering Location:     [facility]                Received:            10/28/2019 07:46 AM          Pathologist:           Apolinar Plummer MD                                                   Specimens:   A) - SURG PATH, Left thyroid lobe, stitch on papillary thyroid cancer                               B) - SURG PATH, Right thyroid lobe                                                                  C) - SURG PATH, Central neck tissue   Clinical History Preop Diagnosis:  Papillary thyroid carcinoma.  Medical History:  Anxiety.  Intermittent palpitations.  Abnormal stress ECG.  Osteopenia after menopause.  Obesity.  History of blood clots.  Stress fracture.  Fibroid, uterine.  Family history of premature coronary artery disease.  Benign essential hypertension.  Pure hyperglyceridemia.  Arthritis.  Papillary thyroid carcinoma.   -- -- Preoperative Diagnosis:  Papillary thyroid carcinoma.  Medical History:  Papillary thyroid carcinoma.     Pathologic Diagnosis A. Mediastinal mass, excision:  Hyperplastic ectopic thyroid tissue.    B.  Lymph node, central neck, excision:  Reactive lymph node with anthracosilicotic dust. -- -- A. Left thyroid lobe, excision:  · Papillary thyroid carcinoma, multifocal (largest focus 1.8 cm).  · Metastatic papillary thyroid carcinoma in one perithyroidal lymph node, 0.1 cm, no extranodal extension (1/1).  · Parathyroid gland, negative for carcinoma.  · Hyperplastic nodules.  · Chronic lymphocytic thyroiditis.    COMMENT: PAX8 is attempted on the perithyroidal lymph node, but the cells of interest are no longer present.    B. Right thyroid lobe, excision:  · Papillary thyroid carcinoma, multifocal (largest focus 2.9 cm).  · Chronic lymphocytic thyroiditis.  · One lymph node, negative for carcinoma (0/1).    C. Central neck tissue, excision:  · Metastatic papillary thyroid carcinoma in one lymph node, 0.1 cm, no extranodal extension (1/6).   · Psammoma bodies only in three lymph nodes.  · Hyperplastic thyroid nodule.    SYNOPTIC REPORT FOR THYROID CANCER:  Procedure:  Total thyroidectomy  Tumor location:  Left and right lobes  Tumor size:  Right lobe:  2.9 cm, 0.1 cm; left lobe:  1.8 cm, 0.7 cm, 0.1 cm  WHO Histologic type:  Papillary thyroid carcinoma, classic  Lymphovascular invasion (LVI):  Indeterminate  Perineural invasion (PNI):  Not identified  Encapsulated:  No  Tumor extent:    Extrathyroidal extension:  Not identified    Focality:  Multifocal    Invasion of adjacent structures:  Not applicable  Margins:    Main specimen margins:  Negative for carcinoma    Additional separately submitted margins (frozen tissue specimens):  Not applicable    Additional resection margins (non-frozen tissue):  Not applicable    Distance of tumor to nearest margin:  0.2 cm  Lymph nodes:    Number examined: 8    Number involved: 2     Size of largest metastatic jeronimo deposit: 0.1 cm    Extranodal extension [MICHELLE]: Not identified  pTNM classification: pT2 N1a    **The above synoptic report complies, in slightly modified form, with the guidelines of the College of American Pathologists Protocols and the AJCC Cancer Staging Manual, 8th  edition, 2017, for the reporting of cancer specimens. **    All controls show appropriate reactivity.    All immunohistochemistry, in situ hybridization, and histochemical tests were developed by and are performed at the [address] [place] [address], [address] [place] [address], [address] [place] [address]. All tests reported here, except those addressing HER2 overexpression as a predictive marker, have not been cleared by or approved by the [employer] and Drug Administration (FDA). The laboratory is regulated under CLIA as qualified to perform high-complexity testing. The tests are used for clinical purposes. They should not be regarded as investigational or for research. Microscopic Description A microscopic examination was performed. -- -- A microscopic examination was performed.    Gross Description The specimens are received in two properly labeled containers with the patient's name and accession number.     A.   The specimen is designated \"mediastinal mass\" and consists of a 23.6 gram unoriented well circumscribed dark red to tan irregularly lobulated rubbery mass measuring 4.5 x 4.3 x 2.6 cm.  The external surface is inked black.  The external surface is minimally disrupted.  Serial sectioning reveals heterogeneous cut surfaces ranging from red-brown to tan and solid to cystic filled with tan-yellow translucent gelatinous material demonstrating irregular foci of punctate hemorrhaging comprising 10% of the cut surfaces with minute foci of calcifications scattered throughout.  RS 5    Summary of Cassettes: A1-5, representative sections     B.   The specimen is designated \"central neck tissue\" and consists of a tan-gray, rubbery possible lymph node with a scant amount of attached adipose tissue measuring 0.8 x 0.4 x 0.4 cm.  Bisection reveals tan solid cut surfaces.  TE 1     Summary of Cassettes: B1, one possible lymph node, bisected     Lab Use Only: [facility] [id number]     Grosser for this case was: Bucky Pardo -- -- The specimens are received in three properly labeled containers with the patient's name and accession number.     A.  The specimen is designated \"left thyroid lobe, stitch on papillary thyroid cancer\" and consists of a 25.5 gram intact, enlarged, distorted left thyroid lobe (6.6 cm superior inferior, 3.8 cm transverse dimension, 2.7 cm anterior posterior).  Contained within the superior pole is an encapsulated nodule (1.8 x 1.7 x 1.6 cm) that is 65% solid and papillary, and 35% cystic.  This nodule has an orienting suture on its surface, designated \"papillary thyroid cancer\", and is 0.1 cm to the thyroid surface, and 1.1 cm from the isthmic edge.  Two additional gelatinous nodules are identified: one within the superior pole (0.7 x 0.6 x 0.5 cm), the other within the inferior pole (3.8 x 2.6 x 2.3 cm).  These two nodules contain occasional foci of hemorrhage, fibrosis, and calcification.  (P, TP) RS 10    Ink code:  thyroid surface = black, isthmus edge = orange.    Summary of Cassettes: A1-6, encapsulated superior pole nodule, submitted entirely; A7, gelatinous nodule, superior pole; A8-9, gelatinous nodule, inferior pole; A10, uninvolved parenchyma perpendicular to isthmic edge    B.  The specimen is designated \"right thyroid lobe\" and consists of a 21.4 gram intact, enlarged, distorted right thyroid lobe (4.8 cm superior inferior, 3.1 cm transverse dimension, 3.3 cm anterior posterior).  There is an encapsulated dominant nodule localized within the mid segment, with extension to the superior and inferior poles (2.9 x 2.8 x 2.8 cm), 0.4 cm from the isthmic edge and 0.1 cm to the thyroid surface.  This nodule is approximately 65% centrally gray-pink and solid, and 35% cystic.  The cystic component contains red-brown, gelatinous material.  The nodule is focally calcified peripherally.  The uninvolved parenchyma is red-brown, spongy, and otherwise grossly unremarkable.  RS 24    Ink Code:  thyroid surface = black, isthmic edge = orange.    Summary of Cassettes: B1-23, periphery of nodule, submitted entirely (B1 and B6 perpendicular to isthmic edge); B24, normal-appearing parenchyma     C.  The specimen is designated \"central neck tissue\" and consists of a 2.8 x 2.6 x 1.3 cm aggregation of fibrofatty tissue that contains eight possible lymph nodes (0.1 cm-1.7 x 0.6 x 0.4 cm).  RS 3    Summary of Cassettes: C1, three lymph nodes; C2, four lymph nodes; C3, one bisected lymph node     Lab Use Only: [facility] [place] [id number]  Grosser for this case was: Briana Newman -- -- -- --   Product ABO/RH(D) NUMBER -- [id number] --   Product ABO/RH(D) -- Amigo Dace --   Diagnosis Comments No nuclear features of papillary thyroid carcinoma are identified.    The case was reviewed with Dr. Laura Esquivel from [facility] pathology service.   -- -- --   For Immediate Release to [facility]'s [facility]   NoAbnormal     -- -- --   BLOOD [address]  08/04/2021  Component      ABO/RH(D) TYPE   ANTIBODY SCREEN   EXPIRATION DATE   Component 08/04/2021 07/26/2021 07/26/2021 07/26/2021 07/26/2021           ABO/RH(D) TYPE O NEG O NEG -- ONEG ONEG   ANTIBODY SCREEN -- -- NEG -- --   EXPIRATION DATE -- -- -- [id number]   BLOOD BANK PRODUCTS  [address]  07/26/2021  Component      BLOOD COMPONENT TYPE   UNIT NUMBER   UNIT STATUS   PRODUCT CODE   Component 07/26/2021 07/26/2021        BLOOD COMPONENT TYPE Red Cells, Leukoreduced Red Cells, Leukoreduced   UNIT NUMBER [id number]   UNIT STATUS released released   PRODUCT CODE [id number]   ENDOCRINE STUDIES  [address]  07/26/2021  Component      Intact PTH   Thyroglobulin, Tumor Marker   THYROGLOBULIN ANTIBODY   Thyroglobulin Interpretation   TSH Free T4   Component 07/26/2021 07/26/2021 12/29/2020 12/29/2020 11/05/2019           Intact PTH -- -- -- -- 59.0   Thyroglobulin, Tumor Marker -- -- 3. 5High      -- --   THYROGLOBULIN ANTIBODY -- -- <1.8 -- --   Thyroglobulin Interpretation -- -- SEE COMMENTS -- --   TSH 0.386Low     -- -- 0.464Low     --   Free T4 -- 1. 81High     -- -- --   HEMATOLOGY, CBC  [address]  07/26/2021  Component      WBC Count   RBC Count   Hemoglobin   Hematocrit   Mean Cell Volume   Mean Cell Hgb   Mean Cell Hgb Conc   RBC Distribution   Platelet Count   Mean Platelet Volume   Nucleated RBC   Component 07/26/2021       WBC Count 7.15   RBC Count 4.72   Hemoglobin 14.3   Hematocrit 43.6   Mean Cell Volume 92.4   Mean Cell Hgb 30.3   Mean Cell Hgb Conc 32.8   RBC Distribution 13.5   Platelet Count 407   Mean Platelet Volume 87.1   Nucleated RBC 0.0   HEMATOLOGY, DIFFERENTIAL  [address]  07/26/2021  Component      DIFF STATUS   Segs + Bands Auto   Immature Grans %   Lymphocyte % Auto   Monocyte % Auto   Eosinophil % Auto   Basophil % Auto   Segs + Bands,Absolute Auto   Immature Grans Absolute   Abs Lymph Auto   Abs Mono Auto   Abs Eos Auto   Abs Baso Auto   Component 07/26/2021       DIFF STATUS Electronic Differential   Segs + Bands Auto 62.2   Immature Grans % 0.7   Lymphocyte % Auto 23.6   Monocyte % Auto 10.6   Eosinophil % Auto 2.1   Basophil % Auto 0.8   Segs + Bands,Absolute Auto 4.44   Immature Grans Absolute 0.05   Abs Lymph Auto 1.69   Abs Mono Auto 0.76   Abs Eos Auto 0.15   Abs Baso Auto 0.06   POINT OF CARE TESTS  [facility]'s [address]  07/01/2021  Component      Beta Hcg,Urine (Poc Device)   Creatinine (Poc Device)   Est Gfr, (Poc Device)   Component 07/01/2021 10/25/2019        Beta Hcg,Urine (Poc Device) -- Negative   Creatinine (Poc Device) 0.70 --   Est Gfr, (Poc Device) >60 --   CHEMISTRY, [address] [address] [facility]  11/05/2019  Component      25-OH Vitamin D Total   Component 11/05/2019       25-OH Vitamin D Total 36.4   Return to top   Index     BLOOD BANK PRODUCTS ([address] [place] [address])   BLOOD COMPONENT TYPE   PRODUCT CODE   UNIT NUMBER   UNIT STATUS   BLOOD BANK RESULTS ([address] [place] [address])   ABO/RH(D) TYPE   ANTIBODY SCREEN   EXPIRATION DATE   CHEMISTRY ([address] [place] [address])   Anion Gap   BUN   Bun/Crea Ratio   CO2   Calcium   Chloride   Creatinine   EST GFR, African American   EST GFR,Non African American   Glucose   Magnesium   Osmolality (Calculated)   Phosphorous   Potassium   Sodium   CHEMISTRY, SPECIAL ([address] [place] [address])   25-OH Vitamin D Total   ENDOCRINE STUDIES ([address] [place] [address])   Free T4   Intact PTH   THYROGLOBULIN ANTIBODY   TSH   Thyroglobulin Interpretation   Thyroglobulin, Tumor Marker   HEMATOLOGY, CBC ([address] [place] [address])   Hematocrit   Hemoglobin   Mean Cell Hgb   Mean Cell Hgb Conc   Mean Cell Volume   Mean Platelet Volume   Nucleated RBC   Platelet Count   RBC Count   RBC Distribution   WBC Count   HEMATOLOGY, DIFFERENTIAL ([facility] [place])   Abs Baso Auto   Abs Eos Auto   Abs Lymph Auto   Abs Mono Auto   Basophil % Auto   DIFF STATUS   Eosinophil % Auto   Immature Grans %   Immature Grans Absolute   Lymphocyte % Auto   Monocyte % Auto   Segs + Bands Auto   Segs + Bands,Absolute Auto   POINT OF CARE TESTS ([facility] [place])   Beta Hcg,Urine (Poc Device)   Creatinine (Poc Device)   Est Gfr, (Poc Device)   Others ([address] [place] [address])   Case Report   Clinical History   Diagnosis Comments   For Immediate Release to [facility]     Gross Description   Images   Microscopic Description   Pathologic Diagnosis   Product ABO/RH(D)   Product ABO/RH(D) NUMBER  Return to top           Imaging/Testing:        Marta Olvera, APRN - CNP

## 2021-08-27 ASSESSMENT — ENCOUNTER SYMPTOMS
SHORTNESS OF BREATH: 0
EYE DISCHARGE: 0
DIARRHEA: 0
VOMITING: 0
CONSTIPATION: 0
RHINORRHEA: 0
CHEST TIGHTNESS: 0
COUGH: 0
ABDOMINAL PAIN: 0

## 2021-09-01 ENCOUNTER — HOSPITAL ENCOUNTER (EMERGENCY)
Age: 56
Discharge: HOME | End: 2021-09-01
Payer: COMMERCIAL

## 2021-09-01 VITALS
SYSTOLIC BLOOD PRESSURE: 140 MMHG | HEART RATE: 72 BPM | HEIGHT: 65 IN | WEIGHT: 200 LBS | TEMPERATURE: 97.5 F | RESPIRATION RATE: 16 BRPM | DIASTOLIC BLOOD PRESSURE: 73 MMHG | BODY MASS INDEX: 33.32 KG/M2 | OXYGEN SATURATION: 97 %

## 2021-09-01 DIAGNOSIS — L30.9 DERMATITIS: Primary | ICD-10-CM

## 2021-09-01 PROCEDURE — 99213 OFFICE O/P EST LOW 20 MIN: CPT | Performed by: NURSE PRACTITIONER

## 2021-09-01 PROCEDURE — 99213 OFFICE O/P EST LOW 20 MIN: CPT

## 2021-09-01 RX ORDER — PREDNISONE 20 MG/1
20 TABLET ORAL 2 TIMES DAILY
Qty: 10 TABLET | Refills: 0 | Status: SHIPPED | OUTPATIENT
Start: 2021-09-01 | End: 2021-09-06

## 2021-09-01 ASSESSMENT — ENCOUNTER SYMPTOMS
COUGH: 0
RHINORRHEA: 0
SHORTNESS OF BREATH: 0
NAUSEA: 0
CHEST TIGHTNESS: 0
DIARRHEA: 0
SORE THROAT: 0
VOMITING: 0

## 2021-09-01 NOTE — ED TRIAGE NOTES
Pt ambulatory into [facility] with c/o rash to arms, legs and back for the past two days. Pt states she thinks her eczema is flaring up. [place] [facility] [facility]. Pt denies pain but c/o itch.

## 2021-09-01 NOTE — ED PROVIDER NOTES
[place] [id number]  Urgent Care Encounter       CHIEF COMPLAINT       Chief Complaint   Patient presents with    Rash     back arms and legs       Nurses Notes reviewed and I agree except as noted in the HPI. HISTORY OF PRESENT ILLNESS   Bette Cordon is a 55 y.o. female who presents [facility] urgent care for evaluation of rash. She reports the rash started yesterday. She reports that it initially started on her back and is now on her leg and arms. She does report over the weekend that she was out of the state in the [id number]. She reports pruritus. She did state that she was stung by a caterpillar but did not have a reaction  initially. The history is provided by the patient. No  was used. REVIEW OF SYSTEMS     Review of Systems   Constitutional: Negative for activity change, appetite change, chills, fatigue and fever. HENT: Negative for ear discharge, ear pain, rhinorrhea and sore throat. Respiratory: Negative for cough, chest tightness and shortness of breath. Cardiovascular: Negative for chest pain. Gastrointestinal: Negative for diarrhea, nausea and vomiting. Genitourinary: Negative for dysuria. Skin: Positive for rash. Allergic/Immunologic: Negative for environmental allergies and food allergies. Neurological: Negative for dizziness and headaches. PAST MEDICAL HISTORY         Diagnosis Date    Anxiety     Arthritis     Hx of blood clots     Hypertension     Primary thyroid cancer ([facility] [address] 10/16/2019    Pure hyperglyceridemia 8/17/2018       SURGICALHISTORY     Patient  has a past surgical history that includes Hysterectomy, total abdominal (3/31/2016); Thyroid surgery; and other surgical history.     CURRENT MEDICATIONS       Discharge Medication List as of 9/1/2021  9:31 AM      CONTINUE these medications which have NOT CHANGED    Details   baclofen (LIORESAL) 10 MG tablet Take 1 tablet by mouth daily as needed (muscle relaxant), Disp-90 tablet, R-2Normal      busPIRone (BUSPAR) 10 MG tablet Take 1 tablet by mouth 2 times daily, Disp-180 tablet, R-3Normal      losartan (COZAAR) 50 MG tablet Take 1 tablet by mouth daily, Disp-90 tablet, R-3Normal      pantoprazole (PROTONIX) 40 MG tablet Take 1 tablet by mouth daily, Disp-90 tablet, R-1Normal      Calcium Carbonate (CALCIUM 600 PO) Take by mouthHistorical Med      triamcinolone (KENALOG) 0.1 % ointment Apply topically 2 times daily as needed (eczema), Topical, 2 TIMES DAILY PRN Starting Tue 3/23/2021, Disp-30 g, R-1, Normal      meloxicam (MOBIC) 15 MG tablet Take 1 tablet by mouth daily, Disp-90 tablet, R-3Normal      metoprolol tartrate (LOPRESSOR) 25 MG tablet TAKE ONE TABLET BY MOUTH TWICE DAILY, Disp-180 tablet, R-3Normal      fexofenadine (ALLEGRA) 180 MG tablet Take 1 tablet by mouth as needed (allergies), Disp-90 tablet, R-3Normal      vitamin D (CHOLECALCIFEROL) 1000 UNIT TABS tablet Take 1,000 Units by mouthHistorical Med      levothyroxine (SYNTHROID) 150 MCG tablet Take 1 tablet by mouth daily, Disp-90 tablet, R-1Normal      Ascorbic Acid (VITAMIN C) 100 MG tablet Take 500 mg by mouth daily Historical Med      aspirin EC 81 MG EC tablet Take 1 tablet by mouth daily, Disp-30 tablet, R-3OTC      ferrous sulfate 325 (65 FE) MG tablet Take 325 mg by mouth daily (with breakfast)      calcium-vitamin D (OSCAL-500) 500-200 MG-UNIT per tablet Take 1 tablet by mouth daily Calcium 600 mg supplement 1 tab dailyOTC             ALLERGIES     Patient is is allergic to morphine, codeine, and seasonal.    Patients   Immunization History   Administered Date(s) Administered    COVID-19, Moderna, PF, 100mcg/0.5mL 06/14/2021, 07/13/2021    Influenza, Quadv, IM, (6 mo and older Fluzone, Flulaval, Fluarix and 3 yrs and older Afluria) 12/02/2020    Influenza, Quadv, IM, PF (6 mo and older Fluzone, Flulaval, Fluarix, and 3 yrs and older Afluria) 10/16/2019    PPD Test 01/18/2013, 01/25/2013    Tdap (Boostrix, Adacel) 12/02/2020       FAMILY HISTORY     Patient's family history includes Diabetes in her maternal grandmother; Heart Disease in her brother, brother, father, and mother; High Blood Pressure in her father and mother; Osteoporosis in her mother; Stroke in her father. SOCIAL HISTORY     Patient  reports that she has never smoked. She has never used smokeless tobacco. She reports that she does not drink alcohol and does not use drugs. PHYSICAL EXAM     ED TRIAGE VITALS  BP: (!) 140/73, Temp: 97.5 °F (36.4 °C), Pulse: 72, Resp: 16, SpO2: 97 %,Estimated body mass index is 33.28 kg/m² as calculated from the following:    Height as of this encounter: 5' 5\" (1.651 m). Weight as of this encounter: 200 lb (90.7 kg). ,Patient's last menstrual period was 02/05/2016. Physical Exam  Vitals and nursing note reviewed. Constitutional:       General: She is not in acute distress. Appearance: Normal appearance. She is not ill-appearing, toxic-appearing or diaphoretic. HENT:      Head: Normocephalic. Right Ear: Ear canal and external ear normal.      Left Ear: Ear canal and external ear normal.      Nose: Nose normal. No congestion or rhinorrhea. Mouth/Throat:      Mouth: Mucous membranes are moist.      Pharynx: Oropharynx is clear. No oropharyngeal exudate or posterior oropharyngeal erythema. Cardiovascular:      Rate and Rhythm: Normal rate. Pulses: Normal pulses. Pulmonary:      Effort: Pulmonary effort is normal. No respiratory distress. Breath sounds: No stridor. No wheezing or rhonchi. Abdominal:      General: Abdomen is flat. Bowel sounds are normal.      Palpations: Abdomen is soft. Musculoskeletal:         General: No swelling or tenderness. Normal range of motion. Cervical back: Normal range of motion. Skin:     Findings: Rash present. Rash is papular. Neurological:      General: No focal deficit present.       Mental Status: She is alert and oriented to person, place, and time. Psychiatric:         Mood and Affect: Mood normal.         Behavior: Behavior normal.         DIAGNOSTIC RESULTS     Labs:No results found for this visit on 09/01/21. IMAGING:    No orders to display         EKG: None      URGENT CARE COURSE:     Vitals:    09/01/21 0919   BP: (!) 140/73   Pulse: 72   Resp: 16   Temp: 97.5 °F (36.4 °C)   TempSrc: Temporal   SpO2: 97%   Weight: 200 lb (90.7 kg)   Height: 5' 5\" (1.651 m)       Medications - No data to display         PROCEDURES:  None    FINAL IMPRESSION      1. Dermatitis          DISPOSITION/ PLAN     Patient seen and evaluated for rash. Rash knee pain insect related. Will medicate with prednisone and Valisone cream. She is instructed to follow-up with her PCP in 3 to 5 days with new or worsening symptoms. She is agreeable with the above plan and denies questions or concerns at this time. PATIENT REFERRED TO:  YOBANY Torres CNP  [address] [place] [address]. / [facility] [id number]      DISCHARGE MEDICATIONS:  Discharge Medication List as of 9/1/2021  9:31 AM      START taking these medications    Details   predniSONE (DELTASONE) 20 MG tablet Take 1 tablet by mouth 2 times daily for 5 days, Disp-10 tablet, R-0Normal      betamethasone valerate (VALISONE) 0.1 % ointment Apply topically 2 times daily. , Disp-45 g, R-0, Normal             Discharge Medication List as of 9/1/2021  9:31 AM          Discharge Medication List as of 9/1/2021  9:31 AM          Lexine Peabody, APRN - CNP    (Please note that portions of this note were completed with a voice recognition program. Efforts were made to edit the dictations but occasionally words are mis-transcribed.)           Lexine Peabody, APRN - CNP  09/01/21 [id number]

## 2021-09-01 NOTE — ED NOTES
Pt verbalized discharge instructions. Pt informed to go to ER if develop chest pain, shortness of breath or abdominal pain. Pt ambulatory out in stable condition. Assessment unchanged.        Axel Lozano, RN  09/01/21 1167

## 2021-09-13 ENCOUNTER — OFFICE VISIT (OUTPATIENT)
Dept: CARDIOLOGY CLINIC | Age: 56
End: 2021-09-13
Payer: COMMERCIAL

## 2021-09-13 VITALS
WEIGHT: 200.4 LBS | HEART RATE: 63 BPM | HEIGHT: 65 IN | BODY MASS INDEX: 33.39 KG/M2 | SYSTOLIC BLOOD PRESSURE: 117 MMHG | DIASTOLIC BLOOD PRESSURE: 70 MMHG

## 2021-09-13 DIAGNOSIS — I10 ESSENTIAL HYPERTENSION: ICD-10-CM

## 2021-09-13 DIAGNOSIS — R00.2 INTERMITTENT PALPITATIONS: Primary | ICD-10-CM

## 2021-09-13 DIAGNOSIS — R94.39 ABNORMAL STRESS ECG: ICD-10-CM

## 2021-09-13 DIAGNOSIS — R06.02 SOB (SHORTNESS OF BREATH) ON EXERTION: ICD-10-CM

## 2021-09-13 PROBLEM — R07.9 CHEST PAIN: Status: RESOLVED | Noted: 2019-07-23 | Resolved: 2021-09-13

## 2021-09-13 PROBLEM — E78.1 PURE HYPERGLYCERIDEMIA: Status: RESOLVED | Noted: 2018-08-17 | Resolved: 2021-09-13

## 2021-09-13 PROCEDURE — 99214 OFFICE O/P EST MOD 30 MIN: CPT | Performed by: INTERNAL MEDICINE

## 2021-09-13 RX ORDER — PANTOPRAZOLE SODIUM 40 MG/1
TABLET, DELAYED RELEASE ORAL
Qty: 90 TABLET | Refills: 3 | Status: SHIPPED | OUTPATIENT
Start: 2021-09-13 | End: 2022-09-06

## 2021-09-13 NOTE — PROGRESS NOTES
Chief Complaint   Patient presents with    6 Month Follow-Up    Palpitations   originally  patient  Here for abnormal EKG and cp      6 month follow up. EKG done 3-. Occasional palpitations- stable and better    Sob on exertion -mainly on stairs -chronic unchanged     Denied Chest pain,  Dizziness, edema   No N/v/d      FHX  Dad's first heart attck was when 62years old. One brother was 62 when he had heart attack and passed away and   her other brother was 54 when he had his heart attack and he is still living.   Taryn Carlos had CABG at age 55        nevere smoked      Patient Active Problem List   Diagnosis    Anxiety    Fibroid, uterine    Intermittent palpitations    Family history of premature CAD-father and two brother had mi at age 62   [facility] Abnormal stress ECG-st depression of 0.6    Essential hypertension    Seasonal allergies    Osteopenia after menopause    Class 1 obesity due to excess calories with serious comorbidity and body mass index (BMI) of 33.0 to 33.9 in adult    Primary thyroid cancer (HCC)    Papillary thyroid carcinoma (HCC)    Non-toxic multinodular goiter    SOB (shortness of breath) on exertion    Chronic right shoulder pain    Osteoarthritis of pelvis    Vitamin D deficiency    Hearing loss of left ear       Past Surgical History:   Procedure Laterality Date    HYSTERECTOMY, TOTAL ABDOMINAL  3/31/2016    OTHER SURGICAL HISTORY      mass removed from chest non cancerous    THYROID SURGERY         Allergies   Allergen Reactions    Morphine Other (See Comments)     \"not sure of reaction\"    Codeine     Seasonal         Family History   Problem Relation Age of Onset    High Blood Pressure Mother     Heart Disease Mother     Osteoporosis Mother     Stroke Father     High Blood Pressure Father     Heart Disease Father     Heart Disease Brother     Heart Disease Brother     Diabetes Maternal Grandmother         Social History     Socioeconomic History    Marital status:      Spouse name: Mauri Islas Number of children: Not on file    Years of education: Not on file    Highest education level: Not on file   Occupational History    Not on file   Tobacco Use    Smoking status: Never Smoker    Smokeless tobacco: Never Used   Vaping Use    Vaping Use: Never used   Substance and Sexual Activity    Alcohol use: No    Drug use: No    Sexual activity: Yes     Partners: Male   Other Topics Concern    Not on file   Social History Narrative    Not on file     Social Determinants of Health     Financial Resource Strain: Low Risk     Difficulty of Paying Living Expenses: Not hard at all   Food Insecurity: No Food Insecurity    Worried About [address] in the Last Year: Never true    Brenden of Food in the Last Year: Never true   Transportation Needs: No Transportation Needs    Lack of Transportation (Medical): No    Lack of Transportation (Non-Medical): No   Physical Activity:     Days of Exercise per Week:     Minutes of Exercise per Session:    Stress:     Feeling of Stress :    Social Connections:     Frequency of Communication with Friends and Family:     Frequency of Social Gatherings with Friends and Family:     Attends [employer] Services:     Active Member of Clubs or Organizations:     Attends Club or Organization Meetings:     Marital Status:    Intimate Partner Violence:     Fear of Current or Ex-Partner:     Emotionally Abused:     Physically Abused:     Sexually Abused:        Current Outpatient Medications   Medication Sig Dispense Refill    betamethasone valerate (VALISONE) 0.1 % ointment Apply topically 2 times daily.  45 g 0    baclofen (LIORESAL) 10 MG tablet Take 1 tablet by mouth daily as needed (muscle relaxant) 90 tablet 2    busPIRone (BUSPAR) 10 MG tablet Take 1 tablet by mouth 2 times daily 180 tablet 3    losartan (COZAAR) 50 MG tablet Take 1 tablet by mouth daily 90 tablet 3    pantoprazole (PROTONIX) 40 MG tablet Take 1 tablet by mouth daily 90 tablet 1    Calcium Carbonate (CALCIUM 600 PO) Take by mouth      triamcinolone (KENALOG) 0.1 % ointment Apply topically 2 times daily as needed (eczema) 30 g 1    meloxicam (MOBIC) 15 MG tablet Take 1 tablet by mouth daily 90 tablet 3    metoprolol tartrate (LOPRESSOR) 25 MG tablet TAKE ONE TABLET BY MOUTH TWICE DAILY 180 tablet 3    fexofenadine (ALLEGRA) 180 MG tablet Take 1 tablet by mouth as needed (allergies) 90 tablet 3    vitamin D (CHOLECALCIFEROL) 1000 UNIT TABS tablet Take 1,000 Units by mouth      levothyroxine (SYNTHROID) 150 MCG tablet Take 1 tablet by mouth daily 90 tablet 1    Ascorbic Acid (VITAMIN C) 100 MG tablet Take 500 mg by mouth daily       aspirin EC 81 MG EC tablet Take 1 tablet by mouth daily 30 tablet 3    ferrous sulfate 325 (65 FE) MG tablet Take 325 mg by mouth daily (with breakfast)      calcium-vitamin D (OSCAL-500) 500-200 MG-UNIT per tablet Take 1 tablet by mouth daily Calcium 600 mg supplement 1 tab daily       No current facility-administered medications for this visit. Review of Systems -     General ROS: negative  Psychological ROS: negative  Hematological and Lymphatic ROS: No history of blood clots or bleeding disorder. Respiratory ROS: no cough, shortness of breath, or wheezing  Cardiovascular ROS: no chest pain or dyspnea on exertion  Gastrointestinal ROS: negative  Genito-Urinary ROS: no dysuria, trouble voiding, or hematuria  Musculoskeletal ROS: negative  Neurological ROS: no TIA or stroke symptoms  Dermatological ROS: negative      Blood pressure 117/70, pulse 63, height 5' 5\" (1.651 m), weight 200 lb 6.4 oz (90.9 kg), last menstrual period 02/05/2016.         Physical Examination:    General appearance - alert, well appearing, and in no distress  Mental status - alert, oriented to person, place, and time  Neck - supple, no significant adenopathy, no JVD, or carotid bruits  Chest - clear to auscultation, no wheezes, rales or rhonchi, symmetric air entry  Heart - normal rate, regular rhythm, normal S1, S2, no murmurs, rubs, clicks or gallops  Abdomen - soft, nontender, nondistended, no masses or organomegaly  Neurological - alert, oriented, normal speech, no focal findings or movement disorder noted  Musculoskeletal - no joint tenderness, deformity or swelling  Extremities - peripheral pulses normal, no pedal edema, no clubbing or cyanosis  Skin - normal coloration and turgor, no rashes, no suspicious skin lesions noted    Lab  No results for input(s): CKTOTAL, CKMB, CKMBINDEX, TROPONINI in the last 72 hours.   CBC:   Lab Results   Component Value Date    WBC 5.6 11/21/2020    RBC 4.76 11/21/2020    HGB 14.2 11/21/2020    HCT 44.0 11/21/2020    MCV 92.4 11/21/2020    MCH 29.8 11/21/2020    MCHC 32.3 11/21/2020    RDW 13.1 02/03/2018     11/21/2020    MPV 11.4 11/21/2020     BMP:    Lab Results   Component Value Date     11/21/2020    K 4.1 11/21/2020    K 4.3 07/23/2019     11/21/2020    CO2 28 11/21/2020    BUN 18 11/21/2020    LABALBU 4.3 11/21/2020    CREATININE 0.7 11/21/2020    CALCIUM 10.2 11/21/2020    LABGLOM 87 11/21/2020    GLUCOSE 93 11/21/2020     Hepatic Function Panel:    Lab Results   Component Value Date    ALKPHOS 142 11/21/2020    ALT 14 11/21/2020    AST 15 11/21/2020    PROT 7.4 11/21/2020    BILITOT 0.5 11/21/2020    BILIDIR 0.1 02/14/2013    LABALBU 4.3 11/21/2020     Magnesium:    Lab Results   Component Value Date    MG 2.0 11/21/2020     Warfarin PT/INR:  No components found for: PTPATWAR, PTINRWAR  HgBA1c:    Lab Results   Component Value Date    LABA1C 5.5 08/26/2021     FLP:    Lab Results   Component Value Date    TRIG 40 11/21/2020    HDL 79 11/21/2020    LDLCALC 103 11/21/2020     TSH:    Lab Results   Component Value Date    TSH 1.360 08/23/2021     Conclusions      Summary   The EKG part of the lexiscan stress test is equivocal/Borderline for   ischemia with 0.6 mm ST depression in the inferior and lateral leads(   II,III, AvF, V5 and V6).  Calculated gated LVEF 68 %.   The T.I.D. ratio was 1.2   There is moderate attenuation artifact noted in the inferior wall seems to   be related to bowel artifact.   There was no evidence of definite reversible tracer uptake.   The nuclear images is not suggestive for myocardial ischemia.      Recommendation   Clinical correlation is recommended in view of borderline EKG change.      Signatures      ----------------------------------------------------------------   Electronically signed by Shy Grullon MD (Interpreting   Cardiologist) on 03/08/2017 at 20:20    Echo EF WNL      CONCLUSION: This is a normal sinus rhythm, average heart rate 77 beats per  minute, ranging from  beats per minute. No significant pause of more  than 1.2 seconds noted. Rare ventricular ectopic beats, total of 9, all  isolated. Rare supraventricular ectopic beats, a total of 80, isolated and  supraventricular couplet.     Occasional supraventricular ectopic run, in the triplet and quadruplet pattern  and there are a few triplet and few, 1 quadruplet, the fastest and the longest  composed of 4 beats, rate 132 beats per minute.     There is a correlation most of the time with a palpitation. The patient  stating and is the supraventricular ectopic run particularly with triplets, on  2 times, the patient was complaining of palpitation and the patient was  triplets of supraventricular ectopic beats.     So need further recommendation basically consider beta blocker for suppression  of supraventricular ectopic beats.         Silvino Alfaro M.D.      Echo      Conclusions      Summary   Normal left ventricle size and systolic function. Ejection fraction was   estimated at 65 %. There were no regional left ventricular wall motion   abnormalities and wall thickness was within normal limits.       Signature ----------------------------------------------------------------   Electronically signed by Parveen Guerra MD (Interpreting   physician) on 04/01/2021 at 05:16 PM          Peak HR:160 bpm                      HR response: Appropriate    Peak BP:165/72 mmHg                  BP response: Normal Resting BP with    Predicted HR: 165 bpm                appropriate response to Stress    % of predicted HR: 97                HR/BP product:[id number]    Test duration:8 min                  Max exercise: 10.1 METS    Reason for termination:Physician    request                              Exercise effort:Fair               Conclusions        Summary    Exercise EKG stress test is not suggestive for ischemia.    Nonspecific ST-T wave changes.    The nuclear images is not suggestive for myocardial ischemia.        Signatures        ----------------------------------------------------------------    Electronically signed by Parveen Guerra MD (Interpreting    Cardiologist) on 04/01/2021 at 17:35          D: 04/04/2017    EKG 3/5/18  NSR, no acute abn     EKG 3/11/19  NSR  NO acute abn    ekg 3/15/2021  Sinus  Rhythm   WITHIN NORMAL LIMITS    Assessment     Diagnosis Orders   1. Intermittent palpitations     2. SOB (shortness of breath) on exertion     3. Essential hypertension     4. [facility] Abnormal stress ECG-st depression of 0.6         Plan   The most current meds and labs and records reviewed    Continue the current treatment and with constant vigilance to changes in symptoms and also any potential side effects. Return for care or seek medical attention immediately if symptoms got worse and/or develop new symptoms. Hypertension, on medical treatment. Seems to be under good control. Patient is compliant with medical treatment.      Sob on exertion worse per pat  Fhx of CAD  Echo and Ex nuc- WNL      Palpitation got better with lopressor but still there  Borderline abn ekg stress  Palpitation-better after lopressor   holter 48 -triplet felt as palpitation  Cont  lopressor 25 PO BID  cont asa 81    On anxiety medication  Its dose was increased    Stable and doing well    Losing some wt    patient is advised to exercise 30 min s a day three times a week and about weight loss ,balance diet and     More fruits and vegetables . Discussed use, benefit, and side effects of prescribed medications. All patient questions answered. Pt voiced understanding. Instructed to continue current medications, diet and exercise. Continue risk factor modification and medical management. Patient agreed with treatment plan. Follow up as directed.       RTC 6 months    [facility]

## 2021-09-17 ENCOUNTER — HOSPITAL ENCOUNTER (OUTPATIENT)
Age: 56
Discharge: HOME | End: 2021-09-17
Payer: COMMERCIAL

## 2021-09-17 DIAGNOSIS — R73.09 ELEVATED HEMOGLOBIN A1C: ICD-10-CM

## 2021-09-17 LAB
AVERAGE GLUCOSE: 99 MG/DL (ref 70–126)
CHOLESTEROL, TOTAL: 193 MG/DL (ref 100–199)
HBA1C MFR BLD: 5.3 % (ref 4.4–6.4)
HDLC SERPL-MCNC: 80 MG/DL
LDL CHOLESTEROL CALCULATED: 104 MG/DL
TRIGL SERPL-MCNC: 43 MG/DL (ref 0–199)

## 2021-09-17 PROCEDURE — 80061 LIPID PANEL: CPT

## 2021-09-17 PROCEDURE — 36415 COLL VENOUS BLD VENIPUNCTURE: CPT

## 2021-09-17 PROCEDURE — 83036 HEMOGLOBIN GLYCOSYLATED A1C: CPT

## 2021-09-29 ENCOUNTER — OFFICE VISIT (OUTPATIENT)
Dept: FAMILY MEDICINE CLINIC | Age: 56
End: 2021-09-29
Payer: COMMERCIAL

## 2021-09-29 VITALS
DIASTOLIC BLOOD PRESSURE: 76 MMHG | RESPIRATION RATE: 18 BRPM | BODY MASS INDEX: 33.51 KG/M2 | OXYGEN SATURATION: 98 % | HEART RATE: 60 BPM | TEMPERATURE: 97.3 F | SYSTOLIC BLOOD PRESSURE: 126 MMHG | WEIGHT: 201.4 LBS

## 2021-09-29 DIAGNOSIS — F41.9 ANXIETY: ICD-10-CM

## 2021-09-29 DIAGNOSIS — I10 ESSENTIAL HYPERTENSION: Primary | ICD-10-CM

## 2021-09-29 DIAGNOSIS — E55.9 VITAMIN D DEFICIENCY: ICD-10-CM

## 2021-09-29 DIAGNOSIS — Z23 NEED FOR IMMUNIZATION AGAINST INFLUENZA: ICD-10-CM

## 2021-09-29 PROCEDURE — 90674 CCIIV4 VAC NO PRSV 0.5 ML IM: CPT | Performed by: NURSE PRACTITIONER

## 2021-09-29 PROCEDURE — 90471 IMMUNIZATION ADMIN: CPT | Performed by: NURSE PRACTITIONER

## 2021-09-29 PROCEDURE — 99214 OFFICE O/P EST MOD 30 MIN: CPT | Performed by: NURSE PRACTITIONER

## 2021-09-29 ASSESSMENT — ENCOUNTER SYMPTOMS
CONSTIPATION: 0
ABDOMINAL PAIN: 0
DIARRHEA: 0
SHORTNESS OF BREATH: 0
EYE DISCHARGE: 0
RHINORRHEA: 0
CHEST TIGHTNESS: 0
COUGH: 0
VOMITING: 0

## 2021-09-29 NOTE — PROGRESS NOTES
Clive Isabel [address] [place] [address] [place] [address] [facility]  Dept: [phone]  Dept Fax: [phone]    Visit type: Established patient    Reason for Visit: Discuss Labs (09/17/2021)         Assessment and Plan       1. Essential hypertension  2. Need for immunization against influenza  -     INFLUENZA, MDCK QUADV, 2 YRS AND OLDER, IM, PF, PREFILL SYR OR SDV, 0.5ML (FLUCELVAX QUADV, PF)  3. Vitamin D deficiency  4. Anxiety  no diabetes, a1c WNL  Cholesterol WNL  Due for labs at next apt   Mood controlled with buspar         Return in about 6 months (around 3/29/2022). Subjective       HTN. Controlled. Does follow with winifred    Hx of high trig- recent labs WNL    Hx of thyroid cancer- is following oncology and taking thyroid supplement    Mood is good with buspar     gerd- is taking protonix. No symptoms       Review of Systems   Constitutional: Positive for fatigue (chronic). Negative for activity change, appetite change and fever. HENT: Negative for congestion, ear pain and rhinorrhea. Eyes: Negative for discharge and visual disturbance. Respiratory: Negative for cough, chest tightness and shortness of breath. Cardiovascular: Negative for chest pain and palpitations. Gastrointestinal: Negative for abdominal pain, constipation, diarrhea and vomiting. Genitourinary: Negative for difficulty urinating and hematuria. Musculoskeletal: Negative for arthralgias and myalgias. Skin: Negative for rash. Neurological: Negative for dizziness, weakness, numbness and headaches. Psychiatric/Behavioral: The patient is not nervous/anxious.          Allergies   Allergen Reactions    Morphine Other (See Comments)     \"not sure of reaction\"    Codeine     Seasonal        Outpatient Medications Prior to Visit   Medication Sig Dispense Refill    pantoprazole (PROTONIX) 40 MG tablet TAKE 1 TABLET DAILY 90 tablet 3    betamethasone valerate (VALISONE) 0.1 % ointment Apply topically 2 times daily. 45 g 0    baclofen (LIORESAL) 10 MG tablet Take 1 tablet by mouth daily as needed (muscle relaxant) 90 tablet 2    busPIRone (BUSPAR) 10 MG tablet Take 1 tablet by mouth 2 times daily 180 tablet 3    losartan (COZAAR) 50 MG tablet Take 1 tablet by mouth daily 90 tablet 3    Calcium Carbonate (CALCIUM 600 PO) Take by mouth      triamcinolone (KENALOG) 0.1 % ointment Apply topically 2 times daily as needed (eczema) 30 g 1    meloxicam (MOBIC) 15 MG tablet Take 1 tablet by mouth daily 90 tablet 3    metoprolol tartrate (LOPRESSOR) 25 MG tablet TAKE ONE TABLET BY MOUTH TWICE DAILY 180 tablet 3    fexofenadine (ALLEGRA) 180 MG tablet Take 1 tablet by mouth as needed (allergies) 90 tablet 3    vitamin D (CHOLECALCIFEROL) 1000 UNIT TABS tablet Take 1,000 Units by mouth      levothyroxine (SYNTHROID) 150 MCG tablet Take 1 tablet by mouth daily 90 tablet 1    Ascorbic Acid (VITAMIN C) 100 MG tablet Take 500 mg by mouth daily       aspirin EC 81 MG EC tablet Take 1 tablet by mouth daily 30 tablet 3    ferrous sulfate 325 (65 FE) MG tablet Take 325 mg by mouth daily (with breakfast)      calcium-vitamin D (OSCAL-500) 500-200 MG-UNIT per tablet Take 1 tablet by mouth daily Calcium 600 mg supplement 1 tab daily       No facility-administered medications prior to visit.         Past Medical History:   Diagnosis Date    Anxiety     Arthritis     Hx of blood clots     Hypertension     Primary thyroid cancer ([facility] [address] 10/16/2019    Pure hyperglyceridemia 8/17/2018        Social History     Tobacco Use    Smoking status: Never Smoker    Smokeless tobacco: Never Used   Substance Use Topics    Alcohol use: No        Past Surgical History:   Procedure Laterality Date    HYSTERECTOMY, TOTAL ABDOMINAL  3/31/2016    OTHER SURGICAL HISTORY      mass removed from chest non cancerous    THYROID SURGERY         Family History   Problem Relation Age of Onset    High Blood Pressure Mother     Heart Disease Mother     Osteoporosis Mother     Stroke Father     High Blood Pressure Father     Heart Disease Father     Heart Disease Brother     Heart Disease Brother     Diabetes Maternal Grandmother        Objective       /76 (Site: Right Upper Arm, Position: Sitting, Cuff Size: Large Adult) Comment: manual  Pulse 60   Temp 97.3 °F (36.3 °C) (Temporal)   Resp 18   Wt 201 lb 6.4 oz (91.4 kg)   LMP 02/05/2016   SpO2 98%   BMI 33.51 kg/m²   Physical Exam  Vitals reviewed. Constitutional:       Appearance: She is well-developed. HENT:      Head: Normocephalic and atraumatic. Right Ear: External ear normal.      Left Ear: External ear normal.   Eyes:      Conjunctiva/sclera: Conjunctivae normal.   Neck:      Thyroid: No thyromegaly. Trachea: Trachea normal.   Cardiovascular:      Rate and Rhythm: Normal rate and regular rhythm. Heart sounds: Normal heart sounds. No murmur heard. No friction rub. No gallop. Pulmonary:      Effort: Pulmonary effort is normal.      Breath sounds: Normal breath sounds. Abdominal:      General: Bowel sounds are normal.      Palpations: Abdomen is soft. Tenderness: There is no abdominal tenderness. Musculoskeletal:         General: Normal range of motion. Cervical back: Normal range of motion and neck supple. No spinous process tenderness. Skin:     General: Skin is warm and dry. Findings: No erythema or rash. Neurological:      Mental Status: She is alert and oriented to person, place, and time. Psychiatric:         Speech: Speech normal.         Behavior: Behavior normal.         Thought Content:  Thought content normal.           Data Reviewed and Summarized       Labs:     Imaging/Testing:        Zari Monk, APRN - CNP

## 2021-12-13 ENCOUNTER — HOSPITAL ENCOUNTER (OUTPATIENT)
Age: 56
Discharge: HOME | End: 2021-12-13
Payer: COMMERCIAL

## 2021-12-13 LAB
T4 FREE: 1.62 NG/DL (ref 0.93–1.76)
TSH SERPL DL<=0.05 MIU/L-ACNC: 1.99 UIU/ML (ref 0.4–4.2)

## 2021-12-13 PROCEDURE — 84443 ASSAY THYROID STIM HORMONE: CPT

## 2021-12-13 PROCEDURE — 84439 ASSAY OF FREE THYROXINE: CPT

## 2021-12-13 PROCEDURE — 36415 COLL VENOUS BLD VENIPUNCTURE: CPT

## 2021-12-16 ENCOUNTER — VIRTUAL VISIT (OUTPATIENT)
Dept: FAMILY MEDICINE CLINIC | Age: 56
End: 2021-12-16
Payer: COMMERCIAL

## 2021-12-16 ENCOUNTER — HOSPITAL ENCOUNTER (OUTPATIENT)
Age: 56
Setting detail: SPECIMEN
Discharge: HOME | End: 2021-12-16

## 2021-12-16 ENCOUNTER — TELEPHONE (OUTPATIENT)
Dept: FAMILY MEDICINE CLINIC | Age: 56
End: 2021-12-16

## 2021-12-16 DIAGNOSIS — J20.8 ACUTE BRONCHITIS DUE TO OTHER SPECIFIED ORGANISMS: Primary | ICD-10-CM

## 2021-12-16 PROCEDURE — 99213 OFFICE O/P EST LOW 20 MIN: CPT | Performed by: NURSE PRACTITIONER

## 2021-12-16 RX ORDER — LEVOTHYROXINE SODIUM 125 MCG
1 TABLET ORAL DAILY
COMMUNITY
Start: 2021-10-25

## 2021-12-16 RX ORDER — BENZONATATE 200 MG/1
200 CAPSULE ORAL 3 TIMES DAILY PRN
Qty: 21 CAPSULE | Refills: 0 | Status: SHIPPED | OUTPATIENT
Start: 2021-12-16 | End: 2021-12-23

## 2021-12-16 RX ORDER — AZITHROMYCIN 250 MG/1
TABLET, FILM COATED ORAL
Qty: 6 TABLET | Refills: 0 | Status: SHIPPED | OUTPATIENT
Start: 2021-12-16 | End: 2021-12-29

## 2021-12-16 RX ORDER — LIOTHYRONINE SODIUM 5 UG/1
5 TABLET ORAL 2 TIMES DAILY
COMMUNITY
Start: 2021-10-28

## 2021-12-16 ASSESSMENT — ENCOUNTER SYMPTOMS
DIARRHEA: 0
CONSTIPATION: 0
COUGH: 1
CHEST TIGHTNESS: 0
VOMITING: 0
RHINORRHEA: 1
ABDOMINAL PAIN: 0
EYE DISCHARGE: 0
SHORTNESS OF BREATH: 0

## 2021-12-16 NOTE — TELEPHONE ENCOUNTER
----- Message from Tino Mckenna sent at 12/16/2021  8:13 AM EST -----  Subject: Message to Provider    QUESTIONS  Information for Provider? Pt is experiencing a sore throat, dry cough,   body aches and chills x 3 days . Pt would like something phoned into   [address] Also will need a RTW.   ---------------------------------------------------------------------------  --------------  CALL BACK INFO  What is the best way for the office to contact you? OK to leave message on   voicemail  Preferred Call Back Phone Number? [phone]  ---------------------------------------------------------------------------  --------------  SCRIPT ANSWERS  Relationship to Patient?  Self

## 2021-12-16 NOTE — PROGRESS NOTES
Lenny Saxena (:  1965) is a 55 y.o. female,Established patient, here for evaluation of the following chief complaint(s): Cough (sore throat, body aches x 3 days. ) and Letter for School/Work (work excuse- has been off since yesterday)         ASSESSMENT/PLAN:  1. Acute bronchitis due to other specified organisms  -     azithromycin (ZITHROMAX Z-ELIZABET) 250 MG tablet; 2 tablets day 1 then1 tablet days 2 - 5., Disp-6 tablet, R-0Normal  -     COVID-19; Future    If positive results then return on - if covid negative then can return to work at that time   Rest, increase fluids lucy water  mucinex    Vit d, vit c, vit b12, zinc  Prone  Tylenol or motrin   Return if symptoms worsen or fail to improve. SUBJECTIVE/OBJECTIVE:  Daysi Feliciano symptoms  Onset 4 days / monday  Throat pain, body aches and cough   Called off work the last 2 days, works at [address] [employer]   Constitutional: Negative for activity change, appetite change and fever. HENT: Positive for congestion and rhinorrhea. Negative for ear pain. Eyes: Negative for discharge and visual disturbance. Respiratory: Positive for cough. Negative for chest tightness and shortness of breath. Cardiovascular: Negative for chest pain and palpitations. Gastrointestinal: Negative for abdominal pain, constipation, diarrhea and vomiting. Genitourinary: Negative for difficulty urinating and hematuria. Musculoskeletal: Negative for arthralgias and myalgias. Skin: Negative for rash. Neurological: Negative for dizziness, weakness, numbness and headaches. Psychiatric/Behavioral: The patient is not nervous/anxious. No flowsheet data found.      Physical Exam    [INSTRUCTIONS:  \"[x]\" Indicates a positive item  \"[]\" Indicates a negative item  -- DELETE ALL ITEMS NOT EXAMINED]    Constitutional: [x] Appears well-developed and well-nourished [x] No apparent distress      [] Abnormal -     Mental status: [x] Alert and awake  [x] Oriented to person/place/time [x] Able to follow commands    [] Abnormal -     Eyes:   EOM    [x]  Normal    [] Abnormal -   Sclera  [x]  Normal    [] Abnormal -          Discharge [x]  None visible   [] Abnormal -     HENT: [x] Normocephalic, atraumatic  [] Abnormal -   [x] Mouth/Throat: Mucous membranes are moist    External Ears [x] Normal  [] Abnormal -    Neck: [x] No visualized mass [] Abnormal -     Pulmonary/Chest: [x] Respiratory effort normal   [x] No visualized signs of difficulty breathing or respiratory distress        [] Abnormal -      Musculoskeletal:   [x] Normal gait with no signs of ataxia         [x] Normal range of motion of neck        [] Abnormal -     Neurological:        [x] No Facial Asymmetry (Cranial nerve 7 motor function) (limited exam due to video visit)          [x] No gaze palsy        [] Abnormal -          Skin:        [x] No significant exanthematous lesions or discoloration noted on facial skin         [] Abnormal -            Psychiatric:       [x] Normal Affect [] Abnormal -        [x] No Hallucinations    Other pertinent observable physical exam findings:-                Shahriar Byrd, was evaluated through a synchronous (real-time) audio-video encounter. The patient (or guardian if applicable) is aware that this is a billable service. Verbal consent to proceed has been obtained within the past 12 months. The visit was conducted pursuant to the emergency declaration under the [address] [place] [address], [address] [place] [address] authority and the [employer] and [employer] General Act. Patient identification was verified, and a caregiver was present when appropriate. The patient was located in a state where the provider was credentialed to provide care.       An electronic signature was used to authenticate this note.    --Cornelio Jacobs, YOBANY - CNP

## 2021-12-17 DIAGNOSIS — J20.8 ACUTE BRONCHITIS DUE TO OTHER SPECIFIED ORGANISMS: ICD-10-CM

## 2021-12-17 LAB
SARS-COV-2: NORMAL
SARS-COV-2: NOT DETECTED
SOURCE: NORMAL

## 2021-12-29 ENCOUNTER — HOSPITAL ENCOUNTER (EMERGENCY)
Age: 56
Discharge: HOME | End: 2021-12-29
Payer: COMMERCIAL

## 2021-12-29 VITALS
DIASTOLIC BLOOD PRESSURE: 69 MMHG | TEMPERATURE: 98.1 F | HEART RATE: 68 BPM | HEIGHT: 65 IN | BODY MASS INDEX: 33.32 KG/M2 | SYSTOLIC BLOOD PRESSURE: 134 MMHG | OXYGEN SATURATION: 98 % | RESPIRATION RATE: 16 BRPM | WEIGHT: 200 LBS

## 2021-12-29 DIAGNOSIS — Z20.822 CLOSE EXPOSURE TO COVID-19 VIRUS: Primary | ICD-10-CM

## 2021-12-29 PROCEDURE — 87636 SARSCOV2 & INF A&B AMP PRB: CPT

## 2021-12-29 PROCEDURE — 99213 OFFICE O/P EST LOW 20 MIN: CPT

## 2021-12-29 PROCEDURE — 99213 OFFICE O/P EST LOW 20 MIN: CPT | Performed by: NURSE PRACTITIONER

## 2021-12-29 ASSESSMENT — ENCOUNTER SYMPTOMS
EYE REDNESS: 0
SHORTNESS OF BREATH: 0
ABDOMINAL PAIN: 0
SINUS PRESSURE: 0
VOMITING: 0
SORE THROAT: 0
EYE ITCHING: 0
COUGH: 0
CHEST TIGHTNESS: 0
DIARRHEA: 0
NAUSEA: 0

## 2021-12-29 NOTE — LETTER
[facility]  [address]  [address]  Phone: [phone]               December 29, 2021    Patient: Quinton Moreno   YOB: 1965   Date of Visit: 12/29/2021       To Whom It May Concern:    Daniel Turner was seen and treated in our emergency department on 12/29/2021. COVID-19 test pending and result will be known within 24 hours. Patient must isolate until test result is known. If patient is COVID-19 positive:    Patient will be required to quarantine according to the Health Department or [facility] [address] instructions based on the CDC guidelines and may not return to school/work until quarantine has been completed. Patient will have to follow with their family provider for additional school/work note and/or [place] paperwork needs.  .    Sincerely,       Signature:_Electronically signed by YOBANY Almaguer CNP on 12/29/2021 at 4:18 PM  ________________________________

## 2021-12-29 NOTE — ED PROVIDER NOTES
[address] Encounter      CHIEFCOMPLAINT       Chief Complaint   Patient presents with    Covid Testing       Nurses Notes reviewed and I agree except as noted in the HPI. HISTORY OF PRESENT ILLNESS   Lila Whalen is a 55 y.o. female who presents for evaluation COVID-19 testing. At the present time of evaluation she denies signs or symptoms of illness. The patient/patient representative has no other acute complaints at this time. REVIEW OF SYSTEMS     Review of Systems   Constitutional: Negative for chills, fatigue and fever. HENT: Negative for congestion, ear pain, sinus pressure and sore throat. Eyes: Negative for redness and itching. Respiratory: Negative for cough, chest tightness and shortness of breath. Cardiovascular: Negative for chest pain. Gastrointestinal: Negative for abdominal pain, diarrhea, nausea and vomiting. Skin: Negative for rash. Allergic/Immunologic: Negative for environmental allergies and food allergies. Neurological: Negative for headaches. Hematological: Negative for adenopathy. PAST MEDICAL HISTORY         Diagnosis Date    Anxiety     Arthritis     Hx of blood clots     Hypertension     Primary thyroid cancer ([facility] [address] 10/16/2019    Pure hyperglyceridemia 8/17/2018       SURGICAL HISTORY     Patient  has a past surgical history that includes Hysterectomy, total abdominal (3/31/2016); Thyroid surgery; and other surgical history. CURRENT MEDICATIONS       Previous Medications    ASCORBIC ACID (VITAMIN C) 100 MG TABLET    Take 500 mg by mouth daily     ASPIRIN EC 81 MG EC TABLET    Take 1 tablet by mouth daily    BACLOFEN (LIORESAL) 10 MG TABLET    Take 1 tablet by mouth daily as needed (muscle relaxant)    BETAMETHASONE VALERATE (VALISONE) 0.1 % OINTMENT    Apply topically 2 times daily.     BUSPIRONE (BUSPAR) 10 MG TABLET    Take 1 tablet by mouth 2 times daily    CALCIUM CARBONATE (CALCIUM 600 PO)    Take by mouth    CALCIUM-VITAMIN D (OSCAL-500) 500-200 MG-UNIT PER TABLET    Take 1 tablet by mouth daily Calcium 600 mg supplement 1 tab daily    FERROUS SULFATE 325 (65 FE) MG TABLET    Take 325 mg by mouth daily (with breakfast)    FEXOFENADINE (ALLEGRA) 180 MG TABLET    Take 1 tablet by mouth as needed (allergies)    LIOTHYRONINE (CYTOMEL) 5 MCG TABLET    Take 1.5 tablets by mouth daily    LOSARTAN (COZAAR) 50 MG TABLET    Take 1 tablet by mouth daily    MELOXICAM (MOBIC) 15 MG TABLET    Take 1 tablet by mouth daily    METOPROLOL TARTRATE (LOPRESSOR) 25 MG TABLET    TAKE ONE TABLET BY MOUTH TWICE DAILY    PANTOPRAZOLE (PROTONIX) 40 MG TABLET    TAKE 1 TABLET DAILY    SYNTHROID 125 MCG TABLET    Take 1 tablet by mouth daily    TRIAMCINOLONE (KENALOG) 0.1 % OINTMENT    Apply topically 2 times daily as needed (eczema)    VITAMIN D (CHOLECALCIFEROL) 1000 UNIT TABS TABLET    Take 1,000 Units by mouth       ALLERGIES     Patient is is allergic to morphine, codeine, and seasonal.    FAMILY HISTORY     Patient'sfamily history includes Diabetes in her maternal grandmother; Heart Disease in her brother, brother, father, and mother; High Blood Pressure in her father and mother; Osteoporosis in her mother; Stroke in her father. SOCIAL HISTORY     Patient  reports that she has never smoked. She has never used smokeless tobacco. She reports that she does not drink alcohol and does not use drugs. PHYSICAL EXAM     ED TRIAGE VITALS  BP: 134/69, Temp: 98.1 °F (36.7 °C), Pulse: 68, Resp: 16, SpO2: 98 %  Physical Exam  Vitals and nursing note reviewed. Constitutional:       General: She is not in acute distress. Appearance: Normal appearance. She is well-developed and well-groomed. HENT:      Head: Normocephalic and atraumatic. Right Ear: External ear normal.      Left Ear: External ear normal.      Nose: Nose normal.      Mouth/Throat:      Lips: Pink.       Mouth: Mucous membranes are moist.   Eyes: Conjunctiva/sclera: Conjunctivae normal.      Right eye: Right conjunctiva is not injected. Left eye: Left conjunctiva is not injected. Pupils: Pupils are equal.   Cardiovascular:      Rate and Rhythm: Normal rate. Heart sounds: Normal heart sounds. Pulmonary:      Effort: Pulmonary effort is normal. No respiratory distress. Breath sounds: Normal breath sounds. Musculoskeletal:      Cervical back: Normal range of motion. Skin:     General: Skin is warm and dry. Findings: No rash (on exposed surfaces). Neurological:      Mental Status: She is alert and oriented to person, place, and time. Psychiatric:         Mood and Affect: Mood normal.         Speech: Speech normal.         Behavior: Behavior is cooperative. DIAGNOSTIC RESULTS   Labs:  Abnormal Labs Reviewed - No abnormal labs to display     IMAGING:  No orders to display     URGENT CARE COURSE:     Vitals:    12/29/21 1613   BP: 134/69   Pulse: 68   Resp: 16   Temp: 98.1 °F (36.7 °C)   TempSrc: Temporal   SpO2: 98%   Weight: 200 lb (90.7 kg)   Height: 5' 5\" (1.651 m)       Medications - No data to display  PROCEDURES:  FINALIMPRESSION      1. Close exposure to COVID-19 virus        DISPOSITION/PLAN   DISPOSITION Decision To Discharge 12/29/2021 04:16:56 PM      ED Course as of 12/29/21 1625   Wed Dec 29, 2021   [address] [place] [address]      ED Course User Index  [facility]       Physical assessment findings, diagnostic testing(s) if applicable, and vital signs reviewed with patient/patient representative. Differential diagnosis(s) discussed with patient/patient representative. Prescription medications and/or over-the-counter medications for symptom management discussed. Patient is to follow-up with family care provider in 2-3 days if no improvement. Patient is to go to the emergency department if symptoms change/worsen.   Patient/patient representative is aware of care plan, questions answered, verbalizes understanding and is in agreement. Printed instructions attached to after visit summary. If COVID-19 positive or COVID-19 by PCR is pending at time of discharge patient is to quarantine/isolate according to [facility] guidelines. Problem List Items Addressed This Visit     None      Visit Diagnoses     Close exposure to COVID-19 virus    -  Primary            PATIENT REFERRED TO:  YOBANY Segal CNP  [address]  [address] [facility]  [phone]    Schedule an appointment as soon as possible for a visit in 3 days  as needed, For further evaluation. , If symptoms change/worsen, go to the [phone] [facility], APRN - CNP    Please note that some or all of this chart was generated using [employer] voice recognition software. Although every effort was made to ensure the accuracy of this automated transcription, some errors in transcription may have occurred.         YOBANY Enciso CNP  12/29/21 0389

## 2021-12-30 LAB
INFLUENZA A: NOT DETECTED
INFLUENZA B: NOT DETECTED
SARS-COV-2 RNA, RT PCR: NOT DETECTED

## 2022-02-15 DIAGNOSIS — M19.90 ARTHRITIS: ICD-10-CM

## 2022-02-15 RX ORDER — MELOXICAM 15 MG/1
TABLET ORAL
Qty: 90 TABLET | Refills: 3 | Status: ON HOLD | OUTPATIENT
Start: 2022-02-15 | End: 2022-07-03 | Stop reason: HOSPADM

## 2022-03-07 ENCOUNTER — OFFICE VISIT (OUTPATIENT)
Dept: CARDIOLOGY CLINIC | Age: 57
End: 2022-03-07
Payer: COMMERCIAL

## 2022-03-07 VITALS
BODY MASS INDEX: 34.09 KG/M2 | DIASTOLIC BLOOD PRESSURE: 64 MMHG | HEART RATE: 63 BPM | SYSTOLIC BLOOD PRESSURE: 113 MMHG | HEIGHT: 65 IN | WEIGHT: 204.6 LBS

## 2022-03-07 DIAGNOSIS — R00.2 INTERMITTENT PALPITATIONS: ICD-10-CM

## 2022-03-07 DIAGNOSIS — R94.39 ABNORMAL STRESS ECG: ICD-10-CM

## 2022-03-07 DIAGNOSIS — R06.02 SOB (SHORTNESS OF BREATH) ON EXERTION: ICD-10-CM

## 2022-03-07 DIAGNOSIS — I10 ESSENTIAL HYPERTENSION: Primary | ICD-10-CM

## 2022-03-07 PROCEDURE — 99214 OFFICE O/P EST MOD 30 MIN: CPT | Performed by: INTERNAL MEDICINE

## 2022-03-07 PROCEDURE — 93000 ELECTROCARDIOGRAM COMPLETE: CPT | Performed by: INTERNAL MEDICINE

## 2022-03-07 NOTE — PROGRESS NOTES
Chief Complaint   Patient presents with    6 Month Follow-Up   originally  patient  Here for abnormal EKG and cp        Patient here for 6 month follow up  EKG done today    Occasional palpitations- stable and better    Sob on exertion -mainly on stairs -chronic unchanged     Denied Chest pain,  Dizziness, edema   No N/v/d      FHX  Dad's first heart attck was when 63years old. One brother was 62 when he had heart attack and passed away and   her other brother was 54 when he had his heart attack and he is still living.   Patricia Velasquez had CABG at age 56        nevere smoked      Patient Active Problem List   Diagnosis    Anxiety    Fibroid, uterine    Intermittent palpitations    Family history of premature CAD-father and two brother had mi at age 62   Jenny Harvey [facility] Abnormal stress ECG-st depression of 0.6    Essential hypertension    Seasonal allergies    Osteopenia after menopause    Class 1 obesity due to excess calories with serious comorbidity and body mass index (BMI) of 33.0 to 33.9 in adult    Primary thyroid cancer (HCC)    Papillary thyroid carcinoma (HCC)    Non-toxic multinodular goiter    SOB (shortness of breath) on exertion    Chronic right shoulder pain    Osteoarthritis of pelvis    Vitamin D deficiency    Hearing loss of left ear       Past Surgical History:   Procedure Laterality Date    HYSTERECTOMY, TOTAL ABDOMINAL  3/31/2016    OTHER SURGICAL HISTORY      mass removed from chest non cancerous    THYROID SURGERY         Allergies   Allergen Reactions    Morphine Other (See Comments)     \"not sure of reaction\"    Codeine     Seasonal         Family History   Problem Relation Age of Onset    High Blood Pressure Mother     Heart Disease Mother     Osteoporosis Mother     Stroke Father     High Blood Pressure Father     Heart Disease Father     Heart Disease Brother     Heart Disease Brother     Diabetes Maternal Grandmother         Social History     Socioeconomic History  Marital status:      Spouse name: Se Land Number of children: Not on file    Years of education: Not on file    Highest education level: Not on file   Occupational History    Not on file   Tobacco Use    Smoking status: Never Smoker    Smokeless tobacco: Never Used   Vaping Use    Vaping Use: Never used   Substance and Sexual Activity    Alcohol use: No    Drug use: No    Sexual activity: Yes     Partners: Male   Other Topics Concern    Not on file   Social History Narrative    Not on file     Social Determinants of Health     Financial Resource Strain: Low Risk     Difficulty of Paying Living Expenses: Not hard at all   Food Insecurity: No Food Insecurity    Worried About [facility] in the Last Year: Never true    Brenden of Food in the Last Year: Never true   Transportation Needs: No Transportation Needs    Lack of Transportation (Medical): No    Lack of Transportation (Non-Medical):  No   Physical Activity:     Days of Exercise per Week: Not on file    Minutes of Exercise per Session: Not on file   Stress:     Feeling of Stress : Not on file   Social Connections:     Frequency of Communication with Friends and Family: Not on file    Frequency of Social Gatherings with Friends and Family: Not on file    Attends Church Services: Not on file    Active Member of [address] [place] [address] Visual Threat or Organizations: Not on file    Attends Club or Organization Meetings: Not on file    Marital Status: Not on file   Intimate Partner Violence:     Fear of Current or Ex-Partner: Not on file    Emotionally Abused: Not on file    Physically Abused: Not on file    Sexually Abused: Not on file   Housing Stability:     Unable to Pay for Housing in the Last Year: Not on file    Number of [place] in the Last Year: Not on file    Unstable Housing in the Last Year: Not on file       Current Outpatient Medications   Medication Sig Dispense Refill    meloxicam (MOBIC) 15 MG tablet TAKE 1 TABLET DAILY 90 tablet 3    metoprolol tartrate (LOPRESSOR) 25 MG tablet TAKE 1 TABLET TWICE A  tablet 0    SYNTHROID 125 MCG tablet Take 1 tablet by mouth daily      liothyronine (CYTOMEL) 5 MCG tablet Take 1.5 tablets by mouth daily      pantoprazole (PROTONIX) 40 MG tablet TAKE 1 TABLET DAILY 90 tablet 3    betamethasone valerate (VALISONE) 0.1 % ointment Apply topically 2 times daily. 45 g 0    baclofen (LIORESAL) 10 MG tablet Take 1 tablet by mouth daily as needed (muscle relaxant) 90 tablet 2    busPIRone (BUSPAR) 10 MG tablet Take 1 tablet by mouth 2 times daily 180 tablet 3    losartan (COZAAR) 50 MG tablet Take 1 tablet by mouth daily 90 tablet 3    Calcium Carbonate (CALCIUM 600 PO) Take by mouth      triamcinolone (KENALOG) 0.1 % ointment Apply topically 2 times daily as needed (eczema) 30 g 1    fexofenadine (ALLEGRA) 180 MG tablet Take 1 tablet by mouth as needed (allergies) 90 tablet 3    vitamin D (CHOLECALCIFEROL) 1000 UNIT TABS tablet Take 1,000 Units by mouth      Ascorbic Acid (VITAMIN C) 100 MG tablet Take 500 mg by mouth daily       aspirin EC 81 MG EC tablet Take 1 tablet by mouth daily 30 tablet 3    ferrous sulfate 325 (65 FE) MG tablet Take 325 mg by mouth daily (with breakfast)      calcium-vitamin D (OSCAL-500) 500-200 MG-UNIT per tablet Take 1 tablet by mouth daily Calcium 600 mg supplement 1 tab daily       No current facility-administered medications for this visit. Review of Systems -     General ROS: negative  Psychological ROS: negative  Hematological and Lymphatic ROS: No history of blood clots or bleeding disorder.    Respiratory ROS: no cough, shortness of breath, or wheezing  Cardiovascular ROS: no chest pain or dyspnea on exertion  Gastrointestinal ROS: negative  Genito-Urinary ROS: no dysuria, trouble voiding, or hematuria  Musculoskeletal ROS: negative  Neurological ROS: no TIA or stroke symptoms  Dermatological ROS: negative      Blood pressure 113/64, pulse 63, height 5' 5\" (1.651 m), weight 204 lb 9.6 oz (92.8 kg), last menstrual period 02/05/2016. Physical Examination:    General appearance - alert, well appearing, and in no distress  Mental status - alert, oriented to person, place, and time  Neck - supple, no significant adenopathy, no JVD, or carotid bruits  Chest - clear to auscultation, no wheezes, rales or rhonchi, symmetric air entry  Heart - normal rate, regular rhythm, normal S1, S2, no murmurs, rubs, clicks or gallops  Abdomen - soft, nontender, nondistended, no masses or organomegaly  Neurological - alert, oriented, normal speech, no focal findings or movement disorder noted  Musculoskeletal - no joint tenderness, deformity or swelling  Extremities - peripheral pulses normal, no pedal edema, no clubbing or cyanosis  Skin - normal coloration and turgor, no rashes, no suspicious skin lesions noted    Lab  No results for input(s): CKTOTAL, CKMB, CKMBINDEX, TROPONINI in the last 72 hours.   CBC:   Lab Results   Component Value Date    WBC 5.6 11/21/2020    RBC 4.76 11/21/2020    HGB 14.2 11/21/2020    HCT 44.0 11/21/2020    MCV 92.4 11/21/2020    MCH 29.8 11/21/2020    MCHC 32.3 11/21/2020    RDW 13.1 02/03/2018     11/21/2020    MPV 11.4 11/21/2020     BMP:    Lab Results   Component Value Date     11/21/2020    K 4.1 11/21/2020    K 4.3 07/23/2019     11/21/2020    CO2 28 11/21/2020    BUN 18 11/21/2020    LABALBU 4.3 11/21/2020    CREATININE 0.7 11/21/2020    CALCIUM 10.2 11/21/2020    LABGLOM 87 11/21/2020    GLUCOSE 93 11/21/2020     Hepatic Function Panel:    Lab Results   Component Value Date    ALKPHOS 142 11/21/2020    ALT 14 11/21/2020    AST 15 11/21/2020    PROT 7.4 11/21/2020    BILITOT 0.5 11/21/2020    BILIDIR 0.1 02/14/2013    LABALBU 4.3 11/21/2020     Magnesium:    Lab Results   Component Value Date    MG 2.0 11/21/2020     Warfarin PT/INR:  No components found for: PTPATWAR, PTINRWAR  HgBA1c:    Lab Results Component Value Date    LABA1C 5.3 09/17/2021     FLP:    Lab Results   Component Value Date    TRIG 43 09/17/2021    HDL 80 09/17/2021    LDLCALC 104 09/17/2021     TSH:    Lab Results   Component Value Date    TSH 1.990 12/13/2021     Conclusions      Summary   The EKG part of the lexiscan stress test is equivocal/Borderline for   ischemia with 0.6 mm ST depression in the inferior and lateral leads(   II,III, AvF, V5 and V6).  Calculated gated LVEF 68 %.   The T.I.D. ratio was 1.2   There is moderate attenuation artifact noted in the inferior wall seems to   be related to bowel artifact.   There was no evidence of definite reversible tracer uptake.   The nuclear images is not suggestive for myocardial ischemia.      Recommendation   Clinical correlation is recommended in view of borderline EKG change.      Signatures      ----------------------------------------------------------------   Electronically signed by Opal Dorman MD (Interpreting   Cardiologist) on 03/08/2017 at 20:20    Echo EF WNL      CONCLUSION: This is a normal sinus rhythm, average heart rate 77 beats per  minute, ranging from  beats per minute. No significant pause of more  than 1.2 seconds noted. Rare ventricular ectopic beats, total of 9, all  isolated. Rare supraventricular ectopic beats, a total of 80, isolated and  supraventricular couplet.     Occasional supraventricular ectopic run, in the triplet and quadruplet pattern  and there are a few triplet and few, 1 quadruplet, the fastest and the longest  composed of 4 beats, rate 132 beats per minute.     There is a correlation most of the time with a palpitation.  The patient  stating and is the supraventricular ectopic run particularly with triplets, on  2 times, the patient was complaining of palpitation and the patient was  triplets of supraventricular ectopic beats.     So need further recommendation basically consider beta blocker for suppression  of supraventricular ectopic beats.         Penny Archer M.D.      Echo      Conclusions      Summary   Normal left ventricle size and systolic function. Ejection fraction was   estimated at 65 %. There were no regional left ventricular wall motion   abnormalities and wall thickness was within normal limits. Signature      ----------------------------------------------------------------   Electronically signed by Geeta Davis MD (Interpreting   physician) on 04/01/2021 at 05:16 PM          Peak HR:160 bpm                      HR response: Appropriate    Peak BP:165/72 mmHg                  BP response: Normal Resting BP with    Predicted HR: 165 bpm                appropriate response to Stress    % of predicted HR: 97                HR/BP product:35238    Test duration:8 min                  Max exercise: 10.1 METS    Reason for termination:Physician    request                              Exercise effort:Fair               Conclusions        Summary    Exercise EKG stress test is not suggestive for ischemia.    Nonspecific ST-T wave changes.    The nuclear images is not suggestive for myocardial ischemia.        Signatures        ----------------------------------------------------------------    Electronically signed by Geeta Davis MD (Interpreting    Cardiologist) on 04/01/2021 at 17:35          D: 04/04/2017    EKG 3/5/18  NSR, no acute abn     EKG 3/11/19  NSR  NO acute abn    ekg 3/15/2021  Sinus  Rhythm   WITHIN NORMAL LIMITS    ekg 3/7/21  Sinus  Rhythm   -  Nonspecific T-abnormality. ABNORMAL       Assessment     Diagnosis Orders   1. Essential hypertension  EKG 12 lead   2. Intermittent palpitations     3. SOB (shortness of breath) on exertion  EKG 12 lead   4. Boderline Abnormal stress ECG-st depression of 0.6         Plan   The  current meds and labs and records reviewed    Continue the current treatment and with constant vigilance to changes in symptoms and also any potential side effects.   Return for care or seek medical attention immediately if symptoms got worse and/or develop new symptoms. Hypertension, on medical treatment. Seems to be under good control. Patient is compliant with medical treatment. Sob on exertion worse per pat  Fhx of CAD  Echo and Ex nuc- WNL    Palpitation got better with lopressor but still there  Borderline abn ekg stress  Palpitation-better after lopressor   holter 48 -triplet felt as palpitation  Cont  lopressor 25 PO BID  cont asa 81    On anxiety medication  Its dose was increased    Overall pat Stable and doing well    patient is advised to exercise 30 min s a day three times a week and about weight loss ,balance diet and     More fruits and vegetables . Discussed use, benefit, and side effects of prescribed medications. All patient questions answered. Pt voiced understanding. Instructed to continue current medications, diet and exercise. Continue risk factor modification and medical management. Patient agreed with treatment plan. Follow up as directed.       RTC 12 months    Khadra Fierro, [facility]

## 2022-03-28 DIAGNOSIS — I10 ESSENTIAL HYPERTENSION: ICD-10-CM

## 2022-03-28 RX ORDER — LOSARTAN POTASSIUM 50 MG/1
TABLET ORAL
Qty: 90 TABLET | Refills: 3 | Status: SHIPPED | OUTPATIENT
Start: 2022-03-28

## 2022-04-09 ENCOUNTER — HOSPITAL ENCOUNTER (OUTPATIENT)
Age: 57
Discharge: HOME | End: 2022-04-09
Payer: COMMERCIAL

## 2022-04-09 LAB — TSH SERPL DL<=0.05 MIU/L-ACNC: 0.69 UIU/ML (ref 0.4–4.2)

## 2022-04-09 PROCEDURE — 36415 COLL VENOUS BLD VENIPUNCTURE: CPT

## 2022-04-09 PROCEDURE — 84443 ASSAY THYROID STIM HORMONE: CPT

## 2022-04-20 ENCOUNTER — HOSPITAL ENCOUNTER (OUTPATIENT)
Age: 57
Discharge: HOME | End: 2022-04-20
Payer: COMMERCIAL

## 2022-04-20 LAB — AFP-TUMOR MARKER: 2.4 UG/L

## 2022-04-20 PROCEDURE — 86800 THYROGLOBULIN ANTIBODY: CPT

## 2022-04-20 PROCEDURE — 36415 COLL VENOUS BLD VENIPUNCTURE: CPT

## 2022-04-20 PROCEDURE — 84432 ASSAY OF THYROGLOBULIN: CPT

## 2022-04-20 PROCEDURE — 82105 ALPHA-FETOPROTEIN SERUM: CPT

## 2022-04-23 ENCOUNTER — HOSPITAL ENCOUNTER (OUTPATIENT)
Age: 57
Discharge: HOME | End: 2022-04-23

## 2022-04-27 LAB — THYROGLOBULIN: < 0.5 NG/ML (ref 1.3–31.8)

## 2022-05-04 DIAGNOSIS — F41.9 ANXIETY: ICD-10-CM

## 2022-05-04 RX ORDER — BUSPIRONE HYDROCHLORIDE 10 MG/1
TABLET ORAL
Qty: 180 TABLET | Refills: 3 | Status: SHIPPED | OUTPATIENT
Start: 2022-05-04 | End: 2022-05-09 | Stop reason: SDUPTHER

## 2022-05-09 ENCOUNTER — OFFICE VISIT (OUTPATIENT)
Dept: FAMILY MEDICINE CLINIC | Age: 57
End: 2022-05-09
Payer: COMMERCIAL

## 2022-05-09 VITALS
OXYGEN SATURATION: 98 % | DIASTOLIC BLOOD PRESSURE: 66 MMHG | BODY MASS INDEX: 34.68 KG/M2 | HEART RATE: 70 BPM | SYSTOLIC BLOOD PRESSURE: 112 MMHG | WEIGHT: 208.4 LBS | TEMPERATURE: 98.2 F | RESPIRATION RATE: 16 BRPM

## 2022-05-09 DIAGNOSIS — G89.29 CHRONIC LEFT-SIDED LOW BACK PAIN WITHOUT SCIATICA: ICD-10-CM

## 2022-05-09 DIAGNOSIS — I10 ESSENTIAL HYPERTENSION: ICD-10-CM

## 2022-05-09 DIAGNOSIS — M51.36 DEGENERATION OF LUMBAR INTERVERTEBRAL DISC: ICD-10-CM

## 2022-05-09 DIAGNOSIS — M54.50 CHRONIC LEFT-SIDED LOW BACK PAIN WITHOUT SCIATICA: ICD-10-CM

## 2022-05-09 DIAGNOSIS — M47.817 LUMBOSACRAL SPONDYLOSIS WITHOUT MYELOPATHY: Primary | ICD-10-CM

## 2022-05-09 DIAGNOSIS — J30.2 SEASONAL ALLERGIES: ICD-10-CM

## 2022-05-09 DIAGNOSIS — F41.9 ANXIETY: ICD-10-CM

## 2022-05-09 PROBLEM — M51.369 DEGENERATION OF LUMBAR INTERVERTEBRAL DISC: Status: ACTIVE | Noted: 2022-05-09

## 2022-05-09 PROBLEM — R29.818 NEUROGENIC CLAUDICATION: Status: ACTIVE | Noted: 2022-05-09

## 2022-05-09 PROBLEM — G95.19 NEUROGENIC CLAUDICATION (HCC): Status: ACTIVE | Noted: 2022-05-09

## 2022-05-09 PROBLEM — M43.10 ACQUIRED SPONDYLOLISTHESIS: Status: ACTIVE | Noted: 2022-05-09

## 2022-05-09 PROCEDURE — 99214 OFFICE O/P EST MOD 30 MIN: CPT | Performed by: NURSE PRACTITIONER

## 2022-05-09 RX ORDER — BACLOFEN 10 MG/1
10 TABLET ORAL DAILY PRN
Qty: 90 TABLET | Refills: 2 | Status: SHIPPED | OUTPATIENT
Start: 2022-05-09

## 2022-05-09 RX ORDER — BUSPIRONE HYDROCHLORIDE 10 MG/1
TABLET ORAL
Qty: 180 TABLET | Refills: 3 | Status: SHIPPED | OUTPATIENT
Start: 2022-05-09

## 2022-05-09 ASSESSMENT — PATIENT HEALTH QUESTIONNAIRE - PHQ9
2. FEELING DOWN, DEPRESSED OR HOPELESS: 0
SUM OF ALL RESPONSES TO PHQ9 QUESTIONS 1 & 2: 0
1. LITTLE INTEREST OR PLEASURE IN DOING THINGS: 0
SUM OF ALL RESPONSES TO PHQ QUESTIONS 1-9: 0

## 2022-05-09 NOTE — PROGRESS NOTES
Bogdan Bauer [address] [facility]. [facility] [address]  Dept: [phone]  Dept Fax: [phone]    Visit type: Established patient    Reason for Visit: Hypertension (7 month follow up)         Assessment and Plan       1. Lumbosacral spondylosis without myelopathy  2. Anxiety  -     busPIRone (BUSPAR) 10 MG tablet; TAKE 1 TABLET TWICE A DAY, Disp-180 tablet, R-3Normal  3. Chronic left-sided low back pain without sciatica  -     baclofen (LIORESAL) 10 MG tablet; Take 1 tablet by mouth daily as needed (muscle relaxant), Disp-90 tablet, R-2Normal  4. Degeneration of lumbar intervertebral disc  5. Essential hypertension  -     CBC with Auto Differential; Future  -     Comprehensive Metabolic Panel, Fasting; Future  -     Hemoglobin A1C; Future  -     Lipid Panel; Future  -     Magnesium; Future  -     TSH with Reflex; Future  -     Vitamin D 25 Hydroxy; Future  6. Seasonal allergies  7. BMI 34.0-34.9,adult  -     CBC with Auto Differential; Future  -     Comprehensive Metabolic Panel, Fasting; Future  -     Hemoglobin A1C; Future  -     Lipid Panel; Future  -     Magnesium; Future  -     TSH with Reflex; Future  -     Vitamin D 25 Hydroxy; Future    Can get shingles vaccine at [facility]   Is going to schedule mammogram   Can order wellness labs  Stretching  ADA/ AHA Diet and increase exercise   Return in about 6 months (around 11/9/2022) for Anual exam- labs 1 week prior . Subjective       HTN. Controlled. No CP or palpitations. No SOB with exertion Does follow with winifred    Hx of high trig- recent labs WNL    Hx of thyroid cancer- is following oncology and taking thyroid supplement    Mood is good with buspar. No difficulties with sleeping or changes of appetite.      Back pain  Specialist Noelle Colon  Is having jj placed in lumbar spine  Is taking baclofen as needed     gerd  Onset years ago   is taking protonix  Specialist- naun  Had colonoscopy in the past but not EGD Review of Systems   Constitutional: Positive for fatigue (chronic). Negative for activity change, appetite change and fever. HENT: Negative for congestion, ear pain and rhinorrhea. Eyes: Negative for discharge and visual disturbance. Respiratory: Negative for cough, chest tightness and shortness of breath. Cardiovascular: Negative for chest pain and palpitations. Gastrointestinal: Negative for abdominal pain, constipation, diarrhea and vomiting. Genitourinary: Negative for difficulty urinating and hematuria. Musculoskeletal: Positive for arthralgias and myalgias. Skin: Negative for rash. Neurological: Negative for dizziness, weakness, numbness and headaches. Psychiatric/Behavioral: The patient is not nervous/anxious. Allergies   Allergen Reactions    Morphine Other (See Comments)     \"not sure of reaction\"    Codeine     Seasonal        Outpatient Medications Prior to Visit   Medication Sig Dispense Refill    losartan (COZAAR) 50 MG tablet TAKE 1 TABLET DAILY 90 tablet 3    meloxicam (MOBIC) 15 MG tablet TAKE 1 TABLET DAILY 90 tablet 3    metoprolol tartrate (LOPRESSOR) 25 MG tablet TAKE 1 TABLET TWICE A  tablet 0    SYNTHROID 125 MCG tablet Take 1 tablet by mouth daily      liothyronine (CYTOMEL) 5 MCG tablet Take 1.5 tablets by mouth daily      pantoprazole (PROTONIX) 40 MG tablet TAKE 1 TABLET DAILY 90 tablet 3    betamethasone valerate (VALISONE) 0.1 % ointment Apply topically 2 times daily.  45 g 0    Calcium Carbonate (CALCIUM 600 PO) Take by mouth      triamcinolone (KENALOG) 0.1 % ointment Apply topically 2 times daily as needed (eczema) 30 g 1    fexofenadine (ALLEGRA) 180 MG tablet Take 1 tablet by mouth as needed (allergies) 90 tablet 3    vitamin D (CHOLECALCIFEROL) 1000 UNIT TABS tablet Take 1,000 Units by mouth      Ascorbic Acid (VITAMIN C) 100 MG tablet Take 500 mg by mouth daily       aspirin EC 81 MG EC tablet Take 1 tablet by mouth daily 30 tablet 3    ferrous sulfate 325 (65 FE) MG tablet Take 325 mg by mouth daily (with breakfast)      calcium-vitamin D (OSCAL-500) 500-200 MG-UNIT per tablet Take 1 tablet by mouth daily Calcium 600 mg supplement 1 tab daily      busPIRone (BUSPAR) 10 MG tablet TAKE 1 TABLET TWICE A  tablet 3    baclofen (LIORESAL) 10 MG tablet Take 1 tablet by mouth daily as needed (muscle relaxant) 90 tablet 2     No facility-administered medications prior to visit. Past Medical History:   Diagnosis Date    Anxiety     Arthritis     Hx of blood clots     Hypertension     Primary thyroid cancer ([facility] [address] 10/16/2019    Pure hyperglyceridemia 8/17/2018        Social History     Tobacco Use    Smoking status: Never Smoker    Smokeless tobacco: Never Used   Substance Use Topics    Alcohol use: No        Past Surgical History:   Procedure Laterality Date    HYSTERECTOMY, TOTAL ABDOMINAL  3/31/2016    OTHER SURGICAL HISTORY      mass removed from chest non cancerous    THYROID SURGERY         Family History   Problem Relation Age of Onset    High Blood Pressure Mother     Heart Disease Mother     Osteoporosis Mother     Stroke Father     High Blood Pressure Father     Heart Disease Father     Heart Disease Brother     Heart Disease Brother     Diabetes Maternal Grandmother        Objective       /66 (Site: Left Upper Arm, Position: Sitting, Cuff Size: Large Adult) Comment: manual  Pulse 70   Temp 98.2 °F (36.8 °C) (Temporal)   Resp 16   Wt 208 lb 6.4 oz (94.5 kg)   LMP 02/05/2016   SpO2 98%   BMI 34.68 kg/m²   Physical Exam  Vitals reviewed. Constitutional:       Appearance: She is well-developed. HENT:      Head: Normocephalic and atraumatic. Right Ear: External ear normal.      Left Ear: External ear normal.   Eyes:      Conjunctiva/sclera: Conjunctivae normal.   Neck:      Thyroid: No thyromegaly.       Trachea: Trachea normal.   Cardiovascular:      Rate and Rhythm: Normal rate and regular rhythm. Heart sounds: Normal heart sounds. No murmur heard. No friction rub. No gallop. Pulmonary:      Effort: Pulmonary effort is normal.      Breath sounds: Normal breath sounds. Abdominal:      General: Bowel sounds are normal.      Palpations: Abdomen is soft. Tenderness: There is no abdominal tenderness. Musculoskeletal:         General: Normal range of motion. Cervical back: Normal range of motion and neck supple. No spinous process tenderness. Skin:     General: Skin is warm and dry. Findings: No erythema or rash. Neurological:      Mental Status: She is alert and oriented to person, place, and time. Psychiatric:         Speech: Speech normal.         Behavior: Behavior normal.         Thought Content:  Thought content normal.           Data Reviewed and Summarized       Labs:     Imaging/Testing:        Malcom Wolf, YOBANY - CNP

## 2022-05-17 ASSESSMENT — ENCOUNTER SYMPTOMS
RHINORRHEA: 0
COUGH: 0
EYE DISCHARGE: 0
ABDOMINAL PAIN: 0
CHEST TIGHTNESS: 0
CONSTIPATION: 0
DIARRHEA: 0
VOMITING: 0
SHORTNESS OF BREATH: 0

## 2022-06-01 ENCOUNTER — TELEPHONE (OUTPATIENT)
Dept: CARDIOLOGY CLINIC | Age: 57
End: 2022-06-01

## 2022-06-01 NOTE — TELEPHONE ENCOUNTER
Pre op Risk Assessment    Procedure Back Surgery  Physician Dr. Juarez Michelle  Date of surgery/procedure 7-1-22    Last OV 3-7-22  Last Stress 4-1-21  Last Echo 4-1-21  Last Cath None in Epic  Last Stent None in Epic  Is patient on blood thinners ASA  Hold Meds/how many days ?     Fax: [phone]

## 2022-06-06 NOTE — PROGRESS NOTES
PAT appointment reminder call  Arrival time and location given 6/13 at 10:30 in PAT  Bring drivers license and insurance  Bring list of medications with dosage and frequency  If possible bring caregiver for appointment  Appointment may last 2 hours

## 2022-06-13 ENCOUNTER — HOSPITAL ENCOUNTER (OUTPATIENT)
Dept: GENERAL RADIOLOGY | Age: 57
Discharge: HOME | End: 2022-06-13
Payer: COMMERCIAL

## 2022-06-13 ENCOUNTER — HOSPITAL ENCOUNTER (OUTPATIENT)
Dept: PREADMISSION TESTING | Age: 57
Discharge: HOME | End: 2022-06-17
Payer: COMMERCIAL

## 2022-06-13 VITALS
HEIGHT: 65 IN | SYSTOLIC BLOOD PRESSURE: 134 MMHG | DIASTOLIC BLOOD PRESSURE: 64 MMHG | OXYGEN SATURATION: 98 % | WEIGHT: 207.23 LBS | TEMPERATURE: 98.3 F | RESPIRATION RATE: 16 BRPM | HEART RATE: 56 BPM | BODY MASS INDEX: 34.53 KG/M2

## 2022-06-13 DIAGNOSIS — Z01.818 PRE-OP TESTING: ICD-10-CM

## 2022-06-13 LAB
ANION GAP SERPL CALCULATED.3IONS-SCNC: 10 MEQ/L (ref 8–16)
APTT: 28.6 SECONDS (ref 22–38)
BUN BLDV-MCNC: 19 MG/DL (ref 7–22)
CALCIUM SERPL-MCNC: 9.7 MG/DL (ref 8.5–10.5)
CHLORIDE BLD-SCNC: 103 MEQ/L (ref 98–111)
CO2: 27 MEQ/L (ref 23–33)
CREAT SERPL-MCNC: 0.7 MG/DL (ref 0.4–1.2)
ERYTHROCYTE [DISTWIDTH] IN BLOOD BY AUTOMATED COUNT: 12.8 % (ref 11.5–14.5)
ERYTHROCYTE [DISTWIDTH] IN BLOOD BY AUTOMATED COUNT: 44.2 FL (ref 35–45)
GFR SERPL CREATININE-BSD FRML MDRD: 86 ML/MIN/1.73M2
GLUCOSE BLD-MCNC: 89 MG/DL (ref 70–108)
HCT VFR BLD CALC: 44.3 % (ref 37–47)
HEMOGLOBIN: 14.1 GM/DL (ref 12–16)
INR BLD: 0.98 (ref 0.85–1.13)
MCH RBC QN AUTO: 29.6 PG (ref 26–33)
MCHC RBC AUTO-ENTMCNC: 31.8 GM/DL (ref 32.2–35.5)
MCV RBC AUTO: 93.1 FL (ref 81–99)
MRSA NASAL SCREEN RT-PCR: NEGATIVE
PLATELET # BLD: 217 THOU/MM3 (ref 130–400)
PMV BLD AUTO: 11 FL (ref 9.4–12.4)
POTASSIUM SERPL-SCNC: 4.1 MEQ/L (ref 3.5–5.2)
RBC # BLD: 4.76 MILL/MM3 (ref 4.2–5.4)
SODIUM BLD-SCNC: 140 MEQ/L (ref 135–145)
STAPH AUREUS SCREEN RT-PCR: NEGATIVE
WBC # BLD: 5.9 THOU/MM3 (ref 4.8–10.8)

## 2022-06-13 PROCEDURE — 85730 THROMBOPLASTIN TIME PARTIAL: CPT

## 2022-06-13 PROCEDURE — 87641 MR-STAPH DNA AMP PROBE: CPT

## 2022-06-13 PROCEDURE — 36415 COLL VENOUS BLD VENIPUNCTURE: CPT

## 2022-06-13 PROCEDURE — 80048 BASIC METABOLIC PNL TOTAL CA: CPT

## 2022-06-13 PROCEDURE — 85027 COMPLETE CBC AUTOMATED: CPT

## 2022-06-13 PROCEDURE — 85610 PROTHROMBIN TIME: CPT

## 2022-06-13 PROCEDURE — 71046 X-RAY EXAM CHEST 2 VIEWS: CPT

## 2022-06-13 PROCEDURE — 87640 STAPH A DNA AMP PROBE: CPT

## 2022-06-13 ASSESSMENT — PAIN DESCRIPTION - ORIENTATION
ORIENTATION_2: RIGHT
ORIENTATION: MID;LOWER

## 2022-06-13 ASSESSMENT — PAIN DESCRIPTION - INTENSITY: RATING_2: 1

## 2022-06-13 ASSESSMENT — PAIN SCALES - GENERAL: PAINLEVEL_OUTOF10: 1

## 2022-06-13 ASSESSMENT — PAIN DESCRIPTION - LOCATION
LOCATION_2: HIP
LOCATION: BACK

## 2022-06-13 ASSESSMENT — PAIN DESCRIPTION - PAIN TYPE: TYPE: CHRONIC PAIN

## 2022-06-13 ASSESSMENT — PAIN DESCRIPTION - ONSET: ONSET: ON-GOING

## 2022-06-13 ASSESSMENT — PAIN - FUNCTIONAL ASSESSMENT
PAIN_FUNCTIONAL_ASSESSMENT: PREVENTS OR INTERFERES WITH ALL ACTIVE AND SOME PASSIVE ACTIVITIES
PAIN_FUNCTIONAL_ASSESSMENT_SITE2: PREVENTS OR INTERFERES SOME ACTIVE ACTIVITIES AND ADLS

## 2022-06-13 ASSESSMENT — PAIN DESCRIPTION - FREQUENCY: FREQUENCY: INTERMITTENT

## 2022-06-13 ASSESSMENT — PAIN DESCRIPTION - DESCRIPTORS
DESCRIPTORS_2: ACHING
DESCRIPTORS: ACHING

## 2022-06-13 NOTE — PROGRESS NOTES
Preliminary Discharge Planning Questionnaire  Date of Surgery 7/1/22   Surgeon Candance Given      · Having the proper help and care after surgery is very important to your recovery. Who will be able to help you at home when you are discharged from the hospital? ([facility] - 24/7 for a minimum of 2 weeks)    spouse    Name(s) Jose Luis Rosales    · How many steps to enter your home? 1    · Bathroom on first floor? Yes    · Bedroom on the first floor? No    · Do you have an elevated toilet seat to use at home? No    · Do you have a walker to use at home? · Total Joints - with wheels NA  · Spine - with wheels  No     Have you been doing home exercises? no    *You will go home with some outpatient physical therapy, where do you prefer to go? [facility]    *If needed, what home health agency would you like to use?   [facility]    *Cardiac Rehab plans NA

## 2022-06-13 NOTE — PROGRESS NOTES
areas.   Rinse your body thoroughly. This is very important.  Using a fresh, clean towel, dry your body.  Dress in freshly washed clothes.  Do not use lotions, powders, or creams after this shower. _6/30_______ (date)   SECOND SHOWER: The day before surgery: Take a shower and wash your entire body, including your hair and scalp in the following manner:   Wash your hair using normal shampoo. Make sure you rinse the shampoo from your hair and body. Wash your face with your regular soap or cleanser.  Use one of the sponges in the [facility] kit and apply 1/3 of the Chlorhexidine soap to the sponge, wash from your neck down. This is very important. Do not use the soap on your face or hair and avoid private areas.  Rinse your body thoroughly. This is very important.  Using a fresh, clean towel, dry your body.  Dress in freshly washed clothes.  Fresh clean sheets and pillow cases should be used after this shower.  Do not use lotions, powders, or creams after this shower. _7/1_______ (date)   THE FINAL SHOWER: The morning of surgery: Take a shower and wash your entire body, including your hair and scalp in the following manner:   Wash your hair using normal shampoo. Make sure you rinse the shampoo from your hair and body. Wash your face with your regular soap or cleanser.  Use one of the sponges in the [facility] kit and apply 1/3 of the Chlorhexidine soap to the sponge, wash from your neck down. This is very important. Do not use the soap on your face or hair and avoid private areas.  Rinse your body thoroughly. This is very important.  Using a fresh, clean towel, dry your body.  Dress warmly with freshly washed clean clothes. Keeping warm before surgery decreases your risk of developing and infection.

## 2022-06-13 NOTE — PROGRESS NOTES
Keeping You Safe for Surgery    Staphylococcus aureus or Elaine Herman is a germ that lives on the skin and in the nose of some healthy people. Your skin protects you from those germs. However, when you have surgery, we will be cutting your skin. Sometimes germs can get into those cuts and cause infection. How do we screen for Staph? We will swab your nose to see if you are a carrier of Staph. The results will be available about 2 hours after we obtain the nasal specimen. A positive test does not mean you have an infection; it just means you are a carrier of Staph. Your surgery most likely will not be canceled or delayed. If my test is positive, what happens? If your test is positive, a nurse will call you and tell you to get Mupirocin Ointment (Bactroban) at your pharmacy. The medicine may come in one large tube or may come in many small packets. When the medication is administered into your nose, it works by killing some of the germs that could cause you to get a surgical site infection.  If you get a big tube of Mupirocin, place enough medicine to cover the tip of a cotton swab (Q-tip). Place the cotton swab inside one nostril and rub the medicine onto the inside of the nose. Then repeat this same procedure in your other nostril.  If you get small individual tubes, put half the tube on a cotton swab and put the medicine in one nostril. Then the other half in the other nostril. After the medication has been inserted into each nostril, gently press your nose together and release for about a minute to get the medicine all over the inside of your nose. Do this once in the morning and once at night for 5 days prior to your scheduled surgery date. If I have Staph, will I be treated differently in the hospital?  If you have a certain type of Staph called MRSA (Methicillin-Resistant Staph aureus), you will be in a single room on Contact Precautions.  This means your doctors and nurses will wear gloves and gowns when taking care of you. We do this (along with good hand hygiene) to make sure we do not spread MRSA to any another patients in our care. SURGERY PREPARATION CHECKLIST     NAME: ________Scarlett Byrd________________________________     DATE OF SURGERY: ____7/1/22________________________    Enter Dates, Check (?) circles to indicate task is completed. Date MUPIROCIN NASAL OINTMENT BODY CLEANSING     DAY 1 _______6/26_______________   Morning    Evening          Day 2 _______6/27_______________   Morning     Evening        Day 3 _______6/28_______________   Morning  Evening        Day 4 ______6/29________________   Morning    Evening        Day 5 ______6/30________________   Morning  Evening        Day 6 ________7/1______________  (Day of Surgery)               PLEASE COMPLETE and BRING THIS CHECKLIST WITH YOU TO [address] to give to your nurse on the day of surgery. You will be notified if you need to use Mupirocin Nasal Ointment.

## 2022-06-13 NOTE — PROGRESS NOTES
William Enrique denies pain right now,  as she is off work and not on her feet today. She has low back pain which aches and burns and radiates up to her middle back at times especially when she is on her feet all day at work. At night she also has right hip pain radiating to her right knee described as a bad ache.

## 2022-06-20 ENCOUNTER — OFFICE VISIT (OUTPATIENT)
Dept: CARDIOLOGY CLINIC | Age: 57
End: 2022-06-20
Payer: COMMERCIAL

## 2022-06-20 VITALS
HEART RATE: 73 BPM | DIASTOLIC BLOOD PRESSURE: 70 MMHG | SYSTOLIC BLOOD PRESSURE: 120 MMHG | BODY MASS INDEX: 35.72 KG/M2 | WEIGHT: 214.4 LBS | HEIGHT: 65 IN

## 2022-06-20 DIAGNOSIS — Z01.810 PREOP CARDIOVASCULAR EXAM: Primary | ICD-10-CM

## 2022-06-20 PROCEDURE — 93000 ELECTROCARDIOGRAM COMPLETE: CPT | Performed by: INTERNAL MEDICINE

## 2022-06-20 PROCEDURE — 99214 OFFICE O/P EST MOD 30 MIN: CPT | Performed by: INTERNAL MEDICINE

## 2022-06-20 NOTE — PROGRESS NOTES
[id number] [facility] [address]  [place] [address]  Dept: [phone]  Dept Fax: [phone]  Loc: [phone]    Visit Date: 6/20/2022    Ms. Lamont Knutson is a 65 y.o. female  who presented for:  Chief Complaint   Patient presents with    Cardiac Clearance     back surgery with Dr. Miguel Angel Wallace scheduled 7-1-22 (Dr. Kyle Sellers Patient)       HPI:   66 yo F c hx of HTN , Dr. Kyle Sellers pateint , who is here for preop risk stratification for back surgery. The surgery  Is scheduled for 7/1/22. Denies any chest pain, sob, palpitations, lightheadedness, dizziness, orthopnea, PND or pedal edema. Report good exercise tolerance. Echo done last year showed normal LVSF EF 60%, Stress test in 2019 showed no ischemia or infarction.           Current Outpatient Medications:     calcium carbonate-vitamin D3 (CALCIUM 600+D3) 600-400 MG-UNIT TABS per tab, Take 1 tablet by mouth daily, Disp: , Rfl:     Cholecalciferol (VITAMIN D3) 125 MCG (5000 UT) TABS, Take 1 tablet by mouth daily, Disp: , Rfl:     Ascorbic Acid (VITAMIN C) 500 MG CHEW, Take 1 tablet by mouth daily, Disp: , Rfl:     metoprolol tartrate (LOPRESSOR) 25 MG tablet, TAKE 1 TABLET TWICE A DAY, Disp: 180 tablet, Rfl: 3    busPIRone (BUSPAR) 10 MG tablet, TAKE 1 TABLET TWICE A DAY, Disp: 180 tablet, Rfl: 3    baclofen (LIORESAL) 10 MG tablet, Take 1 tablet by mouth daily as needed (muscle relaxant), Disp: 90 tablet, Rfl: 2    losartan (COZAAR) 50 MG tablet, TAKE 1 TABLET DAILY (Patient taking differently: Take 50 mg by mouth nightly ), Disp: 90 tablet, Rfl: 3    meloxicam (MOBIC) 15 MG tablet, TAKE 1 TABLET DAILY, Disp: 90 tablet, Rfl: 3    SYNTHROID 125 MCG tablet, Take 1 tablet by mouth daily, Disp: , Rfl:     liothyronine (CYTOMEL) 5 MCG tablet, Take 5 mcg by mouth 2 times daily 1 tablet AM, .5 tab PM, Disp: , Rfl:     pantoprazole (PROTONIX) 40 MG tablet, TAKE 1 TABLET DAILY, Disp: 90 tablet, Rfl: 3    betamethasone valerate (VALISONE) 0.1 % ointment, Apply topically 2 times daily. (Patient taking differently: 2 times daily as needed Apply topically 2 times daily.), Disp: 45 g, Rfl: 0    triamcinolone (KENALOG) 0.1 % ointment, Apply topically 2 times daily as needed (eczema), Disp: 30 g, Rfl: 1    fexofenadine (ALLEGRA) 180 MG tablet, Take 1 tablet by mouth as needed (allergies) (Patient taking differently: Take 180 mg by mouth daily ), Disp: 90 tablet, Rfl: 3    aspirin EC 81 MG EC tablet, Take 1 tablet by mouth daily, Disp: 30 tablet, Rfl: 3    ferrous sulfate 325 (65 FE) MG tablet, Take 325 mg by mouth daily (with breakfast), Disp: , Rfl:     Past Medical History  Lizzy Quick  has a past medical history of Anxiety, Arthritis, History of palpitations in adulthood, Hx of blood clots, Hypertension, Primary thyroid cancer ([facility] [address] Pure hyperglyceridemia, Tumor of retina, and Unspecified hearing loss, left ear. Social History  Lizzy Quick  reports that she has never smoked. She has never used smokeless tobacco. She reports that she does not drink alcohol and does not use drugs. Family History  Lizzy Quick family history includes Diabetes in her maternal grandmother; Heart Disease in her brother, brother, father, and mother; High Blood Pressure in her father and mother; Osteoporosis in her mother; Stroke in her father. Past Surgical History   Past Surgical History:   Procedure Laterality Date    HYSTERECTOMY, TOTAL ABDOMINAL (CERVIX REMOVED)  3/31/2016    OTHER SURGICAL HISTORY  08/04/2021    mass removed from chest non cancerous- [facility] Dr. Cristo Coronel THYROID SURGERY         Subjective:     REVIEW OF SYSTEMS  Constitutional: denies sweats, chills and fever  HENT: denies  congestion, sinus pressure, sneezing and sore throat. Eyes: denies  pain, discharge, redness and itching.    Respiratory: denies apnea, cough  Gastrointestinal: denies blood in stool, constipation, diarrhea   Endocrine: denies cold intolerance, heat intolerance, polydipsia. Genitourinary: denies dysuria, enuresis, flank pain and hematuria. Musculoskeletal: denies arthralgias, joint swelling and neck pain. Neurological: denies numbness and headaches. Psychiatric/Behavioral: denies agitation, confusion, decreased concentration and dysphoric mood    All others reviewed and are negative. Objective:     /70   Pulse 73   Ht 5' 5\" (1.651 m)   Wt 214 lb 6.4 oz (97.3 kg)   LMP 02/05/2016   BMI 35.68 kg/m²     Wt Readings from Last 3 Encounters:   06/20/22 214 lb 6.4 oz (97.3 kg)   06/13/22 207 lb 3.7 oz (94 kg)   05/09/22 208 lb 6.4 oz (94.5 kg)     BP Readings from Last 3 Encounters:   06/20/22 120/70   06/13/22 134/64   05/09/22 112/66       PHYSICAL EXAM  Constitutional: Oriented to person, place, and time. Appears well-developed and well-nourished. HENT:   Head: Normocephalic and atraumatic. Eyes: EOM are normal. Pupils are equal, round, and reactive to light. Neck: Normal range of motion. Neck supple. No JVD present. Cardiovascular: Normal rate , normal heart sounds and intact distal pulses. Pulmonary/Chest: Effort normal and breath sounds normal. No respiratory distress. No wheezes. No rales. Abdominal: Soft. Bowel sounds are normal. No distension. There is no tenderness. Musculoskeletal: Normal range of motion. No edema. Neurological: Alert and oriented to person, place, and time. No cranial nerve deficit. Coordination normal.   Skin: Skin is warm and dry. Psychiatric: Normal mood and affect.        No results found for: CKTOTAL, CKMB, CKMBINDEX    Lab Results   Component Value Date    WBC 5.9 06/13/2022    RBC 4.76 06/13/2022    HGB 14.1 06/13/2022    HCT 44.3 06/13/2022    MCV 93.1 06/13/2022    MCH 29.6 06/13/2022    MCHC 31.8 06/13/2022    RDW 13.1 02/03/2018     06/13/2022    MPV 11.0 06/13/2022       Lab Results   Component Value Date     06/13/2022    K 4.1 06/13/2022    K 4.3 07/23/2019  06/13/2022    CO2 27 06/13/2022    BUN 19 06/13/2022    LABALBU 4.3 11/21/2020    CREATININE 0.7 06/13/2022    CALCIUM 9.7 06/13/2022    LABGLOM 86 06/13/2022    GLUCOSE 89 06/13/2022       Lab Results   Component Value Date    ALKPHOS 142 11/21/2020    ALT 14 11/21/2020    AST 15 11/21/2020    PROT 7.4 11/21/2020    BILITOT 0.5 11/21/2020    BILIDIR 0.1 02/14/2013    LABALBU 4.3 11/21/2020       Lab Results   Component Value Date    MG 2.0 11/21/2020       Lab Results   Component Value Date    INR 0.98 06/13/2022    INR 0.92 07/22/2019    INR 2.20 (H) 08/05/2013         Lab Results   Component Value Date    LABA1C 5.3 09/17/2021       Lab Results   Component Value Date    TRIG 43 09/17/2021    HDL 80 09/17/2021    LDLCALC 104 09/17/2021       Lab Results   Component Value Date    TSH 0.693 04/09/2022         Testing Reviewed:      I haveindividually reviewed the below cardiac tests    EKG:    ECHO: Results for orders placed during the hospital encounter of 04/01/21    ECHO Complete 2D W Doppler W Color    Narrative  Transthoracic Echocardiography Report (TTE)    Demographics    Patient Name    Rakesh Howard Gender               Female    MR #            [id number]       Race                     Ethnicity    Account #       [de-identified]       Room Number    Accession       [id number]      Date of Study        04/01/2021  Number    Date of Birth   1965      Referring Physician  Rebecca Navas CNP    Age             55 year(s)      Alex Charles RDCS    Interpreting         Rebecca Gustafson MD  Physician    Procedure    Type of Study    TTE procedure:ECHOCARDIOGRAM COMPLETE 2D W DOPPLER W COLOR. Procedure Date  Date: 04/01/2021 Start: 06:51 AM    Study Location: Echo Lab  Technical Quality: Adequate visualization    Indications:Shortness of breath.     Additional Medical History:Palpitations, Hypertension, Chest Pain, Thyroid  disease, Shortness of breath, Osteoarthritis, Family history of coronary  artery disease. Patient Status: Routine    Height: 65 inches Weight: 215 pounds BSA: 2.04 m^2 BMI: 35.78 kg/m^2    BP: 131/77 mmHg    Conclusions    Summary  Normal left ventricle size and systolic function. Ejection fraction was  estimated at 65 %. There were no regional left ventricular wall motion  abnormalities and wall thickness was within normal limits. Signature    ----------------------------------------------------------------  Electronically signed by Parveen Guerra MD (Interpreting  physician) on 04/01/2021 at 05:16 PM  ----------------------------------------------------------------    Findings    Mitral Valve  The mitral valve structure was normal with normal leaflet separation. DOPPLER: The transmitral velocity was within the normal range with no  evidence for mitral stenosis. Mild mitral regurgitation is present. Aortic Valve  The aortic valve was trileaflet with normal thickness and cuspal  separation. DOPPLER: Transaortic velocity was within the normal range with  no evidence of aortic stenosis. There was no evidence of aortic  regurgitation. Tricuspid Valve  The tricuspid valve structure was normal with normal leaflet separation. DOPPLER: There was no evidence of tricuspid stenosis. Mild tricuspid regurgitation visualized. Pulmonic Valve  The pulmonic valve leaflets exhibited normal thickness, no calcification,  and normal cuspal separation. DOPPLER: The transpulmonic velocity was  within the normal range with no evidence for regurgitation. Left Atrium  Left atrial size was normal.    Left Ventricle  Normal left ventricle size and systolic function. Ejection fraction was  estimated at 65 %. There were no regional left ventricular wall motion  abnormalities and wall thickness was within normal limits.     Right Atrium  Right atrial size was normal.    Right Ventricle  The right ventricular size was normal with normal systolic function and  wall thickness. Pericardial Effusion  The pericardium was normal in appearance with no evidence of a pericardial  effusion. Pleural Effusion  No evidence of pleural effusion. Aorta / Great Vessels  -Aortic root dimension within normal limits.  -The Pulmonary artery is within normal limits. -IVC size is within normal limits with normal respiratory phasic changes.     M-Mode/2D Measurements & Calculations    LV Diastolic    LV Systolic Dimension: 3  AV Cusp Separation: 1.8 cmLA  Dimension: 4.8  cm                        Dimension: 3.8 cmAO Root  cm              LV Volume Diastolic: 150  Dimension: 2.6 cmLA Area: 16.1  LV FS:37.5 %    ml                        cm^2  LV PW           LV Volume Systolic: 35 ml  Diastolic: 1 cm LV EDV/LV EDV Index: 108  Septum          ml/53 m^2LV ESV/LV ESV  Diastolic: 1.1  Index: 35 BW/80 m^2       RV Diastolic Dimension: 3.1 cm  cm              EF Calculated: 67.6 %  LA/Aorta: 1.46  Ascending Aorta: 3 cm  LA volume/Index: 40.2 ml /20m^2    Doppler Measurements & Calculations    MV Peak E-Wave: 92.1 cm/s  AV Peak Velocity: 163  LVOT Peak Velocity: 125  MV Peak A-Wave: 54 cm/s    cm/s                   cm/s  MV E/A Ratio: 1.71         AV Peak Gradient:      LVOT Peak Gradient: 6  MV Peak Gradient: 3.39     10.63 mmHg             mmHg  mmHg  TV Peak E-Wave: 62.1  MV Deceleration Time: 229                         cm/s  msec                                              TV Peak A-Wave: 46.7  IVRT: 81 msec          cm/s    MV E' Septal Velocity: 7.2                        TV Peak Gradient: 1.54  cm/s                       AV DVI (Vmax):0.77     mmHg  MV A' Septal Velocity:                            TR Velocity:246 cm/s  11.1 cm/s                                         TR Gradient:24.21 mmHg  MV E' Lateral Velocity:                           PV Peak Velocity: 66  12.4 cm/s                                         cm/s  MV A' Lateral Velocity:                           PV Peak Gradient: 1.74  7.7 cm/s                                          mmHg  E/E' septal: 12.79  E/E' lateral: 7.43  MN ED Velocity: 96.4  cm/s    [email or web address] [id number]      STRESS:    CATH:    Assessment/Plan       Diagnosis Orders   1. Preop cardiovascular exam       Preop risk stratification for back surgery  HTN    Denies any cardiac symptoms  Reviewed EKG  Reviewed prior echo and stress test  Reports good exercise tolerance  No furthe rworkup is needed from cardiac standpoint   May proceed with scheduled surgery  Cardiac status stable. The patient is asked to make an attempt to improve diet and exercise patterns to aid in medical management of this problem. Advised more plant based nutrition/meditarrean diet   Advised patient to call office or seek immediate medical attention if there is any new onset of  any chest pain, sob, palpitations, lightheadedness, dizziness, orthopnea, PND or pedal edema. All medication side effects were discussed in details. Thank youfor allowing me to participate in the care of this patient. Please do not hesitate to contact me for any further questions. Return if symptoms worsen or fail to improve, for Review testing, Regular follow up.        Electronically signed by Diamond Phoenix, MD [facility]  6/20/2022 at 3:45 PM EDT

## 2022-06-22 ENCOUNTER — OFFICE VISIT (OUTPATIENT)
Dept: FAMILY MEDICINE CLINIC | Age: 57
End: 2022-06-22
Payer: COMMERCIAL

## 2022-06-22 VITALS
OXYGEN SATURATION: 96 % | BODY MASS INDEX: 35.11 KG/M2 | WEIGHT: 211 LBS | RESPIRATION RATE: 16 BRPM | DIASTOLIC BLOOD PRESSURE: 74 MMHG | SYSTOLIC BLOOD PRESSURE: 120 MMHG | HEART RATE: 74 BPM | TEMPERATURE: 96.9 F

## 2022-06-22 DIAGNOSIS — L20.84 INTRINSIC ECZEMA: ICD-10-CM

## 2022-06-22 DIAGNOSIS — Z01.818 PRE-OPERATIVE CLEARANCE: Primary | ICD-10-CM

## 2022-06-22 DIAGNOSIS — M48.061 SPINAL STENOSIS OF LUMBAR REGION, UNSPECIFIED WHETHER NEUROGENIC CLAUDICATION PRESENT: ICD-10-CM

## 2022-06-22 PROCEDURE — 99213 OFFICE O/P EST LOW 20 MIN: CPT | Performed by: NURSE PRACTITIONER

## 2022-06-22 ASSESSMENT — PATIENT HEALTH QUESTIONNAIRE - PHQ9
SUM OF ALL RESPONSES TO PHQ QUESTIONS 1-9: 0
SUM OF ALL RESPONSES TO PHQ QUESTIONS 1-9: 0
2. FEELING DOWN, DEPRESSED OR HOPELESS: 0
SUM OF ALL RESPONSES TO PHQ9 QUESTIONS 1 & 2: 0
1. LITTLE INTEREST OR PLEASURE IN DOING THINGS: 0
SUM OF ALL RESPONSES TO PHQ QUESTIONS 1-9: 0
SUM OF ALL RESPONSES TO PHQ QUESTIONS 1-9: 0

## 2022-06-22 ASSESSMENT — ENCOUNTER SYMPTOMS
CONSTIPATION: 0
VOMITING: 0
COUGH: 0
RHINORRHEA: 0
EYE DISCHARGE: 0
DIARRHEA: 0
CHEST TIGHTNESS: 0
SHORTNESS OF BREATH: 0
ABDOMINAL PAIN: 0
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
## 2022-06-28 NOTE — FLOWSHEET NOTE
Follow all instructions given by your physician    NPO after midnight   Sips of water am of surgery with allowed medications  Bring insurance info and 's license  Wear comfortable clean clothing  No jewelry or contact lenses to be worn day of surgery  No glue on dentures morning of surgery;you will be asked to remove them for surgery. Case for glasses. Shower night before and morning of surgery with a liquid antibacterial soap, dry with fresh clean towel; no lotions, creams or powder. Clean sheets and pillow case on bed night before surgery  Bring medications in original bottles  Bring CPAP/BIPAP machine if you have one ( you may be charged if one is needed in recovery room )   needed at discharge   Report to [facility] on 2nd floor  If you would become ill prior to surgery, please call the surgeon  May have a visitor with you, we request that you limit to 2 visitors in pre-op area  Please bring and wear mask  Call [phone] for any questions  Covid questionnaire Complete; Patient negative for symptoms or exposure. See documentation.

## 2022-06-30 ENCOUNTER — ANESTHESIA EVENT (OUTPATIENT)
Dept: OPERATING ROOM | Age: 57
DRG: 460 | End: 2022-06-30
Payer: COMMERCIAL
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
## 2022-07-01 ENCOUNTER — ANESTHESIA (OUTPATIENT)
Dept: OPERATING ROOM | Age: 57
DRG: 460 | End: 2022-07-01
Payer: COMMERCIAL

## 2022-07-01 ENCOUNTER — APPOINTMENT (OUTPATIENT)
Dept: GENERAL RADIOLOGY | Age: 57
DRG: 460 | End: 2022-07-01
Attending: ORTHOPAEDIC SURGERY
Payer: COMMERCIAL

## 2022-07-01 ENCOUNTER — HOSPITAL ENCOUNTER (INPATIENT)
Age: 57
LOS: 2 days | Discharge: HOME HEALTH | DRG: 460 | End: 2022-07-03
Attending: ORTHOPAEDIC SURGERY | Admitting: ORTHOPAEDIC SURGERY
Payer: COMMERCIAL

## 2022-07-01 PROBLEM — M48.062 LUMBAR STENOSIS WITH NEUROGENIC CLAUDICATION: Status: ACTIVE | Noted: 2022-07-01

## 2022-07-01 LAB
ABO: NORMAL
ANTIBODY SCREEN: NORMAL
RH FACTOR: NORMAL

## 2022-07-01 PROCEDURE — 6360000002 HC RX W HCPCS: Performed by: PHYSICIAN ASSISTANT

## 2022-07-01 PROCEDURE — 3700000000 HC ANESTHESIA ATTENDED CARE: Performed by: ORTHOPAEDIC SURGERY

## 2022-07-01 PROCEDURE — 2580000003 HC RX 258: Performed by: PHYSICIAN ASSISTANT

## 2022-07-01 PROCEDURE — 1200000000 HC SEMI PRIVATE

## 2022-07-01 PROCEDURE — 3700000001 HC ADD 15 MINUTES (ANESTHESIA): Performed by: ORTHOPAEDIC SURGERY

## 2022-07-01 PROCEDURE — 7100000001 HC PACU RECOVERY - ADDTL 15 MIN: Performed by: ORTHOPAEDIC SURGERY

## 2022-07-01 PROCEDURE — 72020 X-RAY EXAM OF SPINE 1 VIEW: CPT

## 2022-07-01 PROCEDURE — 6370000000 HC RX 637 (ALT 250 FOR IP): Performed by: PHYSICIAN ASSISTANT

## 2022-07-01 PROCEDURE — 6360000002 HC RX W HCPCS: Performed by: NURSE ANESTHETIST, CERTIFIED REGISTERED

## 2022-07-01 PROCEDURE — 36415 COLL VENOUS BLD VENIPUNCTURE: CPT

## 2022-07-01 PROCEDURE — 7100000000 HC PACU RECOVERY - FIRST 15 MIN: Performed by: ORTHOPAEDIC SURGERY

## 2022-07-01 PROCEDURE — 01NB0ZZ RELEASE LUMBAR NERVE, OPEN APPROACH: ICD-10-PCS | Performed by: ORTHOPAEDIC SURGERY

## 2022-07-01 PROCEDURE — 0SG0071 FUSION OF LUMBAR VERTEBRAL JOINT WITH AUTOLOGOUS TISSUE SUBSTITUTE, POSTERIOR APPROACH, POSTERIOR COLUMN, OPEN APPROACH: ICD-10-PCS | Performed by: ORTHOPAEDIC SURGERY

## 2022-07-01 PROCEDURE — 86901 BLOOD TYPING SEROLOGIC RH(D): CPT

## 2022-07-01 PROCEDURE — 86900 BLOOD TYPING SEROLOGIC ABO: CPT

## 2022-07-01 PROCEDURE — 2720000010 HC SURG SUPPLY STERILE: Performed by: ORTHOPAEDIC SURGERY

## 2022-07-01 PROCEDURE — 6360000002 HC RX W HCPCS: Performed by: ORTHOPAEDIC SURGERY

## 2022-07-01 PROCEDURE — C1713 ANCHOR/SCREW BN/BN,TIS/BN: HCPCS | Performed by: ORTHOPAEDIC SURGERY

## 2022-07-01 PROCEDURE — 2709999900 HC NON-CHARGEABLE SUPPLY: Performed by: ORTHOPAEDIC SURGERY

## 2022-07-01 PROCEDURE — 2500000003 HC RX 250 WO HCPCS: Performed by: NURSE ANESTHETIST, CERTIFIED REGISTERED

## 2022-07-01 PROCEDURE — 3600000015 HC SURGERY LEVEL 5 ADDTL 15MIN: Performed by: ORTHOPAEDIC SURGERY

## 2022-07-01 PROCEDURE — 86850 RBC ANTIBODY SCREEN: CPT

## 2022-07-01 PROCEDURE — 3600000005 HC SURGERY LEVEL 5 BASE: Performed by: ORTHOPAEDIC SURGERY

## 2022-07-01 PROCEDURE — 6360000002 HC RX W HCPCS: Performed by: ANESTHESIOLOGY

## 2022-07-01 PROCEDURE — 6370000000 HC RX 637 (ALT 250 FOR IP): Performed by: INTERNAL MEDICINE

## 2022-07-01 PROCEDURE — 2580000003 HC RX 258: Performed by: NURSE ANESTHETIST, CERTIFIED REGISTERED

## 2022-07-01 PROCEDURE — 6360000002 HC RX W HCPCS

## 2022-07-01 DEVICE — SCREW SPNL L45MM DIA6.5MM 60DEG THORLUM PEDCL TI ALLY: Type: IMPLANTABLE DEVICE | Site: SPINE LUMBAR | Status: FUNCTIONAL

## 2022-07-01 DEVICE — ROD SPNL L35MM DIA55MM PREBENT FOR STREAMLINE TL SPNL FIX: Type: IMPLANTABLE DEVICE | Site: SPINE LUMBAR | Status: FUNCTIONAL

## 2022-07-01 DEVICE — SCREW SPNL L40MM DIA6.5MM THORLUM TI ALLY POLYAX STREAMLINE: Type: IMPLANTABLE DEVICE | Site: SPINE LUMBAR | Status: FUNCTIONAL

## 2022-07-01 DEVICE — SET SCR SPNL TI STREAMLINE: Type: IMPLANTABLE DEVICE | Site: SPINE LUMBAR | Status: FUNCTIONAL

## 2022-07-01 DEVICE — ALLOGRAFT BNE BRIDGE 50X25 MM 24 CC BIOADAPT: Type: IMPLANTABLE DEVICE | Site: SPINE LUMBAR | Status: FUNCTIONAL

## 2022-07-01 RX ORDER — ONDANSETRON 2 MG/ML
4 INJECTION INTRAMUSCULAR; INTRAVENOUS EVERY 6 HOURS PRN
Status: DISCONTINUED | OUTPATIENT
Start: 2022-07-01 | End: 2022-07-03 | Stop reason: HOSPADM

## 2022-07-01 RX ORDER — SODIUM CHLORIDE 0.9 % (FLUSH) 0.9 %
5-40 SYRINGE (ML) INJECTION EVERY 12 HOURS SCHEDULED
Status: DISCONTINUED | OUTPATIENT
Start: 2022-07-01 | End: 2022-07-03 | Stop reason: HOSPADM

## 2022-07-01 RX ORDER — ROCURONIUM BROMIDE 10 MG/ML
INJECTION, SOLUTION INTRAVENOUS PRN
Status: DISCONTINUED | OUTPATIENT
Start: 2022-07-01 | End: 2022-07-01 | Stop reason: SDUPTHER

## 2022-07-01 RX ORDER — PANTOPRAZOLE SODIUM 40 MG/1
40 TABLET, DELAYED RELEASE ORAL DAILY
Status: DISCONTINUED | OUTPATIENT
Start: 2022-07-01 | End: 2022-07-03 | Stop reason: HOSPADM

## 2022-07-01 RX ORDER — SODIUM CHLORIDE 0.9 % (FLUSH) 0.9 %
5-40 SYRINGE (ML) INJECTION PRN
Status: DISCONTINUED | OUTPATIENT
Start: 2022-07-01 | End: 2022-07-03 | Stop reason: HOSPADM

## 2022-07-01 RX ORDER — SODIUM CHLORIDE 0.9 % (FLUSH) 0.9 %
5-40 SYRINGE (ML) INJECTION EVERY 12 HOURS SCHEDULED
Status: DISCONTINUED | OUTPATIENT
Start: 2022-07-01 | End: 2022-07-01 | Stop reason: HOSPADM

## 2022-07-01 RX ORDER — BUSPIRONE HYDROCHLORIDE 10 MG/1
10 TABLET ORAL 2 TIMES DAILY
Status: DISCONTINUED | OUTPATIENT
Start: 2022-07-01 | End: 2022-07-03 | Stop reason: HOSPADM

## 2022-07-01 RX ORDER — CYCLOBENZAPRINE HCL 10 MG
10 TABLET ORAL 3 TIMES DAILY PRN
Status: DISCONTINUED | OUTPATIENT
Start: 2022-07-01 | End: 2022-07-03 | Stop reason: HOSPADM

## 2022-07-01 RX ORDER — HYDROMORPHONE HCL 110MG/55ML
PATIENT CONTROLLED ANALGESIA SYRINGE INTRAVENOUS PRN
Status: DISCONTINUED | OUTPATIENT
Start: 2022-07-01 | End: 2022-07-01 | Stop reason: SDUPTHER

## 2022-07-01 RX ORDER — TRIAMCINOLONE ACETONIDE 1 MG/G
CREAM TOPICAL 2 TIMES DAILY PRN
Status: DISCONTINUED | OUTPATIENT
Start: 2022-07-01 | End: 2022-07-03 | Stop reason: HOSPADM

## 2022-07-01 RX ORDER — SODIUM CHLORIDE 9 MG/ML
INJECTION, SOLUTION INTRAVENOUS CONTINUOUS
Status: DISCONTINUED | OUTPATIENT
Start: 2022-07-01 | End: 2022-07-01 | Stop reason: HOSPADM

## 2022-07-01 RX ORDER — PROPOFOL 10 MG/ML
INJECTION, EMULSION INTRAVENOUS PRN
Status: DISCONTINUED | OUTPATIENT
Start: 2022-07-01 | End: 2022-07-01 | Stop reason: SDUPTHER

## 2022-07-01 RX ORDER — LIOTHYRONINE SODIUM 5 UG/1
5 TABLET ORAL 2 TIMES DAILY
Status: DISCONTINUED | OUTPATIENT
Start: 2022-07-01 | End: 2022-07-01 | Stop reason: SDUPTHER

## 2022-07-01 RX ORDER — MIDAZOLAM HYDROCHLORIDE 1 MG/ML
INJECTION INTRAMUSCULAR; INTRAVENOUS PRN
Status: DISCONTINUED | OUTPATIENT
Start: 2022-07-01 | End: 2022-07-01 | Stop reason: SDUPTHER

## 2022-07-01 RX ORDER — BISACODYL 10 MG
10 SUPPOSITORY, RECTAL RECTAL DAILY PRN
Status: DISCONTINUED | OUTPATIENT
Start: 2022-07-01 | End: 2022-07-03 | Stop reason: HOSPADM

## 2022-07-01 RX ORDER — SODIUM CHLORIDE 9 MG/ML
INJECTION, SOLUTION INTRAVENOUS PRN
Status: DISCONTINUED | OUTPATIENT
Start: 2022-07-01 | End: 2022-07-01 | Stop reason: HOSPADM

## 2022-07-01 RX ORDER — LIDOCAINE HYDROCHLORIDE 20 MG/ML
INJECTION, SOLUTION INTRAVENOUS PRN
Status: DISCONTINUED | OUTPATIENT
Start: 2022-07-01 | End: 2022-07-01 | Stop reason: SDUPTHER

## 2022-07-01 RX ORDER — LOSARTAN POTASSIUM 50 MG/1
50 TABLET ORAL NIGHTLY
Status: DISCONTINUED | OUTPATIENT
Start: 2022-07-01 | End: 2022-07-03 | Stop reason: HOSPADM

## 2022-07-01 RX ORDER — FENTANYL CITRATE 50 UG/ML
50 INJECTION, SOLUTION INTRAMUSCULAR; INTRAVENOUS EVERY 5 MIN PRN
Status: DISCONTINUED | OUTPATIENT
Start: 2022-07-01 | End: 2022-07-01

## 2022-07-01 RX ORDER — LABETALOL 20 MG/4 ML (5 MG/ML) INTRAVENOUS SYRINGE
Status: DISCONTINUED
Start: 2022-07-01 | End: 2022-07-01

## 2022-07-01 RX ORDER — SODIUM CHLORIDE 0.9 % (FLUSH) 0.9 %
5-40 SYRINGE (ML) INJECTION PRN
Status: DISCONTINUED | OUTPATIENT
Start: 2022-07-01 | End: 2022-07-01 | Stop reason: HOSPADM

## 2022-07-01 RX ORDER — FENTANYL CITRATE 50 UG/ML
50 INJECTION, SOLUTION INTRAMUSCULAR; INTRAVENOUS EVERY 5 MIN PRN
Status: DISCONTINUED | OUTPATIENT
Start: 2022-07-01 | End: 2022-07-01 | Stop reason: HOSPADM

## 2022-07-01 RX ORDER — FENTANYL CITRATE 50 UG/ML
INJECTION, SOLUTION INTRAMUSCULAR; INTRAVENOUS PRN
Status: DISCONTINUED | OUTPATIENT
Start: 2022-07-01 | End: 2022-07-01 | Stop reason: SDUPTHER

## 2022-07-01 RX ORDER — ONDANSETRON 2 MG/ML
INJECTION INTRAMUSCULAR; INTRAVENOUS PRN
Status: DISCONTINUED | OUTPATIENT
Start: 2022-07-01 | End: 2022-07-01 | Stop reason: SDUPTHER

## 2022-07-01 RX ORDER — SENNA AND DOCUSATE SODIUM 50; 8.6 MG/1; MG/1
1 TABLET, FILM COATED ORAL 2 TIMES DAILY
Status: DISCONTINUED | OUTPATIENT
Start: 2022-07-01 | End: 2022-07-01

## 2022-07-01 RX ORDER — ACETAMINOPHEN 325 MG/1
650 TABLET ORAL EVERY 6 HOURS PRN
Status: DISCONTINUED | OUTPATIENT
Start: 2022-07-01 | End: 2022-07-03 | Stop reason: HOSPADM

## 2022-07-01 RX ORDER — SODIUM CHLORIDE, SODIUM LACTATE, POTASSIUM CHLORIDE, CALCIUM CHLORIDE 600; 310; 30; 20 MG/100ML; MG/100ML; MG/100ML; MG/100ML
INJECTION, SOLUTION INTRAVENOUS CONTINUOUS PRN
Status: DISCONTINUED | OUTPATIENT
Start: 2022-07-01 | End: 2022-07-01 | Stop reason: SDUPTHER

## 2022-07-01 RX ORDER — POLYETHYLENE GLYCOL 3350 17 G/17G
17 POWDER, FOR SOLUTION ORAL DAILY
Status: DISCONTINUED | OUTPATIENT
Start: 2022-07-01 | End: 2022-07-03 | Stop reason: HOSPADM

## 2022-07-01 RX ORDER — LEVOTHYROXINE SODIUM 0.12 MG/1
125 TABLET ORAL DAILY
Status: DISCONTINUED | OUTPATIENT
Start: 2022-07-02 | End: 2022-07-03 | Stop reason: HOSPADM

## 2022-07-01 RX ORDER — FENTANYL CITRATE 50 UG/ML
INJECTION, SOLUTION INTRAMUSCULAR; INTRAVENOUS
Status: COMPLETED
Start: 2022-07-01 | End: 2022-07-01

## 2022-07-01 RX ORDER — SENNA AND DOCUSATE SODIUM 50; 8.6 MG/1; MG/1
2 TABLET, FILM COATED ORAL 2 TIMES DAILY
Status: DISCONTINUED | OUTPATIENT
Start: 2022-07-01 | End: 2022-07-03 | Stop reason: HOSPADM

## 2022-07-01 RX ORDER — SODIUM CHLORIDE 9 MG/ML
INJECTION, SOLUTION INTRAVENOUS PRN
Status: DISCONTINUED | OUTPATIENT
Start: 2022-07-01 | End: 2022-07-03 | Stop reason: HOSPADM

## 2022-07-01 RX ORDER — SODIUM CHLORIDE 9 MG/ML
INJECTION, SOLUTION INTRAVENOUS CONTINUOUS
Status: DISCONTINUED | OUTPATIENT
Start: 2022-07-01 | End: 2022-07-03

## 2022-07-01 RX ORDER — DEXAMETHASONE SODIUM PHOSPHATE 10 MG/ML
INJECTION, EMULSION INTRAMUSCULAR; INTRAVENOUS PRN
Status: DISCONTINUED | OUTPATIENT
Start: 2022-07-01 | End: 2022-07-01 | Stop reason: SDUPTHER

## 2022-07-01 RX ORDER — LIOTHYRONINE SODIUM 5 UG/1
2.5 TABLET ORAL NIGHTLY
Status: DISCONTINUED | OUTPATIENT
Start: 2022-07-01 | End: 2022-07-03 | Stop reason: HOSPADM

## 2022-07-01 RX ORDER — OXYCODONE HYDROCHLORIDE 5 MG/1
10 TABLET ORAL EVERY 6 HOURS PRN
Status: DISCONTINUED | OUTPATIENT
Start: 2022-07-01 | End: 2022-07-03 | Stop reason: HOSPADM

## 2022-07-01 RX ORDER — SODIUM PHOSPHATE, DIBASIC AND SODIUM PHOSPHATE, MONOBASIC 7; 19 G/133ML; G/133ML
1 ENEMA RECTAL DAILY PRN
Status: DISCONTINUED | OUTPATIENT
Start: 2022-07-01 | End: 2022-07-03 | Stop reason: HOSPADM

## 2022-07-01 RX ORDER — OXYCODONE HYDROCHLORIDE 5 MG/1
5 TABLET ORAL EVERY 6 HOURS PRN
Status: DISCONTINUED | OUTPATIENT
Start: 2022-07-01 | End: 2022-07-03 | Stop reason: HOSPADM

## 2022-07-01 RX ORDER — VANCOMYCIN HYDROCHLORIDE 1 G/20ML
INJECTION, POWDER, LYOPHILIZED, FOR SOLUTION INTRAVENOUS PRN
Status: DISCONTINUED | OUTPATIENT
Start: 2022-07-01 | End: 2022-07-01 | Stop reason: ALTCHOICE

## 2022-07-01 RX ORDER — LIOTHYRONINE SODIUM 5 UG/1
5 TABLET ORAL EVERY MORNING
Status: DISCONTINUED | OUTPATIENT
Start: 2022-07-01 | End: 2022-07-03 | Stop reason: HOSPADM

## 2022-07-01 RX ORDER — ONDANSETRON 4 MG/1
4 TABLET, ORALLY DISINTEGRATING ORAL EVERY 8 HOURS PRN
Status: DISCONTINUED | OUTPATIENT
Start: 2022-07-01 | End: 2022-07-03 | Stop reason: HOSPADM

## 2022-07-01 RX ADMIN — BUSPIRONE HYDROCHLORIDE 10 MG: 10 TABLET ORAL at 21:30

## 2022-07-01 RX ADMIN — METOPROLOL TARTRATE 25 MG: 25 TABLET, FILM COATED ORAL at 21:30

## 2022-07-01 RX ADMIN — DEXAMETHASONE SODIUM PHOSPHATE 10 MG: 10 INJECTION, EMULSION INTRAMUSCULAR; INTRAVENOUS at 09:06

## 2022-07-01 RX ADMIN — FENTANYL CITRATE 50 MCG: 50 INJECTION, SOLUTION INTRAMUSCULAR; INTRAVENOUS at 11:35

## 2022-07-01 RX ADMIN — HYDROMORPHONE HYDROCHLORIDE 1 MG: 2 INJECTION INTRAMUSCULAR; INTRAVENOUS; SUBCUTANEOUS at 10:45

## 2022-07-01 RX ADMIN — FENTANYL CITRATE 50 MCG: 50 INJECTION, SOLUTION INTRAMUSCULAR; INTRAVENOUS at 11:30

## 2022-07-01 RX ADMIN — LIOTHYRONINE SODIUM 2.5 MCG: 5 TABLET ORAL at 21:35

## 2022-07-01 RX ADMIN — HYDROMORPHONE HYDROCHLORIDE 0.5 MG: 1 INJECTION, SOLUTION INTRAMUSCULAR; INTRAVENOUS; SUBCUTANEOUS at 19:33

## 2022-07-01 RX ADMIN — OXYCODONE 10 MG: 5 TABLET ORAL at 23:26

## 2022-07-01 RX ADMIN — CEFAZOLIN SODIUM 2000 MG: 10 INJECTION, POWDER, FOR SOLUTION INTRAVENOUS at 16:49

## 2022-07-01 RX ADMIN — FENTANYL CITRATE 100 MCG: 50 INJECTION INTRAMUSCULAR; INTRAVENOUS at 08:49

## 2022-07-01 RX ADMIN — SUGAMMADEX 200 MG: 100 INJECTION, SOLUTION INTRAVENOUS at 10:10

## 2022-07-01 RX ADMIN — PROPOFOL 200 MG: 10 INJECTION, EMULSION INTRAVENOUS at 08:49

## 2022-07-01 RX ADMIN — SODIUM CHLORIDE: 9 INJECTION, SOLUTION INTRAVENOUS at 23:27

## 2022-07-01 RX ADMIN — MIDAZOLAM 2 MG: 1 INJECTION INTRAMUSCULAR; INTRAVENOUS at 08:47

## 2022-07-01 RX ADMIN — LIDOCAINE HYDROCHLORIDE 80 MG: 20 INJECTION INTRAVENOUS at 08:49

## 2022-07-01 RX ADMIN — HYDROMORPHONE HYDROCHLORIDE 1 MG: 2 INJECTION INTRAMUSCULAR; INTRAVENOUS; SUBCUTANEOUS at 11:01

## 2022-07-01 RX ADMIN — ONDANSETRON 4 MG: 2 INJECTION INTRAMUSCULAR; INTRAVENOUS at 10:51

## 2022-07-01 RX ADMIN — ROCURONIUM BROMIDE 40 MG: 50 INJECTION, SOLUTION INTRAVENOUS at 08:49

## 2022-07-01 RX ADMIN — HYDROMORPHONE HYDROCHLORIDE 0.5 MG: 1 INJECTION, SOLUTION INTRAMUSCULAR; INTRAVENOUS; SUBCUTANEOUS at 11:40

## 2022-07-01 RX ADMIN — LOSARTAN POTASSIUM 50 MG: 50 TABLET, FILM COATED ORAL at 21:29

## 2022-07-01 RX ADMIN — SODIUM CHLORIDE: 9 INJECTION, SOLUTION INTRAVENOUS at 08:12

## 2022-07-01 RX ADMIN — SODIUM CHLORIDE, POTASSIUM CHLORIDE, SODIUM LACTATE AND CALCIUM CHLORIDE: 600; 310; 30; 20 INJECTION, SOLUTION INTRAVENOUS at 09:20

## 2022-07-01 RX ADMIN — OXYCODONE 10 MG: 5 TABLET ORAL at 16:40

## 2022-07-01 RX ADMIN — FENTANYL CITRATE 100 MCG: 50 INJECTION INTRAMUSCULAR; INTRAVENOUS at 08:55

## 2022-07-01 RX ADMIN — DOCUSATE SODIUM 50 MG AND SENNOSIDES 8.6 MG 2 TABLET: 8.6; 5 TABLET, FILM COATED ORAL at 21:29

## 2022-07-01 RX ADMIN — SODIUM CHLORIDE: 9 INJECTION, SOLUTION INTRAVENOUS at 14:11

## 2022-07-01 RX ADMIN — HYDROMORPHONE HYDROCHLORIDE 0.5 MG: 1 INJECTION, SOLUTION INTRAMUSCULAR; INTRAVENOUS; SUBCUTANEOUS at 14:10

## 2022-07-01 RX ADMIN — POLYETHYLENE GLYCOL 3350 17 G: 17 POWDER, FOR SOLUTION ORAL at 21:29

## 2022-07-01 RX ADMIN — HYDROMORPHONE HYDROCHLORIDE 0.5 MG: 1 INJECTION, SOLUTION INTRAMUSCULAR; INTRAVENOUS; SUBCUTANEOUS at 11:45

## 2022-07-01 RX ADMIN — ROCURONIUM BROMIDE 10 MG: 50 INJECTION, SOLUTION INTRAVENOUS at 09:26

## 2022-07-01 RX ADMIN — FENTANYL CITRATE 50 MCG: 50 INJECTION INTRAMUSCULAR; INTRAVENOUS at 09:20

## 2022-07-01 RX ADMIN — CEFAZOLIN SODIUM 2000 MG: 10 INJECTION, POWDER, FOR SOLUTION INTRAVENOUS at 09:05

## 2022-07-01 RX ADMIN — PANTOPRAZOLE SODIUM 40 MG: 40 TABLET, DELAYED RELEASE ORAL at 21:29

## 2022-07-01 ASSESSMENT — PAIN SCALES - GENERAL
PAINLEVEL_OUTOF10: 8
PAINLEVEL_OUTOF10: 9
PAINLEVEL_OUTOF10: 7
PAINLEVEL_OUTOF10: 7
PAINLEVEL_OUTOF10: 8
PAINLEVEL_OUTOF10: 7

## 2022-07-01 ASSESSMENT — PAIN DESCRIPTION - DESCRIPTORS
DESCRIPTORS: SORE
DESCRIPTORS: ACHING

## 2022-07-01 ASSESSMENT — PAIN DESCRIPTION - ORIENTATION: ORIENTATION: MID

## 2022-07-01 ASSESSMENT — PAIN DESCRIPTION - LOCATION
LOCATION: BACK
LOCATION: BACK

## 2022-07-01 ASSESSMENT — ENCOUNTER SYMPTOMS: SHORTNESS OF BREATH: 1

## 2022-07-01 ASSESSMENT — PAIN - FUNCTIONAL ASSESSMENT: PAIN_FUNCTIONAL_ASSESSMENT: 0-10

## 2022-07-01 NOTE — PROGRESS NOTES
Patient resting in bed, c/o back pain  Denies leg pain or N/T    5/5 bilateral pedal push/pull  Sensory intact to touch LE  Dressing - C/D/I  Hemovac drains functioning    Plan:  1. Continue drains  2. Bowel regimen  3. AM labs  4. PT/OT ordered  5.  Discharge pending    Ephraim Kamara

## 2022-07-01 NOTE — PROGRESS NOTES
Pt admitted to  7K11 from surgery. IV lactated Ringer's infusing into the hand left, condition patent and no redness at a rate of 100 mls/ hour with about 200 mls in the bag still. IV site free of s/s of infection or infiltration. Vital signs obtained. Assessment and data collection initiated. 2 hemovac drains noted. Two nurse skin assessment performed by [facility] RN and Jr Jeff. Oriented to room. Policies and procedures for 7 explained. All questions answered with no further questions at this time. Fall prevention and safety brochure discussed with patient. Bed alarm on. Call light in reach.

## 2022-07-01 NOTE — OP NOTE
Operative Note      PATIENT NAME: Mikhail Hardin  YOB: 1965  MRN: [id number]                     ACCOUNT NO: [de-identified]                               ROOM: 012  ADMISSION DATE: 7/1/2022    PROVIDER:  Doreen Leblanc M.D.     DATE OF PROCEDURE: 7/1/2022     PREOPERATIVE DIAGNOSES:  1. L4-5 spondylolisthesis, grade 1  2. L4-5 lumbar stenosis with radiculopathy  3. Obesity, BMI 34.78     POSTOPERATIVE DIAGNOSES:  1. L4-5 spondylolisthesis, grade 1  2. L4-5 lumbar stenosis with radiculopathy  3. Obesity, BMI 34.78     OPERATION PERFORMED:  1. L4-5 bilateral laminectomy, partial medial facetectomies and foraminotomies of L4 and L5  2. L4-5 posterior spinal fusion. 3. L4-5 posterior spinal instrumentation, Streamline, SurgAlign instrumentation. 4.  Use of local autograft bone and 24cc BioAdapt Bridge      SURGEON: Mae Murphy MD    ASSISTANT:  AMY Becerril PA-C, assisted throughout the procedure with positioning, draping, retraction, wound closure, and dressing application. ANESTHESIA:  General.     INDICATIONS:  This is a 65 y.o. female with refractory back and right leg pain from degenerative spondylolisthesis and lumbar stenosis L4-5. Patient had failed full conservative therapy including medication management, physical therapy. Due to the persistence of symptoms and reduction in the ADLs, patient elected surgical treatment. Patient, therefore, understood indications for the surgery as well as its risks, benefits, and alternatives. These risks include but are not limited to paralysis, infection, hematoma, dural tear, nerve root injury, nonunion, DVT/PE, stroke, MI, death etc.  All questions were answered. Informed consent was obtained. OPERATIVE PROCEDURE:  The patient was taken to the operating room by the Anesthesiology Service and had satisfactory general anesthesia. A first-generation cephalosporin was given within 1 hour of surgical incision.   2 gm of cefazolin was given IV. Venous thromboembolic prophylaxis was performed with sequential devices. The patient was then positioned prone on a standard OSI frame with the abdomen hanging free and all bony prominences well padded. The low back was then prepped and draped in its entirety in the usual sterile fashion. Before incision, a formal timeout was taken per protocol. We next took a midline longitudinal approach and performed subperiosteal dissection out to the tips of the transverse processes of L4 and L5. Intraoperative radiographic localization of level was confirmed. We then began the laminectomy as well as decompression by removing the spinous process of L4. We entered the spinal canal, resecting the ligamentum flavum in its entirety over this region. Patient had significant stenosis in the lateral recess and neural foramina. Partial medial facetectomy was then performed with an osteotome to get lateral to the facet overhang, but just medial to the pedicles and nerve roots. Kerrison's were then used to perform foraminotomies of the L4 and L5 nerve roots bilaterally. In this way, we completed decompression at L4-5 with foraminotomies of the L4 and L5 nerve roots bilaterally. Satisfied with this, we then turned our attention to perform spinal instrumentation and posterolateral fusion. Using anatomic landmarks and guided by direct visualization of the pedicles from within the canal, pedicle screws were placed bilaterally at L4 and L5. All the screws were completely interosseous as determined by bony palpation. Before the screws were inserted, the transverse processes were decorticated with a high-speed bur at L4 and L5. Local autograft bone was placed over the decorticated elements bilaterally from L4-5. Javier Mo was placed on top of this for extra graft bulk. The screws were then inserted which were under tapped by 1 mm.   Two 35-mm rods were placed from L4-5, set screws engaged and tightened down to their final torque. Everything was tightened down. A very rigid construct was achieved. Final x-ray was taken, demonstrated good position of the spine and all of the implants. Satisfied with this, we then achieved hemostasis. We then copiously irrigated the wound. We then inserted a Hemovac drain through a separate stab incision. The wound was then closed in layer with interrupted 1 and 2-0 Vicryl sutures and dusted with vancomycin powder. A 3-0 Monocryl was used for the skin. The skin edges were sealed with Dermabond. A dry sterile dressing was applied. The patient was then returned to the hospital bed, extubated, and taken to the recovery room in stable condition. Spinal cord monitoring remained stable throughout the operation. Implants:  Implant Name Type Inv. Item Serial No.  Lot No. LRB No. Used Action   ALLOGRAFT BNE BRIDGE 50X25 MM 24 CC BIOADAPT - [id number]  ALLOGRAFT BNE BRIDGE 50X25 MM 24 CC BIOADAPT  [employer] [id number] N/A 1 Implanted   SET SCR SPNL TI STREAMLINE - [id number]  SET SCR SPNL TI STREAMLINE  [employer]  N/A 4 Implanted   SCREW SPNL L40MM DIA6.5MM THORLUM TI ALLY POLYAX STREAMLINE - [id number]  SCREW SPNL L40MM DIA6.5MM THORLUM TI ALLY POLYAX STREAMLINE  [employer]  N/A 2 Implanted         Drains:   Closed/Suction Drain Posterior Back Accordion (Active)       Closed/Suction Drain Posterior Back Accordion (Active)       COMPLICATIONS:  None. SPECIMENS:  None. ESTIMATED BLOOD LOSS:  250 mL. POSTOPERATIVE CARE:  The patient will be recovered in PACU and then a regular nursing floor. Once the drainage is low and pain is under control, patient will be discharged home per clinical indication. Patient will follow up in the office in 6 weeks. At that time, AP and lateral x-rays of the lumbar spine will be obtained to assess instrumentation and fusion.      MODIFIER 22:  Due to the patient's BMI greater than 30, modifier 22 will be applied due to the patient's case taking 50% longer due to poor visualization and orientation.     Electronically signed by Lovely Carney MD on 7/1/2022 at 10:30 AM
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
## 2022-07-01 NOTE — ANESTHESIA POSTPROCEDURE EVALUATION
Department of Anesthesiology  Postprocedure Note    Patient: Nohelia Abdalla  MRN: [id number]  YOB: 1965  Date of evaluation: 7/1/2022      Procedure Summary     Date: 07/01/22 Room / Location: [address] [facility] [place]    Anesthesia Start: 1045 Anesthesia Stop: 5231    Procedure: L4-5 DECOMPRESSION L4-5 PSF (N/A Spine Lumbar) Diagnosis:       Spinal stenosis of lumbar region, unspecified whether neurogenic claudication present      (Spinal stenosis of lumbar region, unspecified whether neurogenic claudication present [M48.061])    Surgeons: Daniel Caldwell MD Responsible Provider: Veronica Larsen DO    Anesthesia Type: general ASA Status: 2          Anesthesia Type: No value filed. Georgina Phase I: Georgina Score: 8    Georgina Phase II:        Anesthesia Post Evaluation    Patient location during evaluation: PACU  Patient participation: complete - patient participated  Level of consciousness: awake and alert  Airway patency: patent  Nausea & Vomiting: no nausea and no vomiting  Complications: no  Cardiovascular status: hemodynamically stable  Respiratory status: acceptable  Hydration status: euvolemic      [facility]  POST-ANESTHESIA NOTE       Name:  Nohelia Abdalla                                         Age:  65 y.o.   MRN:  [id number]      Last Vitals:  BP (!) 144/57   Pulse 89   Temp 98.1 °F (36.7 °C) (Temporal)   Resp 13   Ht 5' 5\" (1.651 m)   Wt 209 lb (94.8 kg)   LMP 02/05/2016   SpO2 96%   BMI 34.78 kg/m²   Patient Vitals for the past 4 hrs:   BP Temp Temp src Pulse Resp SpO2   07/01/22 1205 (!) 144/57 -- -- 89 13 96 %   07/01/22 1200 (!) 150/67 -- -- 80 13 93 %   07/01/22 1155 (!) 138/59 -- -- 89 12 93 %   07/01/22 1150 136/65 -- -- 84 12 92 %   07/01/22 1145 (!) 141/64 -- -- 84 13 93 %   07/01/22 1140 (!) 171/72 -- -- 97 18 92 %   07/01/22 1135 136/64 -- -- 84 15 92 %   07/01/22 1130 (!) 144/64 -- -- 89 12 95 %   07/01/22 1125 (!) 145/63 -- -- 89 13 93 %   07/01/22 1120 137/62 -- -- 87 12 93 %   07/01/22 1115 (!) 148/62 -- -- 86 15 92 %   07/01/22 1110 (!) 149/66 -- -- 78 13 93 %   07/01/22 1100 133/86 98.1 °F (36.7 °C) Temporal 80 12 93 %       Level of Consciousness:  Awake    Respiratory:  Stable    Oxygen Saturation:  Stable    Cardiovascular:  Stable    Hydration:  Adequate    PONV:  Stable    Post-op Pain:  Adequate analgesia    Post-op Assessment:  No apparent anesthetic complications    Additional Follow-Up / Treatment / Comment:  None    David Landa MD  July 1, 2022   12:25 PM

## 2022-07-01 NOTE — PROGRESS NOTES
1100: pt arrives to pacu. Pt on room air. Pt responds to verbal stimulation. VSS. Respirations unlabored. Two hemovac drains noted. CRNA gave dilaudid at bedside  1103: pt placed on 2L nasal cannula  1115: pt resting in bed. Pt states pain is tolerable   1125: pt resting in bed with eyes closed   1130: pt given 50mcg of fentanyl   1135: pt given 50mcg of fentanyl   1140: pt given 0.5mg of dilaudid   1145: pt given 0.5mg of dilaudid   1146: pt placed on bedpan   1155: pt still trying to use bedpan   1200: report called to [facility] 56   5659: this rn asked pt if she wanted the bedpan removed. Pt stated no. Bedpan is still underneath pt. Pt meets discharge criteria from pacu. 1210: transport arrives to pacu.  Pt transported to [place]

## 2022-07-01 NOTE — BRIEF OP NOTE
Brief Postoperative Note      Patient: Kim Butts  YOB: 1965  MRN: [id number]    Date of Procedure: 7/1/2022    Pre-Op Diagnosis: L4-5 spondy, grade 1 with stenosis and radiculopathy    Post-Op Diagnosis: Same       Procedure(s):  L4-5 DECOMPRESSION L4-5 PSF    Surgeon(s):  Angelina Banks MD    Assistant:  Sameer Nickerson PA-C    Anesthesia: General    Estimated Blood Loss (mL): 204    Complications: None    Specimens:   * No specimens in log *    Implants:  Implant Name Type Inv.  Item Serial No.  Lot No. LRB No. Used Action   ALLOGRAFT BNE BRIDGE 50X25 MM 24 CC BIOADAPT - [id number]  ALLOGRAFT BNE BRIDGE 50X25 MM 24 CC BIOADAPT  [employer] [id number] N/A 1 Implanted   SET SCR SPNL TI STREAMLINE - [id number]  SET SCR SPNL TI STREAMLINE  [employer]  N/A 4 Implanted   SCREW SPNL L40MM DIA6.5MM THORLUM TI ALLY POLYAX STREAMLINE - [id number]  SCREW SPNL L40MM DIA6.5MM THORLUM TI ALLY POLYAX STREAMLINE  [employer]  N/A 2 Implanted   SCREW SPNL L45MM DIA6.5MM 60DEG THORLUM PEDCL TI ALLY - [id number]  SCREW SPNL L45MM DIA6.5MM 60DEG THORLUM PEDCL TI ALLY  [employer]  N/A 2 Implanted   JESSE SPNL L35MM [id number] PREBENT FOR STREAMLINE TL SPNL FIX - [id number]  JESSE SPNL L35MM [id number] PREBENT FOR STREAMLINE TL SPNL FIX  [employer]  N/A 2 Implanted   ALLOGRAFT BNE BRIDGE 50X25 MM 24 CC BIOADAPT - [id number]  ALLOGRAFT BNE BRIDGE 50X25 MM 24 CC BIOADAPT  [employer]  N/A 1 Implanted         Drains:   Closed/Suction Drain Posterior Back Accordion (Active)       Closed/Suction Drain Posterior Back Accordion (Active)       Findings: Stenosis    Electronically signed by Angelina Banks MD on 7/1/2022 at 10:43 AM

## 2022-07-01 NOTE — CARE COORDINATION
7/1/22, 2:11 PM EDT  DISCHARGE PLANNING EVALUATION:    Pawel Tim       Admitted: 7/1/2022/ Raheel 78 day: 0   Location: -11/011-A Reason for admit: Spinal stenosis of lumbar region, unspecified whether neurogenic claudication present [M48.061]  Lumbar stenosis with neurogenic claudication [M48.062]   PMH:  has a past medical history of Anxiety, Arthritis, History of palpitations in adulthood, Hx of blood clots, Hypertension, Primary thyroid cancer ([address] Pure hyperglyceridemia, Tumor of retina, and Unspecified hearing loss, left ear. Procedure: 7/1 L4-5 DECOMPRESSION L4-5 PSF by Dr Melva Lewis  Barriers to Discharge:  Monitor drain outputs x2, dressing care, spinal precautions, pain control, therapy. PCP: YOBANY Melgar CNP  Readmission Risk Score: 6.3 ( )%  Patient's Healthcare Decision Maker: Named in [place]    Patient Goals/Plan/Treatment Preferences: Met with Scarlett this afternoon; she lives home in two [facility], has PCP, and her insurance verified. She can stay on first floor, has recliner, RW, and chose [facility] for drain mgmt and removal at time of discharge. Planning discharge over weekend. 1400- Referral called to [facility]. Transportation/Food Security/Housekeeping Addressed:  No issues identified.
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
## 2022-07-02 LAB
ANION GAP SERPL CALCULATED.3IONS-SCNC: 12 MEQ/L (ref 8–16)
BUN BLDV-MCNC: 12 MG/DL (ref 7–22)
CALCIUM SERPL-MCNC: 9.1 MG/DL (ref 8.5–10.5)
CHLORIDE BLD-SCNC: 101 MEQ/L (ref 98–111)
CO2: 27 MEQ/L (ref 23–33)
CREAT SERPL-MCNC: 0.7 MG/DL (ref 0.4–1.2)
GFR SERPL CREATININE-BSD FRML MDRD: 86 ML/MIN/1.73M2
GLUCOSE BLD-MCNC: 108 MG/DL (ref 70–108)
HCT VFR BLD CALC: 39.8 % (ref 37–47)
HEMOGLOBIN: 12.5 GM/DL (ref 12–16)
POTASSIUM SERPL-SCNC: 3.5 MEQ/L (ref 3.5–5.2)
SODIUM BLD-SCNC: 140 MEQ/L (ref 135–145)

## 2022-07-02 PROCEDURE — 97530 THERAPEUTIC ACTIVITIES: CPT

## 2022-07-02 PROCEDURE — 97165 OT EVAL LOW COMPLEX 30 MIN: CPT

## 2022-07-02 PROCEDURE — 97163 PT EVAL HIGH COMPLEX 45 MIN: CPT

## 2022-07-02 PROCEDURE — 6360000002 HC RX W HCPCS: Performed by: PHYSICIAN ASSISTANT

## 2022-07-02 PROCEDURE — 85014 HEMATOCRIT: CPT

## 2022-07-02 PROCEDURE — 6370000000 HC RX 637 (ALT 250 FOR IP): Performed by: PHYSICIAN ASSISTANT

## 2022-07-02 PROCEDURE — 1200000000 HC SEMI PRIVATE

## 2022-07-02 PROCEDURE — 97535 SELF CARE MNGMENT TRAINING: CPT

## 2022-07-02 PROCEDURE — 85018 HEMOGLOBIN: CPT

## 2022-07-02 PROCEDURE — 80048 BASIC METABOLIC PNL TOTAL CA: CPT

## 2022-07-02 PROCEDURE — 36415 COLL VENOUS BLD VENIPUNCTURE: CPT

## 2022-07-02 PROCEDURE — 6370000000 HC RX 637 (ALT 250 FOR IP): Performed by: INTERNAL MEDICINE

## 2022-07-02 PROCEDURE — 2580000003 HC RX 258: Performed by: PHYSICIAN ASSISTANT

## 2022-07-02 RX ADMIN — METOPROLOL TARTRATE 25 MG: 25 TABLET, FILM COATED ORAL at 09:35

## 2022-07-02 RX ADMIN — NALOXEGOL OXALATE 12.5 MG: 12.5 TABLET, FILM COATED ORAL at 06:45

## 2022-07-02 RX ADMIN — PANTOPRAZOLE SODIUM 40 MG: 40 TABLET, DELAYED RELEASE ORAL at 09:35

## 2022-07-02 RX ADMIN — BUSPIRONE HYDROCHLORIDE 10 MG: 10 TABLET ORAL at 09:35

## 2022-07-02 RX ADMIN — POLYETHYLENE GLYCOL 3350 17 G: 17 POWDER, FOR SOLUTION ORAL at 09:35

## 2022-07-02 RX ADMIN — DOCUSATE SODIUM 50 MG AND SENNOSIDES 8.6 MG 2 TABLET: 8.6; 5 TABLET, FILM COATED ORAL at 21:19

## 2022-07-02 RX ADMIN — LIOTHYRONINE SODIUM 5 MCG: 5 TABLET ORAL at 09:39

## 2022-07-02 RX ADMIN — DOCUSATE SODIUM 50 MG AND SENNOSIDES 8.6 MG 2 TABLET: 8.6; 5 TABLET, FILM COATED ORAL at 09:35

## 2022-07-02 RX ADMIN — LEVOTHYROXINE SODIUM 125 MCG: 0.12 TABLET ORAL at 06:45

## 2022-07-02 RX ADMIN — BUSPIRONE HYDROCHLORIDE 10 MG: 10 TABLET ORAL at 21:19

## 2022-07-02 RX ADMIN — LIOTHYRONINE SODIUM 2.5 MCG: 5 TABLET ORAL at 21:20

## 2022-07-02 RX ADMIN — ONDANSETRON 4 MG: 2 INJECTION INTRAMUSCULAR; INTRAVENOUS at 05:30

## 2022-07-02 RX ADMIN — CEFAZOLIN SODIUM 2000 MG: 10 INJECTION, POWDER, FOR SOLUTION INTRAVENOUS at 01:32

## 2022-07-02 RX ADMIN — OXYCODONE 10 MG: 5 TABLET ORAL at 05:32

## 2022-07-02 RX ADMIN — OXYCODONE 5 MG: 5 TABLET ORAL at 12:10

## 2022-07-02 RX ADMIN — OXYCODONE 5 MG: 5 TABLET ORAL at 18:07

## 2022-07-02 RX ADMIN — BISACODYL 10 MG: 5 TABLET, COATED ORAL at 09:35

## 2022-07-02 RX ADMIN — ACETAMINOPHEN 650 MG: 325 TABLET ORAL at 01:35

## 2022-07-02 ASSESSMENT — PAIN DESCRIPTION - ORIENTATION
ORIENTATION: MID

## 2022-07-02 ASSESSMENT — PAIN SCALES - GENERAL
PAINLEVEL_OUTOF10: 4
PAINLEVEL_OUTOF10: 4
PAINLEVEL_OUTOF10: 5
PAINLEVEL_OUTOF10: 6
PAINLEVEL_OUTOF10: 0
PAINLEVEL_OUTOF10: 8
PAINLEVEL_OUTOF10: 5
PAINLEVEL_OUTOF10: 3

## 2022-07-02 ASSESSMENT — PAIN - FUNCTIONAL ASSESSMENT
PAIN_FUNCTIONAL_ASSESSMENT: ACTIVITIES ARE NOT PREVENTED
PAIN_FUNCTIONAL_ASSESSMENT: PREVENTS OR INTERFERES SOME ACTIVE ACTIVITIES AND ADLS
PAIN_FUNCTIONAL_ASSESSMENT: PREVENTS OR INTERFERES SOME ACTIVE ACTIVITIES AND ADLS

## 2022-07-02 ASSESSMENT — PAIN DESCRIPTION - LOCATION
LOCATION: BACK
LOCATION: HEAD
LOCATION: BACK

## 2022-07-02 ASSESSMENT — PAIN DESCRIPTION - DESCRIPTORS
DESCRIPTORS: ACHING
DESCRIPTORS: ACHING
DESCRIPTORS: ACHING;DISCOMFORT
DESCRIPTORS: ACHING
DESCRIPTORS: ACHING

## 2022-07-02 ASSESSMENT — PAIN DESCRIPTION - ONSET: ONSET: ON-GOING

## 2022-07-02 ASSESSMENT — PAIN DESCRIPTION - FREQUENCY: FREQUENCY: INTERMITTENT

## 2022-07-02 ASSESSMENT — PAIN DESCRIPTION - PAIN TYPE: TYPE: CHRONIC PAIN

## 2022-07-02 NOTE — PROGRESS NOTES
IM Progress Note  Dr. Rene Dural for Dr Dorothy Severe  7/2/2022 7:46 AM      Patient name Laura Dumont  [id number]  PCP: YOBANY Morales CNP  Admit Date: 7/1/2022  Acct No. [de-identified]    Subjective: Interval History:   Pt lying in bed  No sob or cp   Pain tolerable at rest       Diet: ADULT DIET; Regular    I/O last 3 completed shifts: In: 1300 [P.O.:100; I.V.:1200]  Out: 570 [Drains:320; Blood:250]  No intake/output data recorded. Admission weight: 205 lb (93 kg) as of 7/1/2022  6:59 AM  Wt Readings from Last 3 Encounters:   07/01/22 209 lb (94.8 kg)   06/22/22 211 lb (95.7 kg)   06/20/22 214 lb 6.4 oz (97.3 kg)     Body mass index is 34.78 kg/m². ROS   CVS;  no cp or palpitation  Resp: no SOB or cough  Neuro:  No numbness or weakness or dizziness  Abd: no nausea or vomiting or abd pain      Medications:   Scheduled Meds:   busPIRone  10 mg Oral BID    losartan  50 mg Oral Nightly    metoprolol tartrate  25 mg Oral BID    pantoprazole  40 mg Oral Daily    levothyroxine  125 mcg Oral Daily    sodium chloride flush  5-40 mL IntraVENous 2 times per day    polyethylene glycol  17 g Oral Daily    liothyronine  5 mcg Oral QAM    And    liothyronine  2.5 mcg Oral Nightly    sennosides-docusate sodium  2 tablet Oral BID    bisacodyl  10 mg Oral Daily    naloxegol  12.5 mg Oral QAM AC     Continuous Infusions:   sodium chloride      sodium chloride 125 mL/hr at 07/01/22 2327       Labs :     CBC:   Recent Labs     07/02/22  0552   HGB 12.5     BMP:    Recent Labs     07/02/22  0552      K 3.5      CO2 27   BUN 12   CREATININE 0.7   GLUCOSE 108     Hepatic: No results for input(s): AST, ALT, ALB, BILITOT, ALKPHOS in the last 72 hours. Troponin: No results for input(s): TROPONINI in the last 72 hours. BNP: No results for input(s): BNP in the last 72 hours. Lipids: No results for input(s): CHOL, HDL in the last 72 hours.     Invalid input(s): LDLCALCU  INR: No results for input(s): INR in the last 72 hours.     Radiology    Objective:   Vitals: /75   Pulse 85   Temp 98.2 °F (36.8 °C) (Oral)   Resp 18   Ht 5' 5\" (1.651 m)   Wt 209 lb (94.8 kg)   LMP 02/05/2016   SpO2 95%   BMI 34.78 kg/m²   HEENT: Head:pupils react  Neck: supple  Lungs: clear to auscultation  Heart: regular rate and rhythm   Abdomen: soft BS heard obese NG NT  Extremities: warm  edema  Neurologic:  Alert, oriented X3    Impression:   :   S/p L4-5 Decompression  L4-5 PSF  HTN  Thyroid Ca s/o thyroidectomy  Hyperlipidemia  Obesity     Plan:    Wean narcotics  Early ambulation and bowel program tocont  Cont home BP meds   Cont thyroid replacement  Incentive spirometry      Carmine Terrell MD, MD

## 2022-07-02 NOTE — PROGRESS NOTES
>/=3/5, generalized weakness    Balance:  Static Sitting Balance:  Stand By Assistance  Static Standing Balance: Stand By Assistance, assist to cricket lumbar brace, BUE support on RW    Bed Mobility:  Rolling to Left: Contact Guard Assistance   Supine to Sit: Minimal Assistance  Scooting: Contact Guard Assistance    Transfers:  Sit to Stand: Contact Guard Assistance  Stand to Sit:Stand By Assistance  Cues for hand placement  Ambulation:  Contact Guard Assistance, to SBA  Distance: 3'x1, 60'x1  Surface: Level Tile  Device:Rolling Walker  Gait Deviations: Forward Flexed Posture, Slow Aniya, Decreased Step Length Bilaterally, Mild Path Deviations and no LOB    Verbally reviewed with PT demonstration safe technique for car transfer and platform step with RW-pt and spouse verbalized understanding. Reviewed back precautions and good body mechanics and pt and spouse verbalized understanding. Functional Outcome Measures: Completed  AM-PAC Inpatient Mobility Raw Score : 18  AM-PAC Inpatient T-Scale Score : 43.63    ASSESSMENT:  Activity Tolerance:  Patient tolerance of  treatment: good. Treatment Initiated: Treatment and education initiated within context of evaluation. Evaluation time included review of current medical information, gathering information related to past medical, social and functional history, completion of standardized testing, formal and informal observation of tasks, assessment of data and development of plan of care and goals. Treatment time included skilled education and facilitation of tasks to increase safety and independence with functional mobility for improved independence and quality of life. Assessment:   Body Structures, Functions, Activity Limitations Requiring Skilled Therapeutic Intervention: Decreased functional mobility ,Decreased strength,Decreased endurance,Decreased tolerance to work activity,Decreased balance,Increased pain  Assessment: pt s/p back surgery with back

## 2022-07-02 NOTE — PROGRESS NOTES
Department of Orthopedic Surgery  Spine Service  Attending Progress Note        Subjective:  POD#1 patient doing well, c/o surgical site pain. Denies leg pain or N/T. Patient has only been out of bed to bedside commode, voiding without issue. No BM. (+) flatus    Vitals  VITALS:  /75   Pulse 85   Temp 98.2 °F (36.8 °C) (Oral)   Resp 18   Ht 5' 5\" (1.651 m)   Wt 209 lb (94.8 kg)   LMP 02/05/2016   SpO2 95%   BMI 34.78 kg/m²   24HR INTAKE/OUTPUT:    Intake/Output Summary (Last 24 hours) at 7/2/2022 0654  Last data filed at 7/2/2022 8695  Gross per 24 hour   Intake 1300 ml   Output 570 ml   Net 730 ml     URINARY CATHETER OUTPUT (Ladd):     DRAIN/TUBE OUTPUT:  Closed/Suction Drain Right Back Accordion-Output (ml): 30 ml  Closed/Suction Drain Left Back Accordion-Output (ml): 150 ml    PHYSICAL EXAM:    Orientation:  alert and oriented to person, place and time    Incision:  dressing in place, clean, dry, intact    Lower Extremity Motor :  quadriceps, extensor hallucis longus, dorsiflexion, plantarflexion 5/5 bilaterally  Lower Extremity Sensory:  Intact L1-S1    Flatus:  positive    LABS:    HgB:    Lab Results   Component Value Date/Time    HGB 12.5 07/02/2022 05:52 AM     Hemoglobin/Hematocrit:    Lab Results   Component Value Date/Time    HGB 12.5 07/02/2022 05:52 AM    HCT 39.8 07/02/2022 05:52 AM     BMP:    Lab Results   Component Value Date/Time     07/02/2022 05:52 AM    K 3.5 07/02/2022 05:52 AM    K 4.3 07/23/2019 04:37 AM     07/02/2022 05:52 AM    CO2 27 07/02/2022 05:52 AM    BUN 12 07/02/2022 05:52 AM    LABALBU 4.3 11/21/2020 07:37 AM    CREATININE 0.7 07/02/2022 05:52 AM    CALCIUM 9.1 07/02/2022 05:52 AM    LABGLOM 86 07/02/2022 05:52 AM    GLUCOSE 108 07/02/2022 05:52 AM       ASSESSMENT AND PLAN:    Post operative day 1 status post L4-5 decompression and fusion    1:  Monitor labs and drain output  2:  Activity Level:  As tolerated.  PT/OT  3:  Pain Control:  Good  4:

## 2022-07-02 NOTE — PROGRESS NOTES
[address]  INPATIENT OCCUPATIONAL THERAPY  [facility] ORTHOPEDICS 7K  EVALUATION    Time:   Time In: 81  Time Out: 9631  Timed Code Treatment Minutes: 35 Minutes  Minutes: 43          Date: 2022  Patient Name: Ayana Peck,   Gender: female      MRN: [id number]  : 1965  (65 y.o.)  Referring Practitioner: KAREN Fuchs  Diagnosis: lumbar stenosis with neurogentic claudication  Additional Pertinent Hx: per chart review; \"The patient is a 52-year-old female with complaints of constant low back pain into the right buttock and down the posterior leg. Symptoms have been present on a chronci basis for 2+ years. No injury or fall. Current VAS score 5/10. Percentage wise, 50% pain in the back and 50% right leg. Standing and walking aggravates his pain. Sitting helps relieve his pain. Modifying factors include medication management and PT which have provided really no relief. No bowel or bladder incontinence. No previous spinal surgeries. Nonsmoker. \" patient underwent a L4-5 DECOMPRESSION L4-5 PSF on 22 by Dr. Shonda Bray. Restrictions/Precautions:  Restrictions/Precautions: Fall Risk,General Precautions,Surgical Protocols  Required Braces or Orthoses  Spinal: Lumbar Corset  Position Activity Restriction  Spinal Precautions: No Bending,No Lifting,No Twisting    Subjective  Chart Reviewed: Yes,Orders,Progress Notes,History and Physical,Imaging,Operative Notes  Patient assessed for rehabilitation services?: Yes  Family / Caregiver Present: No    Subjective: RN approved session, patient supine in bed upon OT arrival and agreeable to eval. initial flat affect. A & O x 4-able to recall spinal precautions. Pain: 7/10: lumbar region with movement, reported no pain at rest in bed. Vitals: Vitals not assessed per clinical judgement, see nursing flowsheet    Social/Functional History:  Lives With: Spouse  Home Layout: Two level (bedroom upstairs, bathroom downstairs.  states she will sleep in the recliner at home)  Home Access: Stairs to enter without rails  Entrance Stairs - Number of Steps: 1+1 at front door and 3 at back door  Home Equipment:  (used no AD prior to admit)   Bathroom Shower/Tub: Tub/Shower unit  Bathroom Toilet: Handicap height  Bathroom Accessibility: Accessible       ADL Assistance: Independent  Homemaking Assistance: Independent  Ambulation Assistance: Independent  Transfer Assistance: Independent    Active : Yes  Occupation: Full time employment  Type of Occupation: works at "[employer]  Additional Comments: reports she purchased a 2 w/w prior to recent procedure. VISION:Corrected    HEARING:  WFL    COGNITION: WFL    RANGE OF MOTION:  Right Upper Extremity: WFL  Left Upper Extremity:  WFL    STRENGTH:  Right Upper Extremity: WFL  Left Upper Extremity:  WFL    SENSATION:   WFL    ADL:   Grooming: Contact Guard Assistance. to stand at sink to complete hand washing and oral care with cues for 2 w/w placement, applied deodorant with s/u following UB bathing seated in recliner   Bathing: Stand By Assistance. for UB, MIN  A for back, stood with CGA and use of 2 w/w for support to bathe deven area with SBA and cues for not twisting to wash buttock  Upper Extremity Dressing: Minimal Assistance. to change hospital gown   Lower Extremity Dressing: Stand By Assistance. to doff/don slipper sock seated in recliner with patient able to cross leg to rest on lap to reach sock within spinal precautions   Toileting: Stand By Assistance. for clothing mgmt and toilet hygiene for urine. Toilet Transfer: Contact Guard Assistance. with use of grab bars and able to lower self onto toilet slowly and safely. Donned lumbar corset with MOD A and edu on orientation of brace, when to wear and how to don. BALANCE:  Sitting Balance:  Supervision. .  Standing Balance: Contact Guard Assistance.  with use of 2 w/w for support    BED MOBILITY:  Supine to Sit: Minimal Assistance cues for log rolling tech and use of bedrails    TRANSFERS:  Sit to Stand:  Minimal Assistance. cues for hand placement and tech  Stand to Sit: Minimal Assistance. to slow post LOB when backing up to recliner     FUNCTIONAL MOBILITY:  Assistive Device: Rolling Walker  Assist Level:  Contact Guard Assistance. Distance: To and from bathroom       Activity Tolerance:  Patient tolerance of  treatment: good. motivated to participate in OT, no c/o fatigue, SOB. Mild pain in lumbar region. Assessment:  Assessment: patient demonstrates de-conditioning from recent procedure for lumbar stenosis and now  has spinal precautions impacting her ability to complete ADLs and functional transfers as prior. patient would benefit from continued, skilled OT to increase activity tolerance, AE training, increase ease and (I) with ADLs and functional transfers to safely transition to prior living environment, decrease caregiver burden and increase occupational performance. Performance deficits / Impairments: Decreased functional mobility ,Decreased ADL status,Decreased endurance  Prognosis: Good  REQUIRES OT FOLLOW-UP: Yes  Decision Making: Low Complexity    Treatment Initiated: Treatment and education initiated within context of evaluation. Evaluation time included review of current medical information, gathering information related to past medical, social and functional history, completion of standardized testing, formal and informal observation of tasks, assessment of data and development of plan of care and goals. Treatment time included skilled education and facilitation of tasks to increase safety and independence with ADL's for improved functional independence and quality of life.     Discharge Recommendations:  Continue to assess pending progress,Patient would benefit from continued therapy after discharge,Home with assist PRN,Home with Home health OT    Patient Education:     Patient Education  Education Given To: Patient  Education Provided: Role of Therapy,ADL Adaptive Strategies,Fall Prevention Strategies,Plan of Care,Transfer Training,Precautions  Barriers to Learning: None    Equipment Recommendations:  Equipment Needed: Yes  Mobility Devices: ADL Assistive Devices    Plan:  Times per Week: 6x  Times per Day: Daily  Current Treatment Recommendations: Balance training,Functional mobility training,Endurance training,Safety education & training,Neuromuscular re-education,Equipment evaluation, education, & procurement,Patient/Caregiver education & training,Self-Care / ADL. See long-term goal time frame for expected duration of plan of care. If no long-term goals established, a short length of stay is anticipated. Goals:  Patient goals : return home at [facility]  Short Term Goals  Time Frame for Short term goals: by discharge  Short Term Goal 1: patient will tolerate 6 min functional standing with two hand release with (S) to increase activity tolerance for grooming and toileting. Short Term Goal 2: patient will complete LB dressing with (S) with use of AE PRN and 0-1 verbal cues for spinal precautions. Short Term Goal 3: patient will functionally ambulate house hold distances with (S) with 0-1 verbal cues for safety and sequencing. Short Term Goal 4: patient will complete toileting and grooming with (S). Following session, patient left in safe position with all fall risk precautions in place.

## 2022-07-03 VITALS
SYSTOLIC BLOOD PRESSURE: 120 MMHG | OXYGEN SATURATION: 94 % | HEIGHT: 65 IN | WEIGHT: 209 LBS | DIASTOLIC BLOOD PRESSURE: 58 MMHG | HEART RATE: 88 BPM | TEMPERATURE: 98.6 F | BODY MASS INDEX: 34.82 KG/M2 | RESPIRATION RATE: 17 BRPM

## 2022-07-03 LAB
ANION GAP SERPL CALCULATED.3IONS-SCNC: 10 MEQ/L (ref 8–16)
BUN BLDV-MCNC: 13 MG/DL (ref 7–22)
CALCIUM SERPL-MCNC: 8.9 MG/DL (ref 8.5–10.5)
CHLORIDE BLD-SCNC: 106 MEQ/L (ref 98–111)
CO2: 26 MEQ/L (ref 23–33)
CREAT SERPL-MCNC: 0.7 MG/DL (ref 0.4–1.2)
GFR SERPL CREATININE-BSD FRML MDRD: 86 ML/MIN/1.73M2
GLUCOSE BLD-MCNC: 106 MG/DL (ref 70–108)
HCT VFR BLD CALC: 34.9 % (ref 37–47)
HEMOGLOBIN: 11.1 GM/DL (ref 12–16)
POTASSIUM SERPL-SCNC: 4.1 MEQ/L (ref 3.5–5.2)
SODIUM BLD-SCNC: 142 MEQ/L (ref 135–145)
TSH SERPL DL<=0.05 MIU/L-ACNC: 0.21 UIU/ML (ref 0.4–4.2)

## 2022-07-03 PROCEDURE — 36415 COLL VENOUS BLD VENIPUNCTURE: CPT

## 2022-07-03 PROCEDURE — 6370000000 HC RX 637 (ALT 250 FOR IP): Performed by: PHYSICIAN ASSISTANT

## 2022-07-03 PROCEDURE — 2580000003 HC RX 258: Performed by: PHYSICIAN ASSISTANT

## 2022-07-03 PROCEDURE — 6360000002 HC RX W HCPCS: Performed by: PHYSICIAN ASSISTANT

## 2022-07-03 PROCEDURE — 80048 BASIC METABOLIC PNL TOTAL CA: CPT

## 2022-07-03 PROCEDURE — 6370000000 HC RX 637 (ALT 250 FOR IP): Performed by: INTERNAL MEDICINE

## 2022-07-03 PROCEDURE — 85014 HEMATOCRIT: CPT

## 2022-07-03 PROCEDURE — 85018 HEMOGLOBIN: CPT

## 2022-07-03 PROCEDURE — 84443 ASSAY THYROID STIM HORMONE: CPT

## 2022-07-03 RX ADMIN — OXYCODONE 5 MG: 5 TABLET ORAL at 06:41

## 2022-07-03 RX ADMIN — LEVOTHYROXINE SODIUM 125 MCG: 0.12 TABLET ORAL at 06:42

## 2022-07-03 RX ADMIN — DOCUSATE SODIUM 50 MG AND SENNOSIDES 8.6 MG 2 TABLET: 8.6; 5 TABLET, FILM COATED ORAL at 08:28

## 2022-07-03 RX ADMIN — PANTOPRAZOLE SODIUM 40 MG: 40 TABLET, DELAYED RELEASE ORAL at 08:29

## 2022-07-03 RX ADMIN — POLYETHYLENE GLYCOL 3350 17 G: 17 POWDER, FOR SOLUTION ORAL at 08:29

## 2022-07-03 RX ADMIN — LIOTHYRONINE SODIUM 5 MCG: 5 TABLET ORAL at 08:38

## 2022-07-03 RX ADMIN — BUSPIRONE HYDROCHLORIDE 10 MG: 10 TABLET ORAL at 08:29

## 2022-07-03 RX ADMIN — HYDROMORPHONE HYDROCHLORIDE 0.25 MG: 1 INJECTION, SOLUTION INTRAMUSCULAR; INTRAVENOUS; SUBCUTANEOUS at 08:29

## 2022-07-03 RX ADMIN — SODIUM CHLORIDE, PRESERVATIVE FREE 10 ML: 5 INJECTION INTRAVENOUS at 08:29

## 2022-07-03 RX ADMIN — NALOXEGOL OXALATE 12.5 MG: 12.5 TABLET, FILM COATED ORAL at 06:42

## 2022-07-03 RX ADMIN — BISACODYL 10 MG: 5 TABLET, COATED ORAL at 08:29

## 2022-07-03 RX ADMIN — METOPROLOL TARTRATE 25 MG: 25 TABLET, FILM COATED ORAL at 08:29

## 2022-07-03 ASSESSMENT — PAIN DESCRIPTION - ORIENTATION
ORIENTATION: MID
ORIENTATION: MID

## 2022-07-03 ASSESSMENT — PAIN DESCRIPTION - FREQUENCY: FREQUENCY: INTERMITTENT

## 2022-07-03 ASSESSMENT — PAIN DESCRIPTION - LOCATION
LOCATION: BACK
LOCATION: BACK

## 2022-07-03 ASSESSMENT — PAIN - FUNCTIONAL ASSESSMENT: PAIN_FUNCTIONAL_ASSESSMENT: PREVENTS OR INTERFERES SOME ACTIVE ACTIVITIES AND ADLS

## 2022-07-03 ASSESSMENT — PAIN SCALES - GENERAL
PAINLEVEL_OUTOF10: 6
PAINLEVEL_OUTOF10: 5

## 2022-07-03 ASSESSMENT — PAIN DESCRIPTION - DESCRIPTORS
DESCRIPTORS: ACHING
DESCRIPTORS: ACHING

## 2022-07-03 ASSESSMENT — PAIN DESCRIPTION - ONSET: ONSET: ON-GOING

## 2022-07-03 ASSESSMENT — PAIN DESCRIPTION - PAIN TYPE: TYPE: SURGICAL PAIN

## 2022-07-03 NOTE — CARE COORDINATION
7/3/22, 11:02 AM EDT    Patient goals/plan/ treatment preferences discussed by  and . Patient goals/plan/ treatment preferences reviewed with patient/ family. Patient/ family verbalize understanding of discharge plan and are in agreement with goal/plan/treatment preferences. Understanding was demonstrated using the teach back method. AVS provided by RN at time of discharge, which includes all necessary medical information pertaining to the patients current course of illness, treatment, post-discharge goals of care, and treatment preferences. Services At/After Discharge: Home Health   Pt to be discharged to home with [facility].

## 2022-07-03 NOTE — PROGRESS NOTES
Department of Orthopedic Surgery  Spine Service  Attending Progress Note        Subjective:  POD#2 c/o back pain, denies leg pain or N/T. Ambulating in halls. No issues voiding. No BM. (+) flatus. Vitals  VITALS:  /61   Pulse 90   Temp 98.1 °F (36.7 °C)   Resp 18   Ht 5' 5\" (1.651 m)   Wt 209 lb (94.8 kg)   LMP 02/05/2016   SpO2 96%   BMI 34.78 kg/m²   24HR INTAKE/OUTPUT:      Intake/Output Summary (Last 24 hours) at 7/3/2022 4561  Last data filed at 7/3/2022 4689  Gross per 24 hour   Intake 430 ml   Output 330 ml   Net 100 ml     URINARY CATHETER OUTPUT (Ladd):     DRAIN/TUBE OUTPUT:  Closed/Suction Drain Right Back Accordion-Output (ml): 10 ml  Closed/Suction Drain Left Back Accordion-Output (ml): 170 ml    PHYSICAL EXAM:    Orientation:  alert and oriented to person, place and time    Incision:  dressing in place, clean, dry, intact    Lower Extremity Motor :  quadriceps, extensor hallucis longus, dorsiflexion, plantarflexion 5/5 bilaterally  Lower Extremity Sensory:  Intact L1-S1    Flatus:  positive    LABS:    HgB:    Lab Results   Component Value Date/Time    HGB 11.1 07/03/2022 04:51 AM     Hemoglobin/Hematocrit:    Lab Results   Component Value Date/Time    HGB 11.1 07/03/2022 04:51 AM    HCT 34.9 07/03/2022 04:51 AM     BMP:    Lab Results   Component Value Date/Time     07/03/2022 04:51 AM    K 4.1 07/03/2022 04:51 AM    K 4.3 07/23/2019 04:37 AM     07/03/2022 04:51 AM    CO2 26 07/03/2022 04:51 AM    BUN 13 07/03/2022 04:51 AM    LABALBU 4.3 11/21/2020 07:37 AM    CREATININE 0.7 07/03/2022 04:51 AM    CALCIUM 8.9 07/03/2022 04:51 AM    LABGLOM 86 07/03/2022 04:51 AM    GLUCOSE 106 07/03/2022 04:51 AM       ASSESSMENT AND PLAN:    Post operative day 2 status post L4-5 decompression and fusion    1:  Monitor labs and drain output  2:  Activity Level:  As tolerated.  PT/OT  3:  Pain Control:  Good  4:  Discharge Planning:  Pending - home today with [facility] pending     Shabbir Aguilar

## 2022-07-03 NOTE — PROGRESS NOTES
IM Progress Note  Dr. Nicole Cisneros for Dr Zuri Littlejohn  7/3/2022 10:07 AM      Patient name Dallas CASTILLO[id number]  PCP: YOBANY Berg CNP  Admit Date: 7/1/2022  Acct No. [de-identified]    Subjective: Interval History:   Up in chair  No BM yet   + flatus   Pain only with movement    Diet: ADULT DIET; Regular    I/O last 3 completed shifts: In: 430 [P.O.:430]  Out: 330 [Drains:330]  I/O this shift:  In: 236 [P.O.:236]  Out: 180 [Drains:180]        Admission weight: 205 lb (93 kg) as of 7/1/2022  6:59 AM  Wt Readings from Last 3 Encounters:   07/01/22 209 lb (94.8 kg)   06/22/22 211 lb (95.7 kg)   06/20/22 214 lb 6.4 oz (97.3 kg)     Body mass index is 34.78 kg/m². ROS   CVS;  no cp or palpitation  Resp: no SOB or cough  Neuro:  No numbness or weakness or dizziness  Abd: no nausea or vomiting or abd pain      Medications:   Scheduled Meds:   busPIRone  10 mg Oral BID    losartan  50 mg Oral Nightly    metoprolol tartrate  25 mg Oral BID    pantoprazole  40 mg Oral Daily    levothyroxine  125 mcg Oral Daily    sodium chloride flush  5-40 mL IntraVENous 2 times per day    polyethylene glycol  17 g Oral Daily    liothyronine  5 mcg Oral QAM    And    liothyronine  2.5 mcg Oral Nightly    sennosides-docusate sodium  2 tablet Oral BID    bisacodyl  10 mg Oral Daily    naloxegol  12.5 mg Oral QAM AC     Continuous Infusions:   sodium chloride      sodium chloride 125 mL/hr at 07/01/22 2327       Labs :     CBC:   Recent Labs     07/02/22  0552 07/03/22  0451   HGB 12.5 11.1*     BMP:    Recent Labs     07/02/22  0552 07/03/22  0451    142   K 3.5 4.1    106   CO2 27 26   BUN 12 13   CREATININE 0.7 0.7   GLUCOSE 108 106     Hepatic: No results for input(s): AST, ALT, ALB, BILITOT, ALKPHOS in the last 72 hours. Troponin: No results for input(s): TROPONINI in the last 72 hours. BNP: No results for input(s): BNP in the last 72 hours.   Lipids: No results for input(s): CHOL, HDL in the last 72 hours.    Invalid input(s): LDLCALCU  INR: No results for input(s): INR in the last 72 hours.     Radiology    Objective:   Vitals: /72   Pulse 88   Temp 99 °F (37.2 °C) (Oral)   Resp 17   Ht 5' 5\" (1.651 m)   Wt 209 lb (94.8 kg)   LMP 02/05/2016   SpO2 99%   BMI 34.78 kg/m²   HEENT: Head:pupils react  Neck: supple  Lungs: clear to auscultation  Heart: regular rate and rhythm   Abdomen: soft BS heard obese NG NT, drain in place  Extremities: warm  edema  Neurologic:  Alert, oriented X3    Impression:   :   S/p L4-5 Decompression  L4-5 PSF  HTN  Thyroid Ca s/o thyroidectomy  Hyperlipidemia  Obesity     Plan:    Cont PT OT   Cont antihypertensives   TSH suppressed but pt has h/o  thyroid Ca    Bowel program to continue  ambulate      Ehsan Gutiérrez MD, MD

## 2022-07-05 ENCOUNTER — TELEPHONE (OUTPATIENT)
Dept: FAMILY MEDICINE CLINIC | Age: 57
End: 2022-07-05

## 2022-07-05 NOTE — TELEPHONE ENCOUNTER
[facility] Transitions Initial Follow Up Call    Outreach made within 2 business days of discharge: Yes    Patient: Wilmer Ashton Patient : 1965   MRN: <[id number]>  Reason for Admission: Patient has L4-L5 decompression. Patient states she has 4/10 pain currently. Was given pain medication from hospital.  Discharge Date: 7/3/22       Spoke with: Patient    Discharge department/facility: [facility] Interactive Patient Contact:  Was patient able to fill all prescriptions: Yes  Was patient instructed to bring all medications to the follow-up visit: Yes  Is patient taking all medications as directed in the discharge summary?  Yes  Does patient understand their discharge instructions: Yes  Does patient have questions or concerns that need addressed prior to 7-14 day follow up office visit: no    Scheduled appointment with PCP within 7-14 days    Follow Up  Future Appointments   Date Time Provider Glenroy Dorantes   2022  3:00 PM YOBANY Rosado CNP [facility]   2022  2:00 PM STR MRI RM1 STRZ MRI STR Radiolog   2022  3:40 PM YOBANY Rosado CNP [facility]   3/6/2023  8:45 AM Siobhan Torres MD N [facility] Heart P - Raghu Yusuf LPN

## 2022-07-07 NOTE — DISCHARGE SUMMARY
Discharge Summary        Date of Admission: 07/01/2022  Date of Discharge: 07/03/2022    Admitting Physician: Dr. Aysha Martinez: GERA     DATE OF PROCEDURE: 7/1/2022     PREOPERATIVE DIAGNOSES:  1.  L4-5 spondylolisthesis, grade 1  2.  L4-5 lumbar stenosis with radiculopathy  3.  Obesity, BMI 34.78     POSTOPERATIVE DIAGNOSES:  1.  L4-5 spondylolisthesis, grade 1  2.  L4-5 lumbar stenosis with radiculopathy  3.  Obesity, BMI 34.78     OPERATION PERFORMED:  1.  L4-5 bilateral laminectomy, partial medial facetectomies and foraminotomies of L4 and L5  2.  L4-5 posterior spinal fusion. 3.  L4-5 posterior spinal instrumentation, Streamline, SurgAlign instrumentation. 4.  Use of local autograft bone and 24cc BioAdapt Bridge      SURGEON: Dave Gomes MD    INDICATIONS: Jovan Kaplan is a 65 y.o. female with refractory back and right leg pain from degenerative spondylolisthesis and lumbar stenosis L4-5.  Patient had failed full conservative therapy including medication management, physical therapy. Due to the persistence of symptoms and reduction in the ADLs, patient elected surgical treatment. Patient, therefore, understood indications for the surgery as well as its risks, benefits, and alternatives.  These risks include but are not limited to paralysis, infection, hematoma, dural tear, nerve root injury, nonunion, DVT/PE, stroke, MI, death etc.  All questions were answered. Informed consent was obtained. HOSPITAL COURSE:   The patient was admitted on the above date had the above procedure performed. Patient had no complications during surgery. The patient was then transferred to the PACU and to a regular nursing floor for postop care. The patient's pain was initially controlled with IV medications, but we were able to transition to oral pain medications soon after arrival to the floor. Their pain remained under good control through their hospital stay. Internal Medicine was consulted for medical management. Patient had improvement of their radicular symptoms. She was up and ambulating with PT on POD#1. POD#2 patient continued to do well, main complaint was back pain, improved radicular sx. Ambulating well. No issues voiding. Hgb stable at 11.1. The patient had a bowel movement before discharge. Hemovac drain kept in place. The patient was seen by Physical Therapy and was found to be an ideal candidate for discharge to home. The patient was then safely discharged to home on the above date. PHYSICAL EXAMINATION ON DISCHARGE:   The patient was afebrile, stable. Dressing was clean, dry and intact. 5/5 strength in bilateral lower extremities. Sensory intact to touch bilateral LE. hemovac in place and functioning      DISCHARGE INSTRUCTIONS:   Patient can resume regular diet. Resume home medications except for NSAIDs or blood thinners. Resume asa POD#2 and all other blood thinners POD#5. Okay to resume NSAIDs 6 months after surgery. Take previously prescribed narcotic pain medications as directed. Change the dressing daily until the incision is clean and dry and then leave open to air. Okay to take a shower without submerging the incision. Wear the brace at all times when up and ambulating. Limit any heavy lifting, bending or twisting until first postoperative visit. Patient to follow up with Dr. Martina Fox 6 weeks post discharge as scheduled.     CONDITION AT DISCHARGE:  Stable    DISPOSITION:   Home with [place] for drain management    KAREN Huynh

## 2022-07-13 ENCOUNTER — OFFICE VISIT (OUTPATIENT)
Dept: FAMILY MEDICINE CLINIC | Age: 57
End: 2022-07-13
Payer: COMMERCIAL

## 2022-07-13 VITALS
BODY MASS INDEX: 35.48 KG/M2 | HEART RATE: 85 BPM | TEMPERATURE: 97.7 F | WEIGHT: 213.2 LBS | SYSTOLIC BLOOD PRESSURE: 120 MMHG | RESPIRATION RATE: 16 BRPM | DIASTOLIC BLOOD PRESSURE: 76 MMHG | OXYGEN SATURATION: 96 %

## 2022-07-13 DIAGNOSIS — M48.061 SPINAL STENOSIS OF LUMBAR REGION, UNSPECIFIED WHETHER NEUROGENIC CLAUDICATION PRESENT: ICD-10-CM

## 2022-07-13 DIAGNOSIS — Z09 HOSPITAL DISCHARGE FOLLOW-UP: Primary | ICD-10-CM

## 2022-07-13 PROCEDURE — 1111F DSCHRG MED/CURRENT MED MERGE: CPT | Performed by: NURSE PRACTITIONER

## 2022-07-13 PROCEDURE — 99496 TRANSJ CARE MGMT HIGH F2F 7D: CPT | Performed by: NURSE PRACTITIONER

## 2022-07-13 RX ORDER — CYCLOBENZAPRINE HCL 10 MG
1 TABLET ORAL 3 TIMES DAILY PRN
COMMUNITY
Start: 2022-06-30

## 2022-07-13 RX ORDER — OXYCODONE HYDROCHLORIDE AND ACETAMINOPHEN 5; 325 MG/1; MG/1
1 TABLET ORAL EVERY 6 HOURS PRN
COMMUNITY
Start: 2022-06-30

## 2022-07-13 ASSESSMENT — PATIENT HEALTH QUESTIONNAIRE - PHQ9
2. FEELING DOWN, DEPRESSED OR HOPELESS: 0
SUM OF ALL RESPONSES TO PHQ QUESTIONS 1-9: 0
SUM OF ALL RESPONSES TO PHQ QUESTIONS 1-9: 0
SUM OF ALL RESPONSES TO PHQ9 QUESTIONS 1 & 2: 0
1. LITTLE INTEREST OR PLEASURE IN DOING THINGS: 0
SUM OF ALL RESPONSES TO PHQ QUESTIONS 1-9: 0
SUM OF ALL RESPONSES TO PHQ QUESTIONS 1-9: 0

## 2022-07-13 NOTE — PROGRESS NOTES
Post-Discharge Transitional Care Follow Up      Melodie Flores   YOB: 1965    Date of Office Visit:  7/13/2022  Date of Hospital Admission: 7/1/22  Date of Hospital Discharge: 7/3/22  Readmission Risk Score (high >=14%. Medium >=10%):Readmission Risk Score: 6.7 ( )      Care management risk score Rising risk (score 2-5) and Complex Care (Scores >=6): No Risk Score On File     Non face to face  following discharge, date last encounter closed (first attempt may have been earlier): *No documented post hospital discharge outreach found in the last 14 days     Call initiated 2 business days of discharge: *No response recorded in the last 14 days     Hospital discharge follow-up  -     OR DISCHARGE MEDS RECONCILED W/ CURRENT OUTPATIENT MED LIST  Spinal stenosis of lumbar region, unspecified whether neurogenic claudication present      Medical Decision Making: moderate complexity  Return if symptoms worsen or fail to improve. Subjective:   Lumbar decompression  No pain medication (percocet) today only need muscle relaxer (flexeril- not taking baclofen)  Has back brace and walker  Has a follow up apt August 15th  Appetite- good  No fever sweats or chills  No constipation   MRI scheduled for 7/19  Has a visiting nurse coming out and she was doing dressing changes   Has shower chair and toilet advise to help with ADL       Inpatient course: Discharge summary reviewed- see chart.     Interval history/Current status: stable    Patient Active Problem List   Diagnosis    Anxiety    Fibroid, uterine    Intermittent palpitations    Family history of premature CAD-father and two brother had mi at age 62   [facility] Abnormal stress ECG-st depression of 0.6    Essential hypertension    Seasonal allergies    Osteopenia after menopause    Class 1 obesity due to excess calories with serious comorbidity and body mass index (BMI) of 33.0 to 33.9 in adult    Primary thyroid cancer ([address]    Papillary tablet  Generic drug: levothyroxine     triamcinolone 0.1 % ointment  Commonly known as: KENALOG  Apply topically 2 times daily as needed (eczema)     Vitamin C 500 MG Chew     Vitamin D3 125 MCG (5000 UT) Tabs               Medications marked \"taking\" at this time  Outpatient Medications Marked as Taking for the 7/13/22 encounter (Office Visit) with YOBANY Saavedra - CNP   Medication Sig Dispense Refill    oxyCODONE-acetaminophen (PERCOCET) 5-325 MG per tablet Take 1 tablet by mouth every 6 hours as needed for Pain.  cyclobenzaprine (FLEXERIL) 10 MG tablet Take 1 tablet by mouth 3 times daily as needed      triamcinolone (KENALOG) 0.1 % ointment Apply topically 2 times daily as needed (eczema) 30 g 1    calcium carbonate-vitamin D3 (CALCIUM 600+D3) 600-400 MG-UNIT TABS per tab Take 1 tablet by mouth daily      Cholecalciferol (VITAMIN D3) 125 MCG (5000 UT) TABS Take 1 tablet by mouth daily      Ascorbic Acid (VITAMIN C) 500 MG CHEW Take 1 tablet by mouth daily      metoprolol tartrate (LOPRESSOR) 25 MG tablet TAKE 1 TABLET TWICE A  tablet 3    busPIRone (BUSPAR) 10 MG tablet TAKE 1 TABLET TWICE A  tablet 3    baclofen (LIORESAL) 10 MG tablet Take 1 tablet by mouth daily as needed (muscle relaxant) 90 tablet 2    losartan (COZAAR) 50 MG tablet TAKE 1 TABLET DAILY (Patient taking differently: Take 50 mg by mouth nightly ) 90 tablet 3    SYNTHROID 125 MCG tablet Take 1 tablet by mouth daily      liothyronine (CYTOMEL) 5 MCG tablet Take 5 mcg by mouth 2 times daily 1 tablet AM, .5 tab PM      pantoprazole (PROTONIX) 40 MG tablet TAKE 1 TABLET DAILY 90 tablet 3    betamethasone valerate (VALISONE) 0.1 % ointment Apply topically 2 times daily.  (Patient taking differently: 2 times daily as needed Apply topically 2 times daily.) 45 g 0    fexofenadine (ALLEGRA) 180 MG tablet Take 1 tablet by mouth as needed (allergies) (Patient taking differently: Take 180 mg by mouth daily ) 90 tablet normal.      Palpations: Abdomen is soft. Tenderness: There is no abdominal tenderness. Musculoskeletal:         General: Normal range of motion. Cervical back: Normal range of motion and neck supple. No spinous process tenderness. Skin:     General: Skin is warm and dry. Findings: No erythema or rash. Comments:  8 cm healing incision to  Lumbar back    Neurological:      Mental Status: She is alert and oriented to person, place, and time. Psychiatric:         Speech: Speech normal.         Behavior: Behavior normal.         Thought Content:  Thought content normal.       An electronic signature was used to authenticate this note.  --Channing Shelby, APRN - CNP

## 2022-07-24 ASSESSMENT — ENCOUNTER SYMPTOMS
SHORTNESS OF BREATH: 0
EYE DISCHARGE: 0
CONSTIPATION: 0
ABDOMINAL PAIN: 0
VOMITING: 0
COUGH: 0
RHINORRHEA: 0
DIARRHEA: 0
CHEST TIGHTNESS: 0

## 2022-08-16 ENCOUNTER — HOSPITAL ENCOUNTER (OUTPATIENT)
Dept: MRI IMAGING | Age: 57
Discharge: HOME | End: 2022-08-16
Payer: COMMERCIAL

## 2022-08-16 DIAGNOSIS — H91.8X9 ASYMMETRICAL HEARING LOSS: ICD-10-CM

## 2022-08-16 DIAGNOSIS — H90.72 MIXED CONDUCTIVE AND SENSORINEURAL HEARING LOSS OF LEFT EAR, UNSPECIFIED HEARING STATUS ON CONTRALATERAL SIDE: ICD-10-CM

## 2022-08-16 PROCEDURE — 70553 MRI BRAIN STEM W/O & W/DYE: CPT

## 2022-08-16 PROCEDURE — 6360000004 HC RX CONTRAST MEDICATION: Performed by: OTOLARYNGOLOGY

## 2022-08-16 PROCEDURE — A9579 GAD-BASE MR CONTRAST NOS,1ML: HCPCS | Performed by: OTOLARYNGOLOGY

## 2022-08-16 RX ADMIN — GADOTERIDOL 20 ML: 279.3 INJECTION, SOLUTION INTRAVENOUS at 10:15

## 2022-09-06 RX ORDER — PANTOPRAZOLE SODIUM 40 MG/1
TABLET, DELAYED RELEASE ORAL
Qty: 90 TABLET | Refills: 3 | Status: SHIPPED | OUTPATIENT
Start: 2022-09-06

## 2022-11-05 ENCOUNTER — HOSPITAL ENCOUNTER (OUTPATIENT)
Age: 57
Discharge: HOME | End: 2022-11-05
Payer: COMMERCIAL

## 2022-11-05 DIAGNOSIS — I10 ESSENTIAL HYPERTENSION: ICD-10-CM

## 2022-11-05 LAB
ALBUMIN SERPL-MCNC: 4.6 G/DL (ref 3.5–5.1)
ALP BLD-CCNC: 157 U/L (ref 38–126)
ALT SERPL-CCNC: 15 U/L (ref 11–66)
ANION GAP SERPL CALCULATED.3IONS-SCNC: 9 MEQ/L (ref 8–16)
AST SERPL-CCNC: 17 U/L (ref 5–40)
AVERAGE GLUCOSE: 105 MG/DL (ref 70–126)
BASOPHILS # BLD: 0.7 %
BASOPHILS ABSOLUTE: 0 THOU/MM3 (ref 0–0.1)
BILIRUB SERPL-MCNC: 0.5 MG/DL (ref 0.3–1.2)
BUN BLDV-MCNC: 20 MG/DL (ref 7–22)
CALCIUM SERPL-MCNC: 10.2 MG/DL (ref 8.5–10.5)
CHLORIDE BLD-SCNC: 101 MEQ/L (ref 98–111)
CHOLESTEROL, TOTAL: 224 MG/DL (ref 100–199)
CO2: 30 MEQ/L (ref 23–33)
CREAT SERPL-MCNC: 0.8 MG/DL (ref 0.4–1.2)
EOSINOPHIL # BLD: 1.8 %
EOSINOPHILS ABSOLUTE: 0.1 THOU/MM3 (ref 0–0.4)
ERYTHROCYTE [DISTWIDTH] IN BLOOD BY AUTOMATED COUNT: 13.2 % (ref 11.5–14.5)
ERYTHROCYTE [DISTWIDTH] IN BLOOD BY AUTOMATED COUNT: 44.4 FL (ref 35–45)
GFR SERPL CREATININE-BSD FRML MDRD: > 60 ML/MIN/1.73M2
GLUCOSE FASTING: 99 MG/DL (ref 70–108)
HBA1C MFR BLD: 5.5 % (ref 4.4–6.4)
HCT VFR BLD CALC: 45.7 % (ref 37–47)
HDLC SERPL-MCNC: 88 MG/DL
HEMOGLOBIN: 15 GM/DL (ref 12–16)
IMMATURE GRANS (ABS): 0.03 THOU/MM3 (ref 0–0.07)
IMMATURE GRANULOCYTES: 0.5 %
LDL CHOLESTEROL CALCULATED: 127 MG/DL
LYMPHOCYTES # BLD: 26.6 %
LYMPHOCYTES ABSOLUTE: 1.5 THOU/MM3 (ref 1–4.8)
MAGNESIUM: 1.9 MG/DL (ref 1.6–2.4)
MCH RBC QN AUTO: 29.9 PG (ref 26–33)
MCHC RBC AUTO-ENTMCNC: 32.8 GM/DL (ref 32.2–35.5)
MCV RBC AUTO: 91.2 FL (ref 81–99)
MONOCYTES # BLD: 11.8 %
MONOCYTES ABSOLUTE: 0.6 THOU/MM3 (ref 0.4–1.3)
NUCLEATED RED BLOOD CELLS: 0 /100 WBC
PLATELET # BLD: 219 THOU/MM3 (ref 130–400)
PMV BLD AUTO: 10.8 FL (ref 9.4–12.4)
POTASSIUM SERPL-SCNC: 4.6 MEQ/L (ref 3.5–5.2)
RBC # BLD: 5.01 MILL/MM3 (ref 4.2–5.4)
SEG NEUTROPHILS: 58.6 %
SEGMENTED NEUTROPHILS ABSOLUTE COUNT: 3.2 THOU/MM3 (ref 1.8–7.7)
SODIUM BLD-SCNC: 140 MEQ/L (ref 135–145)
T4 FREE: 1.3 NG/DL (ref 0.93–1.76)
TOTAL PROTEIN: 7.4 G/DL (ref 6.1–8)
TRIGL SERPL-MCNC: 45 MG/DL (ref 0–199)
TSH SERPL DL<=0.05 MIU/L-ACNC: 4.9 UIU/ML (ref 0.4–4.2)
VITAMIN D 25-HYDROXY: 46 NG/ML (ref 30–100)
WBC # BLD: 5.5 THOU/MM3 (ref 4.8–10.8)

## 2022-11-05 PROCEDURE — 83036 HEMOGLOBIN GLYCOSYLATED A1C: CPT

## 2022-11-05 PROCEDURE — 84443 ASSAY THYROID STIM HORMONE: CPT

## 2022-11-05 PROCEDURE — 84439 ASSAY OF FREE THYROXINE: CPT

## 2022-11-05 PROCEDURE — 83735 ASSAY OF MAGNESIUM: CPT

## 2022-11-05 PROCEDURE — 36415 COLL VENOUS BLD VENIPUNCTURE: CPT

## 2022-11-05 PROCEDURE — 82306 VITAMIN D 25 HYDROXY: CPT

## 2022-11-05 PROCEDURE — 80061 LIPID PANEL: CPT

## 2022-11-05 PROCEDURE — 80053 COMPREHEN METABOLIC PANEL: CPT

## 2022-11-05 PROCEDURE — 85025 COMPLETE CBC W/AUTO DIFF WBC: CPT

## 2022-11-09 ENCOUNTER — OFFICE VISIT (OUTPATIENT)
Dept: FAMILY MEDICINE CLINIC | Age: 57
End: 2022-11-09
Payer: COMMERCIAL

## 2022-11-09 VITALS
SYSTOLIC BLOOD PRESSURE: 129 MMHG | TEMPERATURE: 97.7 F | OXYGEN SATURATION: 98 % | HEART RATE: 60 BPM | RESPIRATION RATE: 16 BRPM | DIASTOLIC BLOOD PRESSURE: 77 MMHG | BODY MASS INDEX: 35.54 KG/M2 | WEIGHT: 213.6 LBS

## 2022-11-09 DIAGNOSIS — Z00.00 ANNUAL PHYSICAL EXAM: Primary | ICD-10-CM

## 2022-11-09 DIAGNOSIS — I10 ESSENTIAL HYPERTENSION: ICD-10-CM

## 2022-11-09 DIAGNOSIS — E55.9 VITAMIN D DEFICIENCY: ICD-10-CM

## 2022-11-09 DIAGNOSIS — J30.2 SEASONAL ALLERGIES: ICD-10-CM

## 2022-11-09 DIAGNOSIS — Z23 NEED FOR IMMUNIZATION AGAINST INFLUENZA: ICD-10-CM

## 2022-11-09 PROCEDURE — 3074F SYST BP LT 130 MM HG: CPT | Performed by: NURSE PRACTITIONER

## 2022-11-09 PROCEDURE — 3078F DIAST BP <80 MM HG: CPT | Performed by: NURSE PRACTITIONER

## 2022-11-09 PROCEDURE — 90674 CCIIV4 VAC NO PRSV 0.5 ML IM: CPT | Performed by: NURSE PRACTITIONER

## 2022-11-09 PROCEDURE — 99396 PREV VISIT EST AGE 40-64: CPT | Performed by: NURSE PRACTITIONER

## 2022-11-09 PROCEDURE — 90471 IMMUNIZATION ADMIN: CPT | Performed by: NURSE PRACTITIONER

## 2022-11-09 RX ORDER — LEVOTHYROXINE SODIUM 150 MCG
TABLET ORAL
COMMUNITY
Start: 2022-11-08

## 2022-11-09 SDOH — ECONOMIC STABILITY: FOOD INSECURITY: WITHIN THE PAST 12 MONTHS, YOU WORRIED THAT YOUR FOOD WOULD RUN OUT BEFORE YOU GOT MONEY TO BUY MORE.: NEVER TRUE

## 2022-11-09 SDOH — ECONOMIC STABILITY: FOOD INSECURITY: WITHIN THE PAST 12 MONTHS, THE FOOD YOU BOUGHT JUST DIDN'T LAST AND YOU DIDN'T HAVE MONEY TO GET MORE.: NEVER TRUE

## 2022-11-09 ASSESSMENT — ENCOUNTER SYMPTOMS
EYE DISCHARGE: 0
CHEST TIGHTNESS: 0
SHORTNESS OF BREATH: 0
BACK PAIN: 1
VOMITING: 0
CONSTIPATION: 0
RHINORRHEA: 0
COUGH: 0
DIARRHEA: 0
ABDOMINAL PAIN: 0

## 2022-11-09 ASSESSMENT — PATIENT HEALTH QUESTIONNAIRE - PHQ9
SUM OF ALL RESPONSES TO PHQ QUESTIONS 1-9: 0
2. FEELING DOWN, DEPRESSED OR HOPELESS: 0
SUM OF ALL RESPONSES TO PHQ QUESTIONS 1-9: 0
SUM OF ALL RESPONSES TO PHQ QUESTIONS 1-9: 0
SUM OF ALL RESPONSES TO PHQ9 QUESTIONS 1 & 2: 0
SUM OF ALL RESPONSES TO PHQ QUESTIONS 1-9: 0
1. LITTLE INTEREST OR PLEASURE IN DOING THINGS: 0

## 2022-11-09 ASSESSMENT — SOCIAL DETERMINANTS OF HEALTH (SDOH): HOW HARD IS IT FOR YOU TO PAY FOR THE VERY BASICS LIKE FOOD, HOUSING, MEDICAL CARE, AND HEATING?: NOT HARD AT ALL
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
## 2022-11-09 NOTE — PROGRESS NOTES
Vaccine Information Sheet, \"Influenza - Inactivated\"  given to Mars Osorio, or parent/legal guardian of  Mars Osorio and verbalized understanding. Patient responses:    Have you ever had a reaction to a flu vaccine? No  Do you have an allergy to eggs, neomycin or polymixin? No  Do you have an allergy to Thimerosal, contact lens solution, or Merthiolate? No  Have you ever had [place] Syndrome? No  Do you have any current illness? No  Do you have a temperature above 100 degrees? No  Are you pregnant? No  If pregnant, permission obtained from physician? N/A  Do you have an active neurological disorder? No      Flu vaccine given per order. Please see immunization tab. After obtaining consent, and per orders of Juan R Fulton CNP, injection of influenza 0.5mL given in Left deltoid by Marcell Perez MA. Patient instructed to remain in clinic for 20 minutes afterwards, and to report any adverse reaction to me immediately. Immunizations Administered       Name Date Dose Route    Influenza, FLUCELVAX, (age 10 mo+), MDCK, PF, 0.5mL 11/9/2022 0.5 mL Intramuscular    Site: Deltoid- Left    Lot: [id number]    NDC: 54714-386-50                Pt tolerated well. VIS was given.

## 2023-01-04 ENCOUNTER — HOSPITAL ENCOUNTER (OUTPATIENT)
Age: 58
Discharge: HOME | End: 2023-01-04
Payer: COMMERCIAL

## 2023-01-04 LAB — TSH SERPL DL<=0.05 MIU/L-ACNC: 1.3 UIU/ML (ref 0.4–4.2)

## 2023-01-04 PROCEDURE — 84443 ASSAY THYROID STIM HORMONE: CPT

## 2023-01-04 PROCEDURE — 86800 THYROGLOBULIN ANTIBODY: CPT

## 2023-01-04 PROCEDURE — 36415 COLL VENOUS BLD VENIPUNCTURE: CPT

## 2023-01-06 LAB — THYROGLOBULIN: NORMAL

## 2023-03-23 DIAGNOSIS — I10 ESSENTIAL HYPERTENSION: ICD-10-CM

## 2023-03-23 RX ORDER — LOSARTAN POTASSIUM 50 MG/1
TABLET ORAL
Qty: 90 TABLET | Refills: 3 | Status: SHIPPED | OUTPATIENT
Start: 2023-03-23

## 2023-03-23 NOTE — TELEPHONE ENCOUNTER
Last Appt.  11/9/2022   Next Appt. 5/9/2023   RX Pended OCCUPATIONAL THERAPY EVALUATION - INPATIENT     Room Number: 406/406-A  Evaluation Date: 11/20/2018  Type of Evaluation: Initial  Presenting Problem: (laminectomy L2-5)    Physician Order: IP Consult to Occupational Therapy  Reason for Therapy: ADL/IADL Patient will be discharged from Occupational Therapy services. Please re-order if a new functional limitation presents during this admission.     OCCUPATIONAL THERAPY MEDICAL/SOCIAL HISTORY     Problem List  Active Problems:    Lumbar stenosis      Past Med COGNITION  Pt is able to follow commands. Shortly after providing info, pt unable to recalll spine precautions, admittin g to \"memory problems\". LAfter review, pt recalls 3/3. Pt demo questionable problem solving and carry over of learned techniques.

## 2023-03-24 ENCOUNTER — OFFICE VISIT (OUTPATIENT)
Dept: CARDIOLOGY CLINIC | Age: 58
End: 2023-03-24
Payer: COMMERCIAL

## 2023-03-24 VITALS
WEIGHT: 210 LBS | SYSTOLIC BLOOD PRESSURE: 118 MMHG | HEIGHT: 65 IN | HEART RATE: 61 BPM | BODY MASS INDEX: 34.99 KG/M2 | DIASTOLIC BLOOD PRESSURE: 72 MMHG

## 2023-03-24 DIAGNOSIS — R00.2 INTERMITTENT PALPITATIONS: ICD-10-CM

## 2023-03-24 DIAGNOSIS — I10 ESSENTIAL HYPERTENSION: Primary | ICD-10-CM

## 2023-03-24 DIAGNOSIS — R06.02 SOB (SHORTNESS OF BREATH) ON EXERTION: ICD-10-CM

## 2023-03-24 DIAGNOSIS — R94.39 ABNORMAL STRESS ECG: ICD-10-CM

## 2023-03-24 PROCEDURE — 99214 OFFICE O/P EST MOD 30 MIN: CPT | Performed by: INTERNAL MEDICINE

## 2023-03-24 PROCEDURE — 3078F DIAST BP <80 MM HG: CPT | Performed by: INTERNAL MEDICINE

## 2023-03-24 PROCEDURE — 3074F SYST BP LT 130 MM HG: CPT | Performed by: INTERNAL MEDICINE

## 2023-03-24 NOTE — PROGRESS NOTES
Chief Complaint   Patient presents with    1 Year Follow Up   originally  patient  Here for abnormal EKG and cp        Pt here today for a 1 year follow up    EKG done 6/20/2022    Occasional palpitations- stable and better    Sob on exertion -mainly on stairs -chronic unchanged     Denied Chest pain,  Dizziness, edema   No N/v/d      FHX  Dad's first heart attck was when 62years old. One brother was 62 when he had heart attack and passed away and   her other brother was 54 when he had his heart attack and he is still living.   Con Osmin had CABG at age 56        nevere smoked      Patient Active Problem List   Diagnosis    Anxiety    Fibroid, uterine    Intermittent palpitations    Family history of premature CAD-father and two brother had mi at age 62    Boderline Abnormal stress ECG-st depression of 0.6    Essential hypertension    Seasonal allergies    Osteopenia after menopause    Class 1 obesity due to excess calories with serious comorbidity and body mass index (BMI) of 33.0 to 33.9 in adult    Primary thyroid cancer (HCC)    Papillary thyroid carcinoma (HCC)    Non-toxic multinodular goiter    SOB (shortness of breath) on exertion    Chronic right shoulder pain    Osteoarthritis of pelvis    Vitamin D deficiency    Hearing loss of left ear    Acquired spondylolisthesis    Degeneration of lumbar intervertebral disc    Lumbosacral spondylosis without myelopathy    Neurogenic claudication (HCC)    Lumbar stenosis with neurogenic claudication       Past Surgical History:   Procedure Laterality Date    HYSTERECTOMY, TOTAL ABDOMINAL (CERVIX REMOVED)  3/31/2016    LUMBAR FUSION N/A 7/1/2022    L4-5 DECOMPRESSION L4-5 PSF performed by Pardeep Gonzáles MD at [facility]  08/04/2021    mass removed from chest non cancerous- [facility] Dr. Jeffery Bueno    THYROID SURGERY         Allergies   Allergen Reactions    Morphine Hives, Swelling and Other (See Comments)     \"not sure of reaction\"    Seasonal Itching

## 2023-04-17 ENCOUNTER — HOSPITAL ENCOUNTER (OUTPATIENT)
Dept: WOMENS IMAGING | Age: 58
Discharge: HOME | End: 2023-04-17
Payer: COMMERCIAL

## 2023-04-17 DIAGNOSIS — Z12.31 VISIT FOR SCREENING MAMMOGRAM: ICD-10-CM

## 2023-04-17 PROCEDURE — 77063 BREAST TOMOSYNTHESIS BI: CPT

## 2023-05-01 DIAGNOSIS — F41.9 ANXIETY: ICD-10-CM

## 2023-05-01 RX ORDER — BUSPIRONE HYDROCHLORIDE 10 MG/1
TABLET ORAL
Qty: 180 TABLET | Refills: 3 | Status: SHIPPED | OUTPATIENT
Start: 2023-05-01

## 2023-05-09 ENCOUNTER — OFFICE VISIT (OUTPATIENT)
Dept: FAMILY MEDICINE CLINIC | Age: 58
End: 2023-05-09
Payer: COMMERCIAL

## 2023-05-09 VITALS
OXYGEN SATURATION: 97 % | TEMPERATURE: 97.2 F | SYSTOLIC BLOOD PRESSURE: 124 MMHG | DIASTOLIC BLOOD PRESSURE: 78 MMHG | WEIGHT: 212 LBS | BODY MASS INDEX: 35.28 KG/M2 | HEART RATE: 70 BPM | RESPIRATION RATE: 18 BRPM

## 2023-05-09 DIAGNOSIS — I10 ESSENTIAL HYPERTENSION: ICD-10-CM

## 2023-05-09 DIAGNOSIS — E55.9 VITAMIN D DEFICIENCY: ICD-10-CM

## 2023-05-09 DIAGNOSIS — F41.9 ANXIETY: ICD-10-CM

## 2023-05-09 DIAGNOSIS — C73 PAPILLARY THYROID CARCINOMA (HCC): ICD-10-CM

## 2023-05-09 DIAGNOSIS — D17.0 LIPOMA OF HEAD: Primary | ICD-10-CM

## 2023-05-09 DIAGNOSIS — J30.2 SEASONAL ALLERGIES: ICD-10-CM

## 2023-05-09 PROCEDURE — 3074F SYST BP LT 130 MM HG: CPT | Performed by: NURSE PRACTITIONER

## 2023-05-09 PROCEDURE — 3078F DIAST BP <80 MM HG: CPT | Performed by: NURSE PRACTITIONER

## 2023-05-09 PROCEDURE — 99214 OFFICE O/P EST MOD 30 MIN: CPT | Performed by: NURSE PRACTITIONER

## 2023-05-09 RX ORDER — BUSPIRONE HYDROCHLORIDE 10 MG/1
10 TABLET ORAL 2 TIMES DAILY
Qty: 180 TABLET | Refills: 3 | Status: SHIPPED | OUTPATIENT
Start: 2023-05-09

## 2023-05-09 SDOH — ECONOMIC STABILITY: FOOD INSECURITY: WITHIN THE PAST 12 MONTHS, THE FOOD YOU BOUGHT JUST DIDN'T LAST AND YOU DIDN'T HAVE MONEY TO GET MORE.: NEVER TRUE

## 2023-05-09 SDOH — ECONOMIC STABILITY: HOUSING INSECURITY
IN THE LAST 12 MONTHS, WAS THERE A TIME WHEN YOU DID NOT HAVE A STEADY PLACE TO SLEEP OR SLEPT IN A SHELTER (INCLUDING NOW)?: NO

## 2023-05-09 SDOH — ECONOMIC STABILITY: FOOD INSECURITY: WITHIN THE PAST 12 MONTHS, YOU WORRIED THAT YOUR FOOD WOULD RUN OUT BEFORE YOU GOT MONEY TO BUY MORE.: NEVER TRUE

## 2023-05-09 SDOH — ECONOMIC STABILITY: INCOME INSECURITY: HOW HARD IS IT FOR YOU TO PAY FOR THE VERY BASICS LIKE FOOD, HOUSING, MEDICAL CARE, AND HEATING?: NOT HARD AT ALL

## 2023-05-09 ASSESSMENT — ENCOUNTER SYMPTOMS
CONSTIPATION: 0
CHEST TIGHTNESS: 0
SHORTNESS OF BREATH: 0
RHINORRHEA: 0
COUGH: 0
ABDOMINAL PAIN: 0
EYE DISCHARGE: 0
DIARRHEA: 0
VOMITING: 0

## 2023-05-09 ASSESSMENT — PATIENT HEALTH QUESTIONNAIRE - PHQ9
1. LITTLE INTEREST OR PLEASURE IN DOING THINGS: 0
SUM OF ALL RESPONSES TO PHQ QUESTIONS 1-9: 0
2. FEELING DOWN, DEPRESSED OR HOPELESS: 0
SUM OF ALL RESPONSES TO PHQ QUESTIONS 1-9: 0
SUM OF ALL RESPONSES TO PHQ9 QUESTIONS 1 & 2: 0
SUM OF ALL RESPONSES TO PHQ QUESTIONS 1-9: 0
SUM OF ALL RESPONSES TO PHQ QUESTIONS 1-9: 0
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
## 2023-09-05 RX ORDER — PANTOPRAZOLE SODIUM 40 MG/1
TABLET, DELAYED RELEASE ORAL
Qty: 90 TABLET | Refills: 3 | Status: SHIPPED | OUTPATIENT
Start: 2023-09-05

## 2023-09-11 ENCOUNTER — TELEPHONE (OUTPATIENT)
Dept: FAMILY MEDICINE CLINIC | Age: 58
End: 2023-09-11

## 2023-09-11 DIAGNOSIS — L20.84 INTRINSIC ECZEMA: ICD-10-CM

## 2023-09-11 NOTE — TELEPHONE ENCOUNTER
Pt called requesting [facility] ecz[facility]- she was unsure of what the name of this was

## 2023-10-20 ENCOUNTER — HOSPITAL ENCOUNTER (OUTPATIENT)
Age: 58
Discharge: HOME | End: 2023-10-20
Payer: COMMERCIAL

## 2023-10-20 LAB — TSH SERPL DL<=0.005 MIU/L-ACNC: 1.4 UIU/ML (ref 0.4–4.2)

## 2023-10-20 PROCEDURE — 84443 ASSAY THYROID STIM HORMONE: CPT

## 2023-10-20 PROCEDURE — 36415 COLL VENOUS BLD VENIPUNCTURE: CPT

## 2023-10-20 PROCEDURE — 86800 THYROGLOBULIN ANTIBODY: CPT

## 2023-10-21 LAB — THYROGLOBULIN: NORMAL

## 2023-11-04 ENCOUNTER — HOSPITAL ENCOUNTER (OUTPATIENT)
Age: 58
Discharge: HOME | End: 2023-11-04
Payer: COMMERCIAL

## 2023-11-04 DIAGNOSIS — C73 PAPILLARY THYROID CARCINOMA (HCC): ICD-10-CM

## 2023-11-04 DIAGNOSIS — I10 ESSENTIAL HYPERTENSION: ICD-10-CM

## 2023-11-04 DIAGNOSIS — E55.9 VITAMIN D DEFICIENCY: ICD-10-CM

## 2023-11-04 LAB
25(OH)D3 SERPL-MCNC: 52 NG/ML (ref 30–100)
ALBUMIN SERPL BCG-MCNC: 4.2 G/DL (ref 3.5–5.1)
ALP SERPL-CCNC: 111 U/L (ref 38–126)
ALT SERPL W/O P-5'-P-CCNC: 16 U/L (ref 11–66)
ANION GAP SERPL CALC-SCNC: 8 MEQ/L (ref 8–16)
AST SERPL-CCNC: 16 U/L (ref 5–40)
BASOPHILS ABSOLUTE: 0 THOU/MM3 (ref 0–0.1)
BASOPHILS NFR BLD AUTO: 0.8 %
BILIRUB SERPL-MCNC: 0.6 MG/DL (ref 0.3–1.2)
BUN SERPL-MCNC: 16 MG/DL (ref 7–22)
CALCIUM SERPL-MCNC: 9.4 MG/DL (ref 8.5–10.5)
CHLORIDE SERPL-SCNC: 105 MEQ/L (ref 98–111)
CHOLEST SERPL-MCNC: 190 MG/DL (ref 100–199)
CO2 SERPL-SCNC: 28 MEQ/L (ref 23–33)
CREAT SERPL-MCNC: 0.7 MG/DL (ref 0.4–1.2)
DEPRECATED MEAN GLUCOSE BLD GHB EST-ACNC: 102 MG/DL (ref 70–126)
DEPRECATED RDW RBC AUTO: 46.1 FL (ref 35–45)
EOSINOPHIL NFR BLD AUTO: 2 %
EOSINOPHILS ABSOLUTE: 0.1 THOU/MM3 (ref 0–0.4)
ERYTHROCYTE [DISTWIDTH] IN BLOOD BY AUTOMATED COUNT: 13.3 % (ref 11.5–14.5)
GFR SERPL CREATININE-BSD FRML MDRD: > 60 ML/MIN/1.73M2
GLUCOSE FASTING: 86 MG/DL (ref 70–108)
HBA1C MFR BLD HPLC: 5.4 % (ref 4.4–6.4)
HCT VFR BLD AUTO: 43.9 % (ref 37–47)
HDLC SERPL-MCNC: 75 MG/DL
HGB BLD-MCNC: 13.9 GM/DL (ref 12–16)
IMM GRANULOCYTES # BLD AUTO: 0.03 THOU/MM3 (ref 0–0.07)
IMM GRANULOCYTES NFR BLD AUTO: 0.6 %
LDLC SERPL CALC-MCNC: 107 MG/DL
LYMPHOCYTES ABSOLUTE: 1.3 THOU/MM3 (ref 1–4.8)
LYMPHOCYTES NFR BLD AUTO: 25.3 %
MAGNESIUM SERPL-MCNC: 2 MG/DL (ref 1.6–2.4)
MCH RBC QN AUTO: 30 PG (ref 26–33)
MCHC RBC AUTO-ENTMCNC: 31.7 GM/DL (ref 32.2–35.5)
MCV RBC AUTO: 94.8 FL (ref 81–99)
MONOCYTES ABSOLUTE: 0.6 THOU/MM3 (ref 0.4–1.3)
MONOCYTES NFR BLD AUTO: 12.5 %
NEUTROPHILS NFR BLD AUTO: 58.8 %
NRBC BLD AUTO-RTO: 0 /100 WBC
PLATELET # BLD AUTO: 201 THOU/MM3 (ref 130–400)
PMV BLD AUTO: 11 FL (ref 9.4–12.4)
POTASSIUM SERPL-SCNC: 4.6 MEQ/L (ref 3.5–5.2)
PROT SERPL-MCNC: 7.2 G/DL (ref 6.1–8)
RBC # BLD AUTO: 4.63 MILL/MM3 (ref 4.2–5.4)
SEGMENTED NEUTROPHILS ABSOLUTE COUNT: 2.9 THOU/MM3 (ref 1.8–7.7)
SODIUM SERPL-SCNC: 141 MEQ/L (ref 135–145)
TRIGL SERPL-MCNC: 42 MG/DL (ref 0–199)
TSH SERPL DL<=0.005 MIU/L-ACNC: 1.17 UIU/ML (ref 0.4–4.2)
WBC # BLD AUTO: 5 THOU/MM3 (ref 4.8–10.8)

## 2023-11-04 PROCEDURE — 36415 COLL VENOUS BLD VENIPUNCTURE: CPT

## 2023-11-04 PROCEDURE — 83735 ASSAY OF MAGNESIUM: CPT

## 2023-11-04 PROCEDURE — 82306 VITAMIN D 25 HYDROXY: CPT

## 2023-11-04 PROCEDURE — 80053 COMPREHEN METABOLIC PANEL: CPT

## 2023-11-04 PROCEDURE — 80061 LIPID PANEL: CPT

## 2023-11-04 PROCEDURE — 85025 COMPLETE CBC W/AUTO DIFF WBC: CPT

## 2023-11-04 PROCEDURE — 84443 ASSAY THYROID STIM HORMONE: CPT

## 2023-11-04 PROCEDURE — 83036 HEMOGLOBIN GLYCOSYLATED A1C: CPT

## 2023-12-12 ENCOUNTER — OFFICE VISIT (OUTPATIENT)
Dept: FAMILY MEDICINE CLINIC | Age: 58
End: 2023-12-12
Payer: COMMERCIAL

## 2023-12-12 VITALS
RESPIRATION RATE: 16 BRPM | BODY MASS INDEX: 35.94 KG/M2 | SYSTOLIC BLOOD PRESSURE: 118 MMHG | WEIGHT: 216 LBS | OXYGEN SATURATION: 97 % | TEMPERATURE: 97.4 F | HEART RATE: 77 BPM | DIASTOLIC BLOOD PRESSURE: 76 MMHG

## 2023-12-12 DIAGNOSIS — L20.84 INTRINSIC ECZEMA: Primary | ICD-10-CM

## 2023-12-12 DIAGNOSIS — C73 PAPILLARY THYROID CARCINOMA (HCC): ICD-10-CM

## 2023-12-12 DIAGNOSIS — J30.2 SEASONAL ALLERGIES: ICD-10-CM

## 2023-12-12 DIAGNOSIS — M48.061 SPINAL STENOSIS OF LUMBAR REGION, UNSPECIFIED WHETHER NEUROGENIC CLAUDICATION PRESENT: ICD-10-CM

## 2023-12-12 DIAGNOSIS — I10 ESSENTIAL HYPERTENSION: ICD-10-CM

## 2023-12-12 DIAGNOSIS — E55.9 VITAMIN D DEFICIENCY: ICD-10-CM

## 2023-12-12 DIAGNOSIS — Z23 NEED FOR IMMUNIZATION AGAINST INFLUENZA: ICD-10-CM

## 2023-12-12 PROCEDURE — 99214 OFFICE O/P EST MOD 30 MIN: CPT | Performed by: NURSE PRACTITIONER

## 2023-12-12 PROCEDURE — 3078F DIAST BP <80 MM HG: CPT | Performed by: NURSE PRACTITIONER

## 2023-12-12 PROCEDURE — 90674 CCIIV4 VAC NO PRSV 0.5 ML IM: CPT | Performed by: NURSE PRACTITIONER

## 2023-12-12 PROCEDURE — 3074F SYST BP LT 130 MM HG: CPT | Performed by: NURSE PRACTITIONER

## 2023-12-12 PROCEDURE — 90471 IMMUNIZATION ADMIN: CPT | Performed by: NURSE PRACTITIONER

## 2023-12-12 RX ORDER — MELOXICAM 15 MG/1
15 TABLET ORAL DAILY
COMMUNITY
Start: 2023-11-16

## 2023-12-12 ASSESSMENT — PATIENT HEALTH QUESTIONNAIRE - PHQ9
1. LITTLE INTEREST OR PLEASURE IN DOING THINGS: 0
SUM OF ALL RESPONSES TO PHQ QUESTIONS 1-9: 0
2. FEELING DOWN, DEPRESSED OR HOPELESS: 0
SUM OF ALL RESPONSES TO PHQ QUESTIONS 1-9: 0
SUM OF ALL RESPONSES TO PHQ QUESTIONS 1-9: 0
SUM OF ALL RESPONSES TO PHQ9 QUESTIONS 1 & 2: 0
SUM OF ALL RESPONSES TO PHQ QUESTIONS 1-9: 0

## 2023-12-12 ASSESSMENT — ENCOUNTER SYMPTOMS
SHORTNESS OF BREATH: 0
RHINORRHEA: 0
COUGH: 0
CONSTIPATION: 0
CHEST TIGHTNESS: 0
VOMITING: 0
ABDOMINAL PAIN: 0
DIARRHEA: 0
EYE DISCHARGE: 0

## 2023-12-12 NOTE — PROGRESS NOTES
Nicolas Francisco [address], [place] [address] [address]. [facility] [address]  Dept: [phone]  Dept Fax: [phone]    Visit type: Established patient    Reason for Visit: Results (Labs 11/04/23), Health Maintenance (Vaccines ), and Mass (On chest - first noticed on Sunday /No pain / itch )         Assessment and Plan       1. Intrinsic eczema  2. Vitamin D deficiency  3. Essential hypertension  4. Seasonal allergies  5. Papillary thyroid carcinoma (HCC)  -     US HEAD NECK SOFT TISSUE THYROID; Future  6. Spinal stenosis of lumbar region, unspecified whether neurogenic claudication present  7. Need for immunization against influenza  -     Influenza, FLUCELVAX, (age 10 mo+), IM, Preservative Free, 0.5 mL    Flu vaccine today  Last labs reviewed  No changes to medication regimen  Can get shingles, and covid booster at pharmacy- is unsure if she wants to get yet  Continue fu with endo and cardiology  Will order US to assess mass below her surgical scar on her neck   Return in about 6 months (around 6/12/2024) for physical, chronic condition review . Subjective       Mass on chest  Onset Sunday  Has tried nothing  Feels like fluid under skin per patient   Additional symptoms?  No pain or itching     Would also like to review labs and chronic condition     NOLBERTO  Onset over a year ago  Is taking buspar   Does not feeling anxious or overwhelmed   Is sleeping well     Hx of thyroid cancer  Onset over a year ago  Is taking thyroid supplement   States that she has had two thyroid surgeries and just noticed yesterday that she had a mass or what felt like fluid below her incision site   No skin hair or nail changes  No heat or cold intolerance  Follows with specialist -Had an apt in October and goes yearly     GERD  Onset over a year ago  Is taking protonix     HTN  Onset over a year ago  No CP  Rare palpitations - improved with beta blocker   No SOB with exertion   Taking arb and beta blocker

## 2023-12-12 NOTE — PROGRESS NOTES
Vaccine Information Sheet, \"Influenza - Inactivated\"  given to Jone Mead, or parent/legal guardian of  Jone Mead and verbalized understanding. Patient responses:    Have you ever had a reaction to a flu vaccine? No  Do you have an allergy to eggs, neomycin or polymixin? No  Do you have an allergy to Thimerosal, contact lens solution, or Merthiolate? No  Have you ever had Guillian [place] Syndrome? No  Do you have any current illness? No  Do you have a temperature above 100 degrees? No  Are you pregnant? No  If pregnant, permission obtained from physician? N/a  Do you have an active neurological disorder? No      Flu vaccine given per order. Please see immunization tab. After obtaining consent, and per orders of Lindsey Yan CNP, injection of influenza 0.5mL given in Right deltoid by Yossi Oconnor MA. Patient instructed to remain in clinic for 20 minutes afterwards, and to report any adverse reaction to me immediately. Immunizations Administered       Name Date Dose Route    Influenza, FLUCELVAX, (age 10 mo+), MDCK, PF, 0.5mL 12/12/2023 0.5 mL Intramuscular    Site: Deltoid- Right    Lot: [id number]    NDC: 52023-928-89                Pt tolerated well. VIS was given.

## 2024-03-18 DIAGNOSIS — I10 ESSENTIAL HYPERTENSION: ICD-10-CM

## 2024-03-18 RX ORDER — LOSARTAN POTASSIUM 50 MG/1
TABLET ORAL
Qty: 90 TABLET | Refills: 3 | Status: SHIPPED | OUTPATIENT
Start: 2024-03-18

## 2024-03-22 ENCOUNTER — OFFICE VISIT (OUTPATIENT)
Dept: CARDIOLOGY CLINIC | Age: 59
End: 2024-03-22
Payer: COMMERCIAL

## 2024-03-22 VITALS
SYSTOLIC BLOOD PRESSURE: 117 MMHG | HEART RATE: 65 BPM | WEIGHT: 203.8 LBS | HEIGHT: 65 IN | BODY MASS INDEX: 33.95 KG/M2 | DIASTOLIC BLOOD PRESSURE: 73 MMHG

## 2024-03-22 DIAGNOSIS — R00.2 INTERMITTENT PALPITATIONS: ICD-10-CM

## 2024-03-22 DIAGNOSIS — R94.39 ABNORMAL STRESS ECG: ICD-10-CM

## 2024-03-22 DIAGNOSIS — I10 ESSENTIAL HYPERTENSION: Primary | ICD-10-CM

## 2024-03-22 DIAGNOSIS — R06.02 SOB (SHORTNESS OF BREATH) ON EXERTION: ICD-10-CM

## 2024-03-22 PROCEDURE — 3074F SYST BP LT 130 MM HG: CPT | Performed by: INTERNAL MEDICINE

## 2024-03-22 PROCEDURE — 93000 ELECTROCARDIOGRAM COMPLETE: CPT | Performed by: INTERNAL MEDICINE

## 2024-03-22 PROCEDURE — 3078F DIAST BP <80 MM HG: CPT | Performed by: INTERNAL MEDICINE

## 2024-03-22 PROCEDURE — 99214 OFFICE O/P EST MOD 30 MIN: CPT | Performed by: INTERNAL MEDICINE

## 2024-03-22 NOTE — PROGRESS NOTES
symptoms  Dermatological ROS: negative      Blood pressure 117/73, pulse 65, height 1.651 m (5' 5\"), weight 92.4 kg (203 lb 12.8 oz), last menstrual period 02/05/2016.        Physical Examination:    General appearance - alert, well appearing, and in no distress  Mental status - alert, oriented to person, place, and time  Neck - supple, no significant adenopathy, no JVD, or carotid bruits  Chest - clear to auscultation, no wheezes, rales or rhonchi, symmetric air entry  Heart - normal rate, regular rhythm, normal S1, S2, no murmurs, rubs, clicks or gallops  Abdomen - soft, nontender, nondistended, no masses or organomegaly  Neurological - alert, oriented, normal speech, no focal findings or movement disorder noted  Musculoskeletal - no joint tenderness, deformity or swelling  Extremities - peripheral pulses normal, no pedal edema, no clubbing or cyanosis  Skin - normal coloration and turgor, no rashes, no suspicious skin lesions noted    Lab  No results for input(s): \"CKTOTAL\", \"CKMB\", \"CKMBINDEX\", \"TROPONINI\" in the last 72 hours.  CBC:   Lab Results   Component Value Date/Time    WBC 5.0 11/04/2023 09:32 AM    RBC 4.63 11/04/2023 09:32 AM    HGB 13.9 11/04/2023 09:32 AM    HCT 43.9 11/04/2023 09:32 AM    MCV 94.8 11/04/2023 09:32 AM    MCH 30.0 11/04/2023 09:32 AM    MCHC 31.7 11/04/2023 09:32 AM    RDW 13.1 02/03/2018 07:33 AM     11/04/2023 09:32 AM    MPV 11.0 11/04/2023 09:32 AM     BMP:    Lab Results   Component Value Date/Time     11/04/2023 09:32 AM    K 4.6 11/04/2023 09:32 AM    K 4.3 07/23/2019 04:37 AM     11/04/2023 09:32 AM    CO2 28 11/04/2023 09:32 AM    BUN 16 11/04/2023 09:32 AM    LABALBU 4.2 11/04/2023 09:32 AM    CREATININE 0.7 11/04/2023 09:32 AM    CALCIUM 9.4 11/04/2023 09:32 AM    LABGLOM >60 11/04/2023 09:32 AM    GLUCOSE 106 07/03/2022 04:51 AM     Hepatic Function Panel:    Lab Results   Component Value Date/Time    ALKPHOS 111 11/04/2023 09:32 AM    ALT 16 11/04/2023

## 2024-04-23 DIAGNOSIS — F41.9 ANXIETY: ICD-10-CM

## 2024-04-23 RX ORDER — BUSPIRONE HYDROCHLORIDE 10 MG/1
10 TABLET ORAL 2 TIMES DAILY
Qty: 180 TABLET | Refills: 3 | Status: SHIPPED | OUTPATIENT
Start: 2024-04-23

## 2024-05-08 ENCOUNTER — HOSPITAL ENCOUNTER (OUTPATIENT)
Dept: WOMENS IMAGING | Age: 59
Discharge: HOME | End: 2024-05-08
Payer: COMMERCIAL

## 2024-05-08 DIAGNOSIS — Z12.31 VISIT FOR SCREENING MAMMOGRAM: ICD-10-CM

## 2024-05-08 PROCEDURE — 77063 BREAST TOMOSYNTHESIS BI: CPT

## 2024-06-13 ENCOUNTER — OFFICE VISIT (OUTPATIENT)
Dept: FAMILY MEDICINE CLINIC | Age: 59
End: 2024-06-13

## 2024-06-13 VITALS
BODY MASS INDEX: 34.05 KG/M2 | RESPIRATION RATE: 16 BRPM | TEMPERATURE: 97.2 F | SYSTOLIC BLOOD PRESSURE: 110 MMHG | DIASTOLIC BLOOD PRESSURE: 70 MMHG | HEART RATE: 75 BPM | WEIGHT: 204.6 LBS | OXYGEN SATURATION: 95 %

## 2024-06-13 DIAGNOSIS — Z00.00 ANNUAL PHYSICAL EXAM: Primary | ICD-10-CM

## 2024-06-13 DIAGNOSIS — F40.243 FEAR OF FLYING: ICD-10-CM

## 2024-06-13 DIAGNOSIS — G89.29 CHRONIC LEFT-SIDED LOW BACK PAIN WITHOUT SCIATICA: ICD-10-CM

## 2024-06-13 DIAGNOSIS — E55.9 VITAMIN D DEFICIENCY: ICD-10-CM

## 2024-06-13 DIAGNOSIS — J30.2 SEASONAL ALLERGIES: ICD-10-CM

## 2024-06-13 DIAGNOSIS — F41.9 ANXIETY: ICD-10-CM

## 2024-06-13 DIAGNOSIS — I10 ESSENTIAL HYPERTENSION: ICD-10-CM

## 2024-06-13 DIAGNOSIS — M54.50 CHRONIC LEFT-SIDED LOW BACK PAIN WITHOUT SCIATICA: ICD-10-CM

## 2024-06-13 RX ORDER — BACLOFEN 10 MG/1
10 TABLET ORAL DAILY PRN
Qty: 90 TABLET | Refills: 2 | Status: SHIPPED | OUTPATIENT
Start: 2024-06-13

## 2024-06-13 RX ORDER — ALPRAZOLAM 0.5 MG/1
0.5 TABLET ORAL 2 TIMES DAILY PRN
Qty: 4 TABLET | Refills: 0 | Status: SHIPPED | OUTPATIENT
Start: 2024-06-13 | End: 2024-06-15

## 2024-06-13 SDOH — ECONOMIC STABILITY: FOOD INSECURITY: WITHIN THE PAST 12 MONTHS, YOU WORRIED THAT YOUR FOOD WOULD RUN OUT BEFORE YOU GOT MONEY TO BUY MORE.: NEVER TRUE

## 2024-06-13 SDOH — ECONOMIC STABILITY: FOOD INSECURITY: WITHIN THE PAST 12 MONTHS, THE FOOD YOU BOUGHT JUST DIDN'T LAST AND YOU DIDN'T HAVE MONEY TO GET MORE.: NEVER TRUE

## 2024-06-13 SDOH — ECONOMIC STABILITY: INCOME INSECURITY: HOW HARD IS IT FOR YOU TO PAY FOR THE VERY BASICS LIKE FOOD, HOUSING, MEDICAL CARE, AND HEATING?: NOT HARD AT ALL

## 2024-06-13 ASSESSMENT — PATIENT HEALTH QUESTIONNAIRE - PHQ9
2. FEELING DOWN, DEPRESSED OR HOPELESS: NOT AT ALL
SUM OF ALL RESPONSES TO PHQ QUESTIONS 1-9: 0
SUM OF ALL RESPONSES TO PHQ QUESTIONS 1-9: 0
SUM OF ALL RESPONSES TO PHQ9 QUESTIONS 1 & 2: 0
SUM OF ALL RESPONSES TO PHQ QUESTIONS 1-9: 0
1. LITTLE INTEREST OR PLEASURE IN DOING THINGS: NOT AT ALL
SUM OF ALL RESPONSES TO PHQ QUESTIONS 1-9: 0

## 2024-06-13 ASSESSMENT — ENCOUNTER SYMPTOMS
DIARRHEA: 0
ABDOMINAL PAIN: 0
SHORTNESS OF BREATH: 0
EYE DISCHARGE: 0
COUGH: 0
CONSTIPATION: 0
CHEST TIGHTNESS: 0
VOMITING: 0
RHINORRHEA: 0

## 2024-08-26 RX ORDER — PANTOPRAZOLE SODIUM 40 MG/1
TABLET, DELAYED RELEASE ORAL
Qty: 90 TABLET | Refills: 3 | Status: SHIPPED | OUTPATIENT
Start: 2024-08-26

## 2024-11-22 ENCOUNTER — HOSPITAL ENCOUNTER (OUTPATIENT)
Age: 59
Discharge: HOME | End: 2024-11-22
Payer: COMMERCIAL

## 2024-11-22 LAB — TSH SERPL DL<=0.005 MIU/L-ACNC: 2.41 UIU/ML (ref 0.4–4.2)

## 2024-11-22 PROCEDURE — 84443 ASSAY THYROID STIM HORMONE: CPT

## 2024-11-22 PROCEDURE — 36415 COLL VENOUS BLD VENIPUNCTURE: CPT

## 2024-11-22 PROCEDURE — 86800 THYROGLOBULIN ANTIBODY: CPT

## 2024-11-23 LAB — THYROGLOBULIN: NORMAL

## 2024-12-07 ENCOUNTER — HOSPITAL ENCOUNTER (OUTPATIENT)
Age: 59
Discharge: HOME | End: 2024-12-07
Payer: COMMERCIAL

## 2024-12-07 DIAGNOSIS — E55.9 VITAMIN D DEFICIENCY: ICD-10-CM

## 2024-12-07 DIAGNOSIS — I10 ESSENTIAL HYPERTENSION: ICD-10-CM

## 2024-12-07 LAB
25(OH)D3 SERPL-MCNC: 42 NG/ML (ref 30–100)
ALBUMIN SERPL BCG-MCNC: 4.4 G/DL (ref 3.5–5.1)
ALP SERPL-CCNC: 150 U/L (ref 38–126)
ALT SERPL W/O P-5'-P-CCNC: 14 U/L (ref 11–66)
ANION GAP SERPL CALC-SCNC: 9 MEQ/L (ref 8–16)
AST SERPL-CCNC: 19 U/L (ref 5–40)
BASOPHILS ABSOLUTE: 0.1 THOU/MM3 (ref 0–0.1)
BASOPHILS NFR BLD AUTO: 1 %
BILIRUB SERPL-MCNC: 0.5 MG/DL (ref 0.3–1.2)
BUN SERPL-MCNC: 19 MG/DL (ref 7–22)
CALCIUM SERPL-MCNC: 9.6 MG/DL (ref 8.5–10.5)
CHLORIDE SERPL-SCNC: 102 MEQ/L (ref 98–111)
CHOLEST SERPL-MCNC: 224 MG/DL (ref 100–199)
CO2 SERPL-SCNC: 28 MEQ/L (ref 23–33)
CREAT SERPL-MCNC: 0.7 MG/DL (ref 0.4–1.2)
DEPRECATED MEAN GLUCOSE BLD GHB EST-ACNC: 102 MG/DL (ref 70–126)
DEPRECATED RDW RBC AUTO: 42.8 FL (ref 35–45)
EOSINOPHIL NFR BLD AUTO: 1.7 %
EOSINOPHILS ABSOLUTE: 0.1 THOU/MM3 (ref 0–0.4)
ERYTHROCYTE [DISTWIDTH] IN BLOOD BY AUTOMATED COUNT: 12.9 % (ref 11.5–14.5)
GFR SERPL CREATININE-BSD FRML MDRD: > 90 ML/MIN/1.73M2
GLUCOSE FASTING: 87 MG/DL (ref 70–108)
HBA1C MFR BLD HPLC: 5.4 % (ref 4.4–6.4)
HCT VFR BLD AUTO: 45.8 % (ref 37–47)
HDLC SERPL-MCNC: 92 MG/DL
HGB BLD-MCNC: 15 GM/DL (ref 12–16)
IMM GRANULOCYTES # BLD AUTO: 0.05 THOU/MM3 (ref 0–0.07)
IMM GRANULOCYTES NFR BLD AUTO: 0.9 %
LDLC SERPL CALC-MCNC: 122 MG/DL
LYMPHOCYTES ABSOLUTE: 1.6 THOU/MM3 (ref 1–4.8)
LYMPHOCYTES NFR BLD AUTO: 26.5 %
MAGNESIUM SERPL-MCNC: 1.9 MG/DL (ref 1.6–2.4)
MCH RBC QN AUTO: 29.9 PG (ref 26–33)
MCHC RBC AUTO-ENTMCNC: 32.8 GM/DL (ref 32.2–35.5)
MCV RBC AUTO: 91.4 FL (ref 81–99)
MONOCYTES ABSOLUTE: 0.7 THOU/MM3 (ref 0.4–1.3)
MONOCYTES NFR BLD AUTO: 11.4 %
NEUTROPHILS ABSOLUTE: 3.5 THOU/MM3 (ref 1.8–7.7)
NEUTROPHILS NFR BLD AUTO: 58.5 %
NRBC BLD AUTO-RTO: 0 /100 WBC
PLATELET # BLD AUTO: 236 THOU/MM3 (ref 130–400)
PMV BLD AUTO: 10.6 FL (ref 9.4–12.4)
POTASSIUM SERPL-SCNC: 4.4 MEQ/L (ref 3.5–5.2)
PROT SERPL-MCNC: 7.5 G/DL (ref 6.1–8)
RBC # BLD AUTO: 5.01 MILL/MM3 (ref 4.2–5.4)
SODIUM SERPL-SCNC: 139 MEQ/L (ref 135–145)
TRIGL SERPL-MCNC: 48 MG/DL (ref 0–199)
TSH SERPL DL<=0.005 MIU/L-ACNC: 2.03 UIU/ML (ref 0.4–4.2)
WBC # BLD AUTO: 5.9 THOU/MM3 (ref 4.8–10.8)

## 2024-12-07 PROCEDURE — 80061 LIPID PANEL: CPT

## 2024-12-07 PROCEDURE — 80053 COMPREHEN METABOLIC PANEL: CPT

## 2024-12-07 PROCEDURE — 85025 COMPLETE CBC W/AUTO DIFF WBC: CPT

## 2024-12-07 PROCEDURE — 83735 ASSAY OF MAGNESIUM: CPT

## 2024-12-07 PROCEDURE — 82306 VITAMIN D 25 HYDROXY: CPT

## 2024-12-07 PROCEDURE — 83036 HEMOGLOBIN GLYCOSYLATED A1C: CPT

## 2024-12-07 PROCEDURE — 84443 ASSAY THYROID STIM HORMONE: CPT

## 2024-12-07 PROCEDURE — 36415 COLL VENOUS BLD VENIPUNCTURE: CPT

## 2024-12-16 ENCOUNTER — OFFICE VISIT (OUTPATIENT)
Dept: FAMILY MEDICINE CLINIC | Age: 59
End: 2024-12-16
Payer: COMMERCIAL

## 2024-12-16 VITALS
TEMPERATURE: 97.1 F | OXYGEN SATURATION: 96 % | DIASTOLIC BLOOD PRESSURE: 66 MMHG | SYSTOLIC BLOOD PRESSURE: 90 MMHG | HEIGHT: 65 IN | WEIGHT: 212.2 LBS | HEART RATE: 63 BPM | BODY MASS INDEX: 35.35 KG/M2 | RESPIRATION RATE: 16 BRPM

## 2024-12-16 DIAGNOSIS — J30.2 SEASONAL ALLERGIES: ICD-10-CM

## 2024-12-16 DIAGNOSIS — F41.9 ANXIETY: ICD-10-CM

## 2024-12-16 DIAGNOSIS — I10 ESSENTIAL HYPERTENSION: ICD-10-CM

## 2024-12-16 DIAGNOSIS — Z23 FLU VACCINE NEED: Primary | ICD-10-CM

## 2024-12-16 PROCEDURE — 90661 CCIIV3 VAC ABX FR 0.5 ML IM: CPT | Performed by: NURSE PRACTITIONER

## 2024-12-16 PROCEDURE — 99214 OFFICE O/P EST MOD 30 MIN: CPT | Performed by: NURSE PRACTITIONER

## 2024-12-16 PROCEDURE — 3078F DIAST BP <80 MM HG: CPT | Performed by: NURSE PRACTITIONER

## 2024-12-16 PROCEDURE — 90471 IMMUNIZATION ADMIN: CPT | Performed by: NURSE PRACTITIONER

## 2024-12-16 PROCEDURE — 3074F SYST BP LT 130 MM HG: CPT | Performed by: NURSE PRACTITIONER

## 2024-12-16 RX ORDER — FLUTICASONE PROPIONATE 50 MCG
1 SPRAY, SUSPENSION (ML) NASAL DAILY
Qty: 32 G | Refills: 1 | Status: SHIPPED | OUTPATIENT
Start: 2024-12-16

## 2024-12-16 RX ORDER — LOSARTAN POTASSIUM 25 MG/1
25 TABLET ORAL DAILY
Qty: 90 TABLET | Refills: 1 | Status: SHIPPED | OUTPATIENT
Start: 2024-12-16

## 2024-12-16 RX ORDER — MONTELUKAST SODIUM 10 MG/1
10 TABLET ORAL DAILY
Qty: 30 TABLET | Refills: 3 | Status: SHIPPED | OUTPATIENT
Start: 2024-12-16

## 2024-12-16 RX ORDER — BUSPIRONE HYDROCHLORIDE 10 MG/1
10 TABLET ORAL 2 TIMES DAILY
Qty: 180 TABLET | Refills: 3 | Status: SHIPPED | OUTPATIENT
Start: 2024-12-16

## 2024-12-16 RX ORDER — PANTOPRAZOLE SODIUM 40 MG/1
40 TABLET, DELAYED RELEASE ORAL DAILY
Qty: 90 TABLET | Refills: 3 | Status: SHIPPED | OUTPATIENT
Start: 2024-12-16

## 2024-12-16 ASSESSMENT — ANXIETY QUESTIONNAIRES
6. BECOMING EASILY ANNOYED OR IRRITABLE: NOT AT ALL
5. BEING SO RESTLESS THAT IT IS HARD TO SIT STILL: NOT AT ALL
1. FEELING NERVOUS, ANXIOUS, OR ON EDGE: NOT AT ALL
7. FEELING AFRAID AS IF SOMETHING AWFUL MIGHT HAPPEN: NOT AT ALL
2. NOT BEING ABLE TO STOP OR CONTROL WORRYING: NOT AT ALL
3. WORRYING TOO MUCH ABOUT DIFFERENT THINGS: NOT AT ALL
GAD7 TOTAL SCORE: 0
4. TROUBLE RELAXING: NOT AT ALL

## 2024-12-16 ASSESSMENT — ENCOUNTER SYMPTOMS
SHORTNESS OF BREATH: 0
DIARRHEA: 0
COUGH: 0
EYE DISCHARGE: 0
RHINORRHEA: 1
VOMITING: 0
CONSTIPATION: 0
CHEST TIGHTNESS: 0
ABDOMINAL PAIN: 0

## 2024-12-16 NOTE — PROGRESS NOTES
After obtaining consent, and per orders of MEME Lan injection of Flucelvax 0.5ml given in Left deltoid by Tri Goldstein MA. Patient instructed to remain in clinic for 20 minutes afterwards, and to report any adverse reaction to me immediately.    Patient tolerated injection well. VIS given to patient.     Immunizations Administered       Name Date Dose Route    Influenza, FLUCELVAX, (age 6 mo+) IM, Trivalent PF, 0.5mL 12/16/2024 0.5 mL Intramuscular    Site: Deltoid- Left    Lot: [id number]    NDC: 69304-193-77            Vaccine Information Sheet, \"Influenza - Inactivated\"  given to Scarlett Byrd, or parent/legal guardian of  Scareltt Byrd and verbalized understanding.    Patient responses:    Have you ever had a reaction to a flu vaccine? No  Do you have an allergy to eggs, neomycin or polymixin?  No  Do you have an allergy to Thimerosal, contact lens solution, or Merthiolate? No  Have you ever had Guillian [place] Syndrome?  No  Do you have any current illness?  No  Do you have a temperature above 100 degrees? No  Are you pregnant? No  If pregnant, permission obtained from physician? No  Do you have an active neurological disorder? No      Flu vaccine given per order. Please see immunization tab.    
Health Maintenance Due   Topic Date Due    Hepatitis B vaccine (1 of 3 - 19+ 3-dose series) Never done    Shingles vaccine (1 of 2) Never done    Flu vaccine (1) 08/01/2024    COVID-19 Vaccine (3 - 2023-24 season) 09/01/2024       
Father     Heart Disease Brother     Heart Disease Brother     Diabetes Maternal Grandmother        Objective       BP 90/66 (Site: Left Upper Arm, Position: Sitting)   Pulse 63   Temp 97.1 °F (36.2 °C) (Temporal)   Resp 16   Ht 1.651 m (5' 5\")   Wt 96.3 kg (212 lb 3.2 oz)   LMP 02/05/2016   SpO2 96%   BMI 35.31 kg/m²   Physical Exam  Vitals reviewed.   Constitutional:       Appearance: She is well-developed.   HENT:      Head: Normocephalic and atraumatic.      Right Ear: External ear normal.      Left Ear: External ear normal.   Eyes:      Conjunctiva/sclera: Conjunctivae normal.   Neck:      Thyroid: No thyromegaly.      Trachea: Trachea normal.   Cardiovascular:      Rate and Rhythm: Normal rate and regular rhythm.      Heart sounds: Normal heart sounds. No murmur heard.     No friction rub. No gallop.   Pulmonary:      Effort: Pulmonary effort is normal.      Breath sounds: Normal breath sounds.   Abdominal:      General: Bowel sounds are normal.      Palpations: Abdomen is soft.      Tenderness: There is no abdominal tenderness.   Musculoskeletal:         General: Normal range of motion.      Cervical back: Normal range of motion and neck supple. No spinous process tenderness.   Skin:     General: Skin is warm and dry.      Findings: No erythema or rash.   Neurological:      Mental Status: She is alert and oriented to person, place, and time.   Psychiatric:         Speech: Speech normal.         Behavior: Behavior normal.         Thought Content: Thought content normal.           Data Reviewed and Summarized       Labs:     Imaging/Testing:        AMALIA ALEGRE, APRN - CNP

## 2024-12-16 NOTE — ASSESSMENT & PLAN NOTE
Blood pressure reviewed over the last couple months, will plan to decrease losartan-should monitor blood pressure at home and if consistently getting above 140/90 can increase back to 50 mg    Orders:    losartan (COZAAR) 25 MG tablet; Take 1 tablet by mouth daily    
Continue over-the-counter antihistamine and will add Singulair and Flonase as needed    Orders:    montelukast (SINGULAIR) 10 MG tablet; Take 1 tablet by mouth daily    fluticasone (FLONASE) 50 MCG/ACT nasal spray; 1 spray by Each Nostril route daily    
Controlled, continue BuSpar    Orders:    busPIRone (BUSPAR) 10 MG tablet; Take 1 tablet by mouth 2 times daily    
2

## 2025-02-20 DIAGNOSIS — F41.9 ANXIETY: ICD-10-CM

## 2025-02-20 RX ORDER — BUSPIRONE HYDROCHLORIDE 10 MG/1
10 TABLET ORAL 2 TIMES DAILY
Qty: 180 TABLET | Refills: 3 | Status: SHIPPED | OUTPATIENT
Start: 2025-02-20

## 2025-02-20 RX ORDER — BUSPIRONE HYDROCHLORIDE 10 MG/1
10 TABLET ORAL 2 TIMES DAILY
Qty: 60 TABLET | Refills: 0 | Status: SHIPPED | OUTPATIENT
Start: 2025-02-20 | End: 2025-03-22

## 2025-02-20 NOTE — TELEPHONE ENCOUNTER
Patient's last appointment was : 12/16/2024  Patient's next appointment is : 3/17/2025  Last refilled:    Buspar 10mg, 180 tablets, 3 refills, last refill 12/16/24    Patient would like a 30 day supply sent to the [facility] and then if the rest couple go to [facility], she notes she is completely out of the medication

## 2025-02-24 ENCOUNTER — HOSPITAL ENCOUNTER (OUTPATIENT)
Age: 60
Discharge: HOME | End: 2025-02-24
Payer: COMMERCIAL

## 2025-02-24 LAB — TSH SERPL DL<=0.005 MIU/L-ACNC: 0.59 UIU/ML (ref 0.4–4.2)

## 2025-02-24 PROCEDURE — 84443 ASSAY THYROID STIM HORMONE: CPT

## 2025-02-24 PROCEDURE — 86800 THYROGLOBULIN ANTIBODY: CPT

## 2025-02-24 PROCEDURE — 36415 COLL VENOUS BLD VENIPUNCTURE: CPT

## 2025-02-26 LAB — THYROGLOBULIN: NORMAL

## 2025-03-11 DIAGNOSIS — G89.29 CHRONIC LEFT-SIDED LOW BACK PAIN WITHOUT SCIATICA: ICD-10-CM

## 2025-03-11 DIAGNOSIS — M54.50 CHRONIC LEFT-SIDED LOW BACK PAIN WITHOUT SCIATICA: ICD-10-CM

## 2025-03-11 RX ORDER — BACLOFEN 10 MG/1
TABLET ORAL
Qty: 90 TABLET | Refills: 0 | Status: SHIPPED | OUTPATIENT
Start: 2025-03-11

## 2025-03-11 NOTE — TELEPHONE ENCOUNTER
Scarlett Byrd called requesting a refill on the following medications:  Requested Prescriptions     Pending Prescriptions Disp Refills    baclofen (LIORESAL) 10 MG tablet [Pharmacy Med Name: Baclofen 10 MG Oral Tablet] 90 tablet 0     Sig: TAKE 1 TABLET BY MOUTH ONCE DAILY AS NEEDED (MUSCLE  RELAXANT).       Date of last visit: 12/16/2024  Date of next visit (if applicable):3/17/2025  Date of last refill: 06/13/2024  Pharmacy Name: [facility],  Catina Jenkins MA

## 2025-03-17 ENCOUNTER — OFFICE VISIT (OUTPATIENT)
Dept: FAMILY MEDICINE CLINIC | Age: 60
End: 2025-03-17
Payer: COMMERCIAL

## 2025-03-17 VITALS
BODY MASS INDEX: 35.68 KG/M2 | HEART RATE: 64 BPM | OXYGEN SATURATION: 97 % | WEIGHT: 214.4 LBS | TEMPERATURE: 97.2 F | DIASTOLIC BLOOD PRESSURE: 80 MMHG | RESPIRATION RATE: 16 BRPM | SYSTOLIC BLOOD PRESSURE: 118 MMHG

## 2025-03-17 DIAGNOSIS — H61.22 IMPACTED CERUMEN OF LEFT EAR: ICD-10-CM

## 2025-03-17 DIAGNOSIS — H91.92 HEARING IMPAIRED PERSON, LEFT: Primary | ICD-10-CM

## 2025-03-17 DIAGNOSIS — I10 ESSENTIAL HYPERTENSION: ICD-10-CM

## 2025-03-17 PROCEDURE — 3079F DIAST BP 80-89 MM HG: CPT | Performed by: NURSE PRACTITIONER

## 2025-03-17 PROCEDURE — 3074F SYST BP LT 130 MM HG: CPT | Performed by: NURSE PRACTITIONER

## 2025-03-17 PROCEDURE — 99214 OFFICE O/P EST MOD 30 MIN: CPT | Performed by: NURSE PRACTITIONER

## 2025-03-17 RX ORDER — LOSARTAN POTASSIUM 25 MG/1
25 TABLET ORAL DAILY
Qty: 90 TABLET | Refills: 1 | Status: SHIPPED | OUTPATIENT
Start: 2025-03-17

## 2025-03-17 RX ORDER — LEVOTHYROXINE SODIUM 112 MCG
TABLET ORAL
COMMUNITY
Start: 2025-03-04

## 2025-03-17 RX ORDER — LIOTHYRONINE SODIUM 5 UG/1
TABLET ORAL
COMMUNITY
Start: 2025-03-04

## 2025-03-17 RX ORDER — METOPROLOL TARTRATE 25 MG/1
25 TABLET, FILM COATED ORAL 2 TIMES DAILY
Qty: 180 TABLET | Refills: 0 | Status: SHIPPED | OUTPATIENT
Start: 2025-03-17

## 2025-03-17 SDOH — ECONOMIC STABILITY: FOOD INSECURITY: WITHIN THE PAST 12 MONTHS, THE FOOD YOU BOUGHT JUST DIDN'T LAST AND YOU DIDN'T HAVE MONEY TO GET MORE.: NEVER TRUE

## 2025-03-17 SDOH — ECONOMIC STABILITY: FOOD INSECURITY: WITHIN THE PAST 12 MONTHS, YOU WORRIED THAT YOUR FOOD WOULD RUN OUT BEFORE YOU GOT MONEY TO BUY MORE.: NEVER TRUE

## 2025-03-17 ASSESSMENT — PATIENT HEALTH QUESTIONNAIRE - PHQ9
2. FEELING DOWN, DEPRESSED OR HOPELESS: NOT AT ALL
SUM OF ALL RESPONSES TO PHQ QUESTIONS 1-9: 0
SUM OF ALL RESPONSES TO PHQ QUESTIONS 1-9: 0
1. LITTLE INTEREST OR PLEASURE IN DOING THINGS: NOT AT ALL
SUM OF ALL RESPONSES TO PHQ QUESTIONS 1-9: 0
SUM OF ALL RESPONSES TO PHQ QUESTIONS 1-9: 0

## 2025-03-17 ASSESSMENT — ENCOUNTER SYMPTOMS
CHEST TIGHTNESS: 0
SHORTNESS OF BREATH: 0
CONSTIPATION: 0
ABDOMINAL PAIN: 0
RHINORRHEA: 0
EYE DISCHARGE: 0
DIARRHEA: 0
VOMITING: 0
COUGH: 0

## 2025-03-17 NOTE — PROGRESS NOTES
Health Maintenance Due   Topic Date Due    Hepatitis B vaccine (1 of 3 - 19+ 3-dose series) Never done    Shingles vaccine (1 of 2) Never done    Pneumococcal 50+ years Vaccine (1 of 1 - PCV) Never done    COVID-19 Vaccine (3 - 2024-25 season) 09/01/2024       
 Palpations: Abdomen is soft.      Tenderness: There is no abdominal tenderness.   Musculoskeletal:         General: Normal range of motion.      Cervical back: Normal range of motion and neck supple. No spinous process tenderness.   Skin:     General: Skin is warm and dry.      Findings: No erythema or rash.   Neurological:      Mental Status: She is alert and oriented to person, place, and time.   Psychiatric:         Speech: Speech normal.         Behavior: Behavior normal.         Thought Content: Thought content normal.                  The patient (or guardian, if applicable) and other individuals in attendance with the patient were advised that Artificial Intelligence will be utilized during this visit to record, process the conversation to generate a clinical note and to support improvement of the AI technology. The patient (or guardian, if applicable) and other individuals in attendance at the appointment consented to the use of AI, including the recording.      An electronic signature was used to authenticate this note.    --AMALIA ALEGRE, YOBANY - CNP

## 2025-04-10 ENCOUNTER — OFFICE VISIT (OUTPATIENT)
Dept: CARDIOLOGY CLINIC | Age: 60
End: 2025-04-10
Payer: COMMERCIAL

## 2025-04-10 VITALS
DIASTOLIC BLOOD PRESSURE: 78 MMHG | WEIGHT: 213.6 LBS | BODY MASS INDEX: 35.59 KG/M2 | SYSTOLIC BLOOD PRESSURE: 130 MMHG | HEIGHT: 65 IN | HEART RATE: 67 BPM

## 2025-04-10 DIAGNOSIS — R07.89 CHEST TIGHTNESS: Primary | ICD-10-CM

## 2025-04-10 DIAGNOSIS — R06.02 SOB (SHORTNESS OF BREATH) ON EXERTION: ICD-10-CM

## 2025-04-10 DIAGNOSIS — R94.39 ABNORMAL STRESS ECG: ICD-10-CM

## 2025-04-10 DIAGNOSIS — R00.2 INTERMITTENT PALPITATIONS: ICD-10-CM

## 2025-04-10 DIAGNOSIS — Z82.49 FAMILY HISTORY OF PREMATURE CAD: ICD-10-CM

## 2025-04-10 DIAGNOSIS — I10 ESSENTIAL HYPERTENSION: ICD-10-CM

## 2025-04-10 PROCEDURE — 3075F SYST BP GE 130 - 139MM HG: CPT | Performed by: INTERNAL MEDICINE

## 2025-04-10 PROCEDURE — 99214 OFFICE O/P EST MOD 30 MIN: CPT | Performed by: INTERNAL MEDICINE

## 2025-04-10 PROCEDURE — 3078F DIAST BP <80 MM HG: CPT | Performed by: INTERNAL MEDICINE

## 2025-04-10 PROCEDURE — 93000 ELECTROCARDIOGRAM COMPLETE: CPT | Performed by: INTERNAL MEDICINE

## 2025-04-10 NOTE — PROGRESS NOTES
Chief Complaint   Patient presents with    1 Year Follow Up   originally  patient  Here for abnormal EKG and cp        1 year follow up    EKG completed today    Had episodes of chest tightness yesterday and 2 days back  intermittent last few minutes,2 to 3 /10 and not exertion induced   Occasional chest aches once in a while    Occasional palpitations- stable and better    Sob on exertion -mainly on stairs -chronic unchanged     Denied   Dizziness, edema   No N/v/d    Hx of chest ache( no cp per pat) once in a while for yrs last for seconds, not exertion induced   And had stress test negative, less once in a month, mild one. Does not remember last time she had one.      FHX  Dad's first heart attck was when 57 years old.    One brother was 57 when he had heart attack and passed away and   her other brother was 55 when he had his heart attack and he is still living.  Nephew had CABG at age 46    Aunt in had cad in her 70's  Mother ashley      nevere smoked      Patient Active Problem List   Diagnosis    Anxiety    Fibroid, uterine    Intermittent palpitations    Family history of premature CAD-father and two brother had mi at age 57    Boderline Abnormal stress ECG-st depression of 0.6    Essential hypertension    Seasonal allergies    Osteopenia after menopause    Class 1 obesity due to excess calories with serious comorbidity and body mass index (BMI) of 33.0 to 33.9 in adult    Primary thyroid cancer (HCC)    Papillary thyroid carcinoma    Non-toxic multinodular goiter    SOB (shortness of breath) on exertion    Chronic right shoulder pain    Osteoarthritis of pelvis    Vitamin D deficiency    Hearing loss of left ear    Acquired spondylolisthesis    Degeneration of lumbar intervertebral disc    Lumbosacral spondylosis without myelopathy    Neurogenic claudication    Lumbar stenosis with neurogenic claudication    Chest tightness       Past Surgical History:   Procedure Laterality Date    HYSTERECTOMY, TOTAL

## 2025-04-11 DIAGNOSIS — J30.2 SEASONAL ALLERGIES: ICD-10-CM

## 2025-04-11 RX ORDER — MONTELUKAST SODIUM 10 MG/1
10 TABLET ORAL DAILY
Qty: 30 TABLET | Refills: 0 | Status: SHIPPED | OUTPATIENT
Start: 2025-04-11

## 2025-04-16 RX ORDER — PANTOPRAZOLE SODIUM 40 MG/1
40 TABLET, DELAYED RELEASE ORAL DAILY
Qty: 90 TABLET | Refills: 3 | Status: SHIPPED | OUTPATIENT
Start: 2025-04-16

## 2025-04-16 NOTE — TELEPHONE ENCOUNTER
Scarlett Byrd called requesting a refill on the following medications:  Requested Prescriptions     Pending Prescriptions Disp Refills    pantoprazole (PROTONIX) 40 MG tablet 90 tablet 3     Sig: Take 1 tablet by mouth daily       Date of last visit: 3/17/2025  Date of next visit (if applicable):6/16/2025  Date of last refill: 12/16/2024, was last sent to [facility] but patient would like it sent to mail order  Pharm[facility] Name: [facility]      Thanks,  Catina Jenkins MA

## 2025-04-28 ENCOUNTER — HOSPITAL ENCOUNTER (OUTPATIENT)
Dept: NUCLEAR MEDICINE | Age: 60
Discharge: HOME | End: 2025-04-28
Attending: INTERNAL MEDICINE
Payer: COMMERCIAL

## 2025-04-28 ENCOUNTER — HOSPITAL ENCOUNTER (OUTPATIENT)
Age: 60
Discharge: HOME | End: 2025-04-30
Attending: INTERNAL MEDICINE
Payer: COMMERCIAL

## 2025-04-28 VITALS
HEIGHT: 65 IN | BODY MASS INDEX: 35.49 KG/M2 | WEIGHT: 213 LBS | SYSTOLIC BLOOD PRESSURE: 130 MMHG | DIASTOLIC BLOOD PRESSURE: 78 MMHG

## 2025-04-28 DIAGNOSIS — R06.02 SOB (SHORTNESS OF BREATH) ON EXERTION: ICD-10-CM

## 2025-04-28 DIAGNOSIS — R94.39 ABNORMAL STRESS ECG: ICD-10-CM

## 2025-04-28 DIAGNOSIS — R07.89 CHEST TIGHTNESS: ICD-10-CM

## 2025-04-28 DIAGNOSIS — Z82.49 FAMILY HISTORY OF PREMATURE CAD: ICD-10-CM

## 2025-04-28 DIAGNOSIS — I10 ESSENTIAL HYPERTENSION: ICD-10-CM

## 2025-04-28 DIAGNOSIS — R00.2 INTERMITTENT PALPITATIONS: ICD-10-CM

## 2025-04-28 LAB
ECHO AO ASC DIAM: 2.9 CM
ECHO AO ASCENDING AORTA INDEX: 1.43 CM/M2
ECHO AV CUSP MM: 1.4 CM
ECHO AV PEAK GRADIENT: 8 MMHG
ECHO AV PEAK VELOCITY: 1.4 M/S
ECHO AV VELOCITY RATIO: 0.71
ECHO BSA: 2.1 M2
ECHO BSA: 2.1 M2
ECHO EST RA PRESSURE: 5 MMHG
ECHO LA AREA 2C: 19 CM2
ECHO LA AREA 4C: 18.7 CM2
ECHO LA DIAMETER INDEX: 1.58 CM/M2
ECHO LA DIAMETER: 3.2 CM
ECHO LA MAJOR AXIS: 5.4 CM
ECHO LA MINOR AXIS: 5.9 CM
ECHO LA VOL BP: 52 ML (ref 22–52)
ECHO LA VOL MOD A2C: 49 ML (ref 22–52)
ECHO LA VOL MOD A4C: 52 ML (ref 22–52)
ECHO LA VOL/BSA BIPLANE: 26 ML/M2 (ref 16–34)
ECHO LA VOLUME INDEX MOD A2C: 24 ML/M2 (ref 16–34)
ECHO LA VOLUME INDEX MOD A4C: 26 ML/M2 (ref 16–34)
ECHO LV E' LATERAL VELOCITY: 11.9 CM/S
ECHO LV E' SEPTAL VELOCITY: 8.8 CM/S
ECHO LV EF PHYSICIAN: 60 %
ECHO LV FRACTIONAL SHORTENING: 25 % (ref 28–44)
ECHO LV INTERNAL DIMENSION DIASTOLE INDEX: 2.76 CM/M2
ECHO LV INTERNAL DIMENSION DIASTOLIC: 5.6 CM (ref 3.9–5.3)
ECHO LV INTERNAL DIMENSION SYSTOLIC INDEX: 2.07 CM/M2
ECHO LV INTERNAL DIMENSION SYSTOLIC: 4.2 CM
ECHO LV ISOVOLUMETRIC RELAXATION TIME (IVRT): 113 MS
ECHO LV IVSD: 0.9 CM (ref 0.6–0.9)
ECHO LV MASS 2D: 178.1 G (ref 67–162)
ECHO LV MASS INDEX 2D: 87.8 G/M2 (ref 43–95)
ECHO LV POSTERIOR WALL DIASTOLIC: 0.8 CM (ref 0.6–0.9)
ECHO LV RELATIVE WALL THICKNESS RATIO: 0.29
ECHO LVOT PEAK GRADIENT: 4 MMHG
ECHO LVOT PEAK VELOCITY: 1 M/S
ECHO MV A VELOCITY: 0.67 M/S
ECHO MV E DECELERATION TIME (DT): 165 MS
ECHO MV E VELOCITY: 0.9 M/S
ECHO MV E/A RATIO: 1.34
ECHO MV E/E' LATERAL: 7.56
ECHO MV E/E' RATIO (AVERAGED): 8.9
ECHO MV E/E' SEPTAL: 10.23
ECHO MV REGURGITANT PEAK GRADIENT: 46 MMHG
ECHO MV REGURGITANT PEAK VELOCITY: 3.4 M/S
ECHO PV MAX VELOCITY: 0.9 M/S
ECHO PV PEAK GRADIENT: 3 MMHG
ECHO RIGHT VENTRICULAR SYSTOLIC PRESSURE (RVSP): 36 MMHG
ECHO RV INTERNAL DIMENSION: 2.5 CM
ECHO RV TAPSE: 2.1 CM (ref 1.7–?)
ECHO TV E WAVE: 0.7 M/S
ECHO TV REGURGITANT MAX VELOCITY: 2.8 M/S
ECHO TV REGURGITANT PEAK GRADIENT: 31 MMHG
NUC STRESS EJECTION FRACTION: 58 %
STRESS BASELINE DIAS BP: 78 MMHG
STRESS BASELINE HR: 64 BPM
STRESS BASELINE ST DEPRESSION: 0 MM
STRESS BASELINE SYS BP: 137 MMHG
STRESS ESTIMATED WORKLOAD: 1 METS
STRESS PEAK DIAS BP: 81 MMHG
STRESS PEAK SYS BP: 148 MMHG
STRESS PERCENT HR ACHIEVED: 70 %
STRESS POST PEAK HR: 112 BPM
STRESS RATE PRESSURE PRODUCT: NORMAL BPM*MMHG
STRESS ST DEPRESSION: 0 MM
STRESS STAGE 1 BP: NORMAL MMHG
STRESS STAGE 1 DURATION: 1 MIN:SEC
STRESS STAGE 1 HR: 112 BPM
STRESS STAGE 2 BP: NORMAL MMHG
STRESS STAGE 2 DURATION: 1 MIN:SEC
STRESS STAGE 2 HR: 106 BPM
STRESS STAGE 3 BP: NORMAL MMHG
STRESS STAGE 3 DURATION: 1 MIN:SEC
STRESS STAGE 3 HR: 88 BPM
STRESS STAGE RECOVERY 1 BP: NORMAL MMHG
STRESS STAGE RECOVERY 1 DURATION: 1 MIN:SEC
STRESS STAGE RECOVERY 1 HR: 87 BPM
STRESS STAGE RECOVERY 2 BP: NORMAL MMHG
STRESS STAGE RECOVERY 2 DURATION: 1 MIN:SEC
STRESS STAGE RECOVERY 2 HR: 78 BPM
STRESS STAGE RECOVERY 3 BP: NORMAL MMHG
STRESS STAGE RECOVERY 3 DURATION: 1 MIN:SEC
STRESS STAGE RECOVERY 3 HR: 78 BPM
STRESS STAGE RECOVERY 4 BP: NORMAL MMHG
STRESS STAGE RECOVERY 4 DURATION: 2 MIN:SEC
STRESS STAGE RECOVERY 4 HR: 73 BPM
STRESS TARGET HR: 161 BPM
TID: 0.95

## 2025-04-28 PROCEDURE — 78452 HT MUSCLE IMAGE SPECT MULT: CPT | Performed by: INTERNAL MEDICINE

## 2025-04-28 PROCEDURE — 93017 CV STRESS TEST TRACING ONLY: CPT

## 2025-04-28 PROCEDURE — 78452 HT MUSCLE IMAGE SPECT MULT: CPT

## 2025-04-28 PROCEDURE — 93016 CV STRESS TEST SUPVJ ONLY: CPT | Performed by: INTERNAL MEDICINE

## 2025-04-28 PROCEDURE — 93018 CV STRESS TEST I&R ONLY: CPT | Performed by: INTERNAL MEDICINE

## 2025-04-28 PROCEDURE — 93306 TTE W/DOPPLER COMPLETE: CPT

## 2025-04-28 PROCEDURE — 6360000002 HC RX W HCPCS: Performed by: INTERNAL MEDICINE

## 2025-04-28 PROCEDURE — A9500 TC99M SESTAMIBI: HCPCS | Performed by: INTERNAL MEDICINE

## 2025-04-28 PROCEDURE — 3430000000 HC RX DIAGNOSTIC RADIOPHARMACEUTICAL: Performed by: INTERNAL MEDICINE

## 2025-04-28 PROCEDURE — 93306 TTE W/DOPPLER COMPLETE: CPT | Performed by: INTERNAL MEDICINE

## 2025-04-28 RX ORDER — TETRAKIS(2-METHOXYISOBUTYLISOCYANIDE)COPPER(I) TETRAFLUOROBORATE 1 MG/ML
35 INJECTION, POWDER, LYOPHILIZED, FOR SOLUTION INTRAVENOUS
Status: COMPLETED | OUTPATIENT
Start: 2025-04-28 | End: 2025-04-28

## 2025-04-28 RX ORDER — TETRAKIS(2-METHOXYISOBUTYLISOCYANIDE)COPPER(I) TETRAFLUOROBORATE 1 MG/ML
11 INJECTION, POWDER, LYOPHILIZED, FOR SOLUTION INTRAVENOUS
Status: COMPLETED | OUTPATIENT
Start: 2025-04-28 | End: 2025-04-28

## 2025-04-28 RX ORDER — REGADENOSON 0.08 MG/ML
0.4 INJECTION, SOLUTION INTRAVENOUS
Status: COMPLETED | OUTPATIENT
Start: 2025-04-28 | End: 2025-04-28

## 2025-04-28 RX ADMIN — Medication 11 MILLICURIE: at 11:40

## 2025-04-28 RX ADMIN — REGADENOSON 0.4 MG: 0.08 INJECTION, SOLUTION INTRAVENOUS at 12:51

## 2025-04-28 RX ADMIN — Medication 35 MILLICURIE: at 12:53

## 2025-05-06 ENCOUNTER — HOSPITAL ENCOUNTER (OUTPATIENT)
Age: 60
Discharge: HOME | End: 2025-05-06
Payer: COMMERCIAL

## 2025-05-06 LAB — TSH SERPL DL<=0.05 MIU/L-ACNC: 0.13 UIU/ML (ref 0.27–4.2)

## 2025-05-06 PROCEDURE — 84443 ASSAY THYROID STIM HORMONE: CPT

## 2025-05-06 PROCEDURE — 86800 THYROGLOBULIN ANTIBODY: CPT

## 2025-05-06 PROCEDURE — 36415 COLL VENOUS BLD VENIPUNCTURE: CPT

## 2025-05-07 LAB — THYROGLOBULIN: NORMAL

## 2025-05-10 DIAGNOSIS — J30.2 SEASONAL ALLERGIES: ICD-10-CM

## 2025-05-12 DIAGNOSIS — J30.2 SEASONAL ALLERGIES: ICD-10-CM

## 2025-05-12 RX ORDER — MONTELUKAST SODIUM 10 MG/1
10 TABLET ORAL DAILY
Qty: 30 TABLET | Refills: 0 | OUTPATIENT
Start: 2025-05-12

## 2025-05-12 RX ORDER — MONTELUKAST SODIUM 10 MG/1
10 TABLET ORAL DAILY
Qty: 30 TABLET | Refills: 0 | Status: SHIPPED | OUTPATIENT
Start: 2025-05-12

## 2025-05-16 RX ORDER — METOPROLOL TARTRATE 25 MG/1
25 TABLET, FILM COATED ORAL 2 TIMES DAILY
Qty: 180 TABLET | Refills: 1 | Status: SHIPPED | OUTPATIENT
Start: 2025-05-16

## 2025-05-28 DIAGNOSIS — L20.84 INTRINSIC ECZEMA: ICD-10-CM

## 2025-05-28 RX ORDER — TRIAMCINOLONE ACETONIDE 1 MG/G
OINTMENT TOPICAL 2 TIMES DAILY PRN
Qty: 30 G | Refills: 1 | Status: SHIPPED | OUTPATIENT
Start: 2025-05-28

## 2025-06-04 ENCOUNTER — TRANSCRIBE ORDERS (OUTPATIENT)
Dept: ADMINISTRATIVE | Age: 60
End: 2025-06-04

## 2025-06-04 DIAGNOSIS — Z78.0 ASYMPTOMATIC MENOPAUSAL STATE: Primary | ICD-10-CM

## 2025-06-04 DIAGNOSIS — Z12.31 ENCOUNTER FOR SCREENING MAMMOGRAM FOR MALIGNANT NEOPLASM OF BREAST: Primary | ICD-10-CM

## 2025-06-12 NOTE — PROGRESS NOTES
Health Maintenance Due   Topic Date Due    Hepatitis B vaccine (1 of 3 - 19+ 3-dose series) Never done    Shingles vaccine (1 of 2) Never done    Pneumococcal 50+ years Vaccine (1 of 1 - PCV) Never done    COVID-19 Vaccine (3 - 2024-25 season) 09/01/2024

## 2025-06-15 DIAGNOSIS — J30.2 SEASONAL ALLERGIES: ICD-10-CM

## 2025-06-16 ENCOUNTER — OFFICE VISIT (OUTPATIENT)
Dept: FAMILY MEDICINE CLINIC | Age: 60
End: 2025-06-16
Payer: COMMERCIAL

## 2025-06-16 VITALS
HEIGHT: 65 IN | OXYGEN SATURATION: 96 % | HEART RATE: 57 BPM | BODY MASS INDEX: 35.45 KG/M2 | DIASTOLIC BLOOD PRESSURE: 82 MMHG | SYSTOLIC BLOOD PRESSURE: 122 MMHG | WEIGHT: 212.8 LBS

## 2025-06-16 DIAGNOSIS — H61.23 BILATERAL IMPACTED CERUMEN: ICD-10-CM

## 2025-06-16 DIAGNOSIS — Z00.8 ENCOUNTER FOR BIOMETRIC SCREENING: ICD-10-CM

## 2025-06-16 DIAGNOSIS — Z00.00 ANNUAL PHYSICAL EXAM: Primary | ICD-10-CM

## 2025-06-16 DIAGNOSIS — Z23 NEED FOR PROPHYLACTIC VACCINATION AGAINST STREPTOCOCCUS PNEUMONIAE (PNEUMOCOCCUS): ICD-10-CM

## 2025-06-16 DIAGNOSIS — C73 PAPILLARY THYROID CARCINOMA (HCC): ICD-10-CM

## 2025-06-16 PROCEDURE — 3074F SYST BP LT 130 MM HG: CPT | Performed by: NURSE PRACTITIONER

## 2025-06-16 PROCEDURE — 99396 PREV VISIT EST AGE 40-64: CPT | Performed by: NURSE PRACTITIONER

## 2025-06-16 PROCEDURE — 3079F DIAST BP 80-89 MM HG: CPT | Performed by: NURSE PRACTITIONER

## 2025-06-16 PROCEDURE — 69209 REMOVE IMPACTED EAR WAX UNI: CPT | Performed by: NURSE PRACTITIONER

## 2025-06-16 RX ORDER — MONTELUKAST SODIUM 10 MG/1
10 TABLET ORAL DAILY
Qty: 30 TABLET | Refills: 0 | Status: SHIPPED | OUTPATIENT
Start: 2025-06-16

## 2025-06-16 ASSESSMENT — ENCOUNTER SYMPTOMS
RHINORRHEA: 0
COUGH: 0
EYE DISCHARGE: 0
VOMITING: 0
ABDOMINAL PAIN: 0
SHORTNESS OF BREATH: 0
DIARRHEA: 0
CONSTIPATION: 0
CHEST TIGHTNESS: 0

## 2025-06-16 NOTE — PROGRESS NOTES
Scarlett Byrd (:  1965) is a 60 y.o. female, Established patient, here for evaluation of the following chief complaint(s):  Annual Exam (Physical. /No concerns voiced at this time.)         Assessment & Plan  1. Health maintenance.  - Last mammogram conducted in 2024; colonoscopy in 2017.  - Due for a pneumonia vaccine ; deferring COVID-19 vaccination.  - Wellness labs from 2024 within normal limits; thyroid labs managed by endocrinologist.  - Advised to engage in 150 minutes of moderate to intense exercise per week; repeat mammogram and DEXA scan scheduled for 2025; cardiology appointment in 2026. Orders placed for CBC, comprehensive metabolic panel, hemoglobin A1c, and lipid panel for 2025.    2. Cerumen impaction.  - Reports issues with ear wax buildup affecting hearing aid functionality.  - Examination revealed mild cerumen impaction in both ears.  - Ear irrigation to be performed today; advised to use Debrox eardrops to prevent future buildup.    Follow-up  The patient will follow up in 2025.    Results  Labs   - Wellness labs: 2024, Normal  1. Annual physical exam  2. Need for prophylactic vaccination against Streptococcus pneumoniae (pneumococcus)  -     Pneumococcal, PCV20, PREVNAR 20, (age 6w+), IM, PF  3. Papillary thyroid carcinoma (HCC)  4. Encounter for biometric screening  -     CBC with Auto Differential; Future  -     Comprehensive Metabolic Panel, Fasting; Future  -     Hemoglobin A1C; Future  -     Lipid Panel; Future  -     Magnesium; Future  5. Bilateral impacted cerumen  -     REMOVAL IMPACTED CERUMEN IRRIGATION/LVG UNILAT    Return in about 6 months (around 2025) for chronic issues- get labs prior .       Subjective   History of Present Illness  The patient presents for a physical exam.    She maintains an active lifestyle but does not follow any specific exercise regimen. She reports regular dental and eye care visits. Her diet is not particularly

## 2025-06-16 NOTE — TELEPHONE ENCOUNTER
Patient's last appointment was : 3/17/2025  Patient's next appointment is : 6/16/2025  Last refilled:      Montelukast 10mg, 30 tabs, 0 refills, last filled 5/12/25

## 2025-06-30 ENCOUNTER — HOSPITAL ENCOUNTER (OUTPATIENT)
Dept: WOMENS IMAGING | Age: 60
Discharge: HOME | End: 2025-06-30
Payer: COMMERCIAL

## 2025-06-30 VITALS — BODY MASS INDEX: 35.28 KG/M2 | WEIGHT: 212 LBS

## 2025-06-30 DIAGNOSIS — Z12.31 ENCOUNTER FOR SCREENING MAMMOGRAM FOR MALIGNANT NEOPLASM OF BREAST: ICD-10-CM

## 2025-06-30 DIAGNOSIS — Z78.0 ASYMPTOMATIC MENOPAUSAL STATE: ICD-10-CM

## 2025-06-30 PROCEDURE — 77080 DXA BONE DENSITY AXIAL: CPT

## 2025-06-30 PROCEDURE — 77063 BREAST TOMOSYNTHESIS BI: CPT

## 2025-07-07 ENCOUNTER — OFFICE VISIT (OUTPATIENT)
Dept: FAMILY MEDICINE CLINIC | Age: 60
End: 2025-07-07

## 2025-07-07 ENCOUNTER — TELEPHONE (OUTPATIENT)
Dept: FAMILY MEDICINE CLINIC | Age: 60
End: 2025-07-07

## 2025-07-07 VITALS
OXYGEN SATURATION: 99 % | HEART RATE: 70 BPM | SYSTOLIC BLOOD PRESSURE: 106 MMHG | DIASTOLIC BLOOD PRESSURE: 62 MMHG | HEIGHT: 65 IN | WEIGHT: 214.3 LBS | BODY MASS INDEX: 35.7 KG/M2

## 2025-07-07 DIAGNOSIS — L30.8 OTHER ECZEMA: Primary | ICD-10-CM

## 2025-07-07 DIAGNOSIS — L23.7 POISON IVY DERMATITIS: ICD-10-CM

## 2025-07-07 RX ORDER — ASPIRIN 81 MG
2 TABLET, DELAYED RELEASE (ENTERIC COATED) ORAL
COMMUNITY

## 2025-07-07 RX ORDER — NYSTATIN 100000 [USP'U]/G
POWDER TOPICAL
COMMUNITY
Start: 2025-06-04

## 2025-07-07 RX ORDER — HYDROXYZINE HYDROCHLORIDE 25 MG/1
25 TABLET, FILM COATED ORAL 4 TIMES DAILY PRN
Qty: 40 TABLET | Refills: 0 | Status: SHIPPED | OUTPATIENT
Start: 2025-07-07 | End: 2025-07-17

## 2025-07-07 RX ORDER — ALPRAZOLAM 0.5 MG
TABLET ORAL
COMMUNITY
End: 2025-07-07

## 2025-07-07 RX ORDER — METHYLPREDNISOLONE ACETATE 80 MG/ML
80 INJECTION, SUSPENSION INTRA-ARTICULAR; INTRALESIONAL; INTRAMUSCULAR; SOFT TISSUE ONCE
Status: COMPLETED | OUTPATIENT
Start: 2025-07-07 | End: 2025-07-07

## 2025-07-07 RX ORDER — LEVOTHYROXINE SODIUM 150 UG/1
TABLET ORAL
COMMUNITY
Start: 2025-07-03

## 2025-07-07 RX ADMIN — METHYLPREDNISOLONE ACETATE 80 MG: 80 INJECTION, SUSPENSION INTRA-ARTICULAR; INTRALESIONAL; INTRAMUSCULAR; SOFT TISSUE at 10:39

## 2025-07-07 ASSESSMENT — ENCOUNTER SYMPTOMS
CHEST TIGHTNESS: 0
SHORTNESS OF BREATH: 0
RHINORRHEA: 0
COUGH: 0
VOMITING: 0
NAUSEA: 0
EYE ITCHING: 0

## 2025-07-07 NOTE — TELEPHONE ENCOUNTER
Patient requesting a shot for her itchy eczema- wanting to know if she can just do a nurse visit for a shot or if she needs an appt to see PCP first.

## 2025-07-07 NOTE — PROGRESS NOTES
After obtaining consent, and per orders of MEME Lan injection of Depo-Medrol 80mg given in Dorsogluteal Right  by Tri Goldstein MA. Patient instructed to remain in clinic for 20 minutes afterwards, and to report any adverse reaction to me immediately.    Patient tolerated injection well.     Administrations This Visit       methylPREDNISolone acetate (DEPO-MEDROL) injection 80 mg       Admin Date  07/07/2025  10:39 Action  Given Dose  80 mg Route  IntraMUSCular Site  Dorsogluteal Right Documented By  Tri Goldstein MA    NDC: 5735-3310-44    Lot#: [id number]    : [employer] [place]    Patient Supplied?: No

## 2025-07-07 NOTE — PROGRESS NOTES
Scarlett Byrd (:  1965) is a 60 y.o. female, Established patient, here for evaluation of the following chief complaint(s):  Other (Eczema irritation on bilateral arms, chest/Would like a shot)         Assessment & Plan  1. Eczema.  - The patient's eczema has worsened, however presenting with blistering rashes resembling poison ivy.  - Currently on Singulair and Flonase; Kenalog ointment is no longer effective.  - Advised to use eczema cream after bathing and to shower every other day or third day, focusing on cleansing private areas with soap, wash, or wipes.  - Steroid injection of Depo-Medrol will be administered today; prescription for Atarax (hydroxyzine) provided to manage itching, with caution about potential drowsiness. If the condition persists by Tuesday or Wednesday, contact the office for an oral steroid prescription.    Results    1. Other eczema  2. Poison ivy dermatitis  -     methylPREDNISolone acetate (DEPO-MEDROL) injection 80 mg; 80 mg, IntraMUSCular, ONCE, 1 dose, On 25 at 1100  -     hydrOXYzine HCl (ATARAX) 25 MG tablet; Take 1 tablet by mouth 4 times daily as needed for Itching, Disp-40 tablet, R-0Normal    Return if symptoms worsen or fail to improve.       Subjective   History of Present Illness  The patient presents for evaluation of eczema.    She reports a worsening of her eczema, which has spread to both arms and her chest. The condition began on her left side and has since been spreading. She expresses concern about an upcoming  day, fearing that the heat may exacerbate her symptoms. She is not experiencing any shortness of breath or wheezing. She has been using Benadryl, an over-the-counter antihistamine, which has caused drowsiness. Additionally, she has been applying Kenalog ointment as needed, but it seems to be ineffective at present.    Review of Systems   Constitutional:  Negative for activity change, appetite change and fever.   HENT:  Negative for

## 2025-07-16 DIAGNOSIS — J30.2 SEASONAL ALLERGIES: ICD-10-CM

## 2025-07-16 RX ORDER — MONTELUKAST SODIUM 10 MG/1
10 TABLET ORAL DAILY
Qty: 30 TABLET | Refills: 0 | Status: SHIPPED | OUTPATIENT
Start: 2025-07-16

## 2025-07-16 NOTE — TELEPHONE ENCOUNTER
Patient's last appointment was : 7/7/2025  Patient's next appointment is : 12/16/2025  Last refilled:    Montelukast 10mg, 30 tabs, 0 refills, last refill 6/16/25

## 2025-07-18 ENCOUNTER — HOSPITAL ENCOUNTER (OUTPATIENT)
Age: 60
Discharge: HOME | End: 2025-07-18
Payer: COMMERCIAL

## 2025-07-18 LAB — TSH SERPL DL<=0.05 MIU/L-ACNC: 1.03 UIU/ML (ref 0.27–4.2)

## 2025-07-18 PROCEDURE — 84443 ASSAY THYROID STIM HORMONE: CPT

## 2025-07-18 PROCEDURE — 36415 COLL VENOUS BLD VENIPUNCTURE: CPT

## 2025-07-31 ENCOUNTER — OFFICE VISIT (OUTPATIENT)
Dept: FAMILY MEDICINE CLINIC | Age: 60
End: 2025-07-31
Payer: COMMERCIAL

## 2025-07-31 VITALS
HEIGHT: 65 IN | DIASTOLIC BLOOD PRESSURE: 86 MMHG | OXYGEN SATURATION: 96 % | BODY MASS INDEX: 36.46 KG/M2 | RESPIRATION RATE: 18 BRPM | WEIGHT: 218.8 LBS | TEMPERATURE: 98.3 F | HEART RATE: 61 BPM | SYSTOLIC BLOOD PRESSURE: 136 MMHG

## 2025-07-31 DIAGNOSIS — L23.7 POISON IVY DERMATITIS: Primary | ICD-10-CM

## 2025-07-31 DIAGNOSIS — S20.211A CONTUSION OF RIB ON RIGHT SIDE, INITIAL ENCOUNTER: ICD-10-CM

## 2025-07-31 PROCEDURE — 99214 OFFICE O/P EST MOD 30 MIN: CPT | Performed by: NURSE PRACTITIONER

## 2025-07-31 PROCEDURE — 3079F DIAST BP 80-89 MM HG: CPT | Performed by: NURSE PRACTITIONER

## 2025-07-31 PROCEDURE — 3075F SYST BP GE 130 - 139MM HG: CPT | Performed by: NURSE PRACTITIONER

## 2025-07-31 RX ORDER — PREDNISONE 10 MG/1
10 TABLET ORAL DAILY
Qty: 21 TABLET | Refills: 0 | Status: SHIPPED | OUTPATIENT
Start: 2025-07-31 | End: 2025-08-10

## 2025-07-31 ASSESSMENT — ENCOUNTER SYMPTOMS
EYE DISCHARGE: 0
COUGH: 0
DIARRHEA: 0
CHEST TIGHTNESS: 0
RHINORRHEA: 0
SHORTNESS OF BREATH: 0
CONSTIPATION: 0
VOMITING: 0
ABDOMINAL PAIN: 0

## 2025-07-31 NOTE — PROGRESS NOTES
Health Maintenance Due   Topic Date Due    Hepatitis B vaccine (1 of 3 - 19+ 3-dose series) Never done    Shingles vaccine (1 of 2) Never done    Pneumococcal 50+ years Vaccine (1 of 1 - PCV) Never done       
electronic signature was used to authenticate this note.    --AMALIA ALEGRE, APRN - CNP

## 2025-08-12 DIAGNOSIS — J30.2 SEASONAL ALLERGIES: ICD-10-CM

## 2025-08-12 RX ORDER — MONTELUKAST SODIUM 10 MG/1
10 TABLET ORAL DAILY
Qty: 30 TABLET | Refills: 0 | Status: SHIPPED | OUTPATIENT
Start: 2025-08-12

## (undated) DEVICE — Device

## (undated) DEVICE — SUTURE VCRL SZ 1 L18IN ABSRB VLT CTX L48MM 1/2 CIR J765D

## (undated) DEVICE — HEAD POSITIONER, SURGICAL: Brand: DEROYAL

## (undated) DEVICE — JCKSON TBL POSTNER NO HD REST: Brand: MEDLINE INDUSTRIES, INC.

## (undated) DEVICE — CELL SAVER TUBING

## (undated) DEVICE — PAD,NON-ADHERENT,3X8,STERILE,LF,1/PK: Brand: MEDLINE

## (undated) DEVICE — PACK-MAJOR

## (undated) DEVICE — 4.0MM PRECISION ROUND

## (undated) DEVICE — GOWN,SIRUS,NON REINFRCD,LARGE,SET IN SL: Brand: MEDLINE

## (undated) DEVICE — PROBE STIM 3 MM FOR PEDCL SCREW DISP

## (undated) DEVICE — TOTAL TRAY, DB, 100% SILI FOLEY, 16FR 10: Brand: MEDLINE

## (undated) DEVICE — GLOVE SURG SZ 85 L12IN FNGR ORTHO 126MIL CRM LTX FREE

## (undated) DEVICE — DRESSING TRNSPAR W8XL12IN FLM SURESITE 123

## (undated) DEVICE — KIT EVAC 400CC PVC RADPQ Y CONN

## (undated) DEVICE — CELL SAVER PACK

## (undated) DEVICE — BIPOLAR SEALER 23-112-1 AQM 6.0: Brand: AQUAMANTYS ®

## (undated) DEVICE — AGENT HEMOSTATIC SURGIFLOW MATRIX KIT W/THROMBIN

## (undated) DEVICE — PENCIL SMK EVAC ALL IN 1 DSGN ENH VISIBILITY IMPROVED AIR

## (undated) DEVICE — DRAIN SURG 10FR PVC TB W/ TRCR MID PERF NO RESVR HUBLESS

## (undated) DEVICE — BASIC SINGLE BASIN BTC-LF: Brand: MEDLINE INDUSTRIES, INC.

## (undated) DEVICE — THE MILL DISPOSABLE - MEDIUM

## (undated) DEVICE — BLADE ES L4IN INSUL EDGE